# Patient Record
Sex: MALE | Race: WHITE | NOT HISPANIC OR LATINO | Employment: OTHER | URBAN - METROPOLITAN AREA
[De-identification: names, ages, dates, MRNs, and addresses within clinical notes are randomized per-mention and may not be internally consistent; named-entity substitution may affect disease eponyms.]

---

## 2017-01-03 ENCOUNTER — OFFICE VISIT (OUTPATIENT)
Dept: UROLOGY | Facility: CLINIC | Age: 67
End: 2017-01-03
Payer: MEDICARE

## 2017-01-03 VITALS — BODY MASS INDEX: 28.71 KG/M2 | HEIGHT: 72 IN | WEIGHT: 212 LBS

## 2017-01-03 DIAGNOSIS — N20.0 NEPHROLITHIASIS: Primary | ICD-10-CM

## 2017-01-03 LAB
BACTERIA #/AREA URNS AUTO: ABNORMAL /HPF
BILIRUB UR QL STRIP: NEGATIVE
CLARITY UR REFRACT.AUTO: CLEAR
COLOR UR AUTO: YELLOW
GLUCOSE UR QL STRIP: ABNORMAL
HGB UR QL STRIP: ABNORMAL
HYALINE CASTS UR QL AUTO: 3 /LPF
KETONES UR QL STRIP: NEGATIVE
LEUKOCYTE ESTERASE UR QL STRIP: NEGATIVE
MICROSCOPIC COMMENT: ABNORMAL
NITRITE UR QL STRIP: NEGATIVE
PH UR STRIP: 6 [PH] (ref 5–8)
PROT UR QL STRIP: ABNORMAL
RBC #/AREA URNS AUTO: 11 /HPF (ref 0–4)
SP GR UR STRIP: 1.02 (ref 1–1.03)
URN SPEC COLLECT METH UR: ABNORMAL
UROBILINOGEN UR STRIP-ACNC: NEGATIVE EU/DL
WBC #/AREA URNS AUTO: 3 /HPF (ref 0–5)
YEAST UR QL AUTO: ABNORMAL

## 2017-01-03 PROCEDURE — 99999 PR PBB SHADOW E&M-EST. PATIENT-LVL III: CPT | Mod: PBBFAC,,, | Performed by: UROLOGY

## 2017-01-03 PROCEDURE — 87086 URINE CULTURE/COLONY COUNT: CPT

## 2017-01-03 PROCEDURE — 81001 URINALYSIS AUTO W/SCOPE: CPT

## 2017-01-03 PROCEDURE — 1159F MED LIST DOCD IN RCRD: CPT | Mod: S$GLB,,, | Performed by: UROLOGY

## 2017-01-03 PROCEDURE — 81002 URINALYSIS NONAUTO W/O SCOPE: CPT | Mod: S$GLB,,, | Performed by: UROLOGY

## 2017-01-03 PROCEDURE — 99204 OFFICE O/P NEW MOD 45 MIN: CPT | Mod: 25,S$GLB,, | Performed by: UROLOGY

## 2017-01-04 LAB — BACTERIA UR CULT: NO GROWTH

## 2017-01-04 NOTE — PROGRESS NOTES
Subjective:      Patient ID: Sherif Knutson Jr. is a 66 y.o. male.    Chief Complaint: Nephrolithiasis    Patient is a 66 y.o. male who is new to our clinic and self-referred for evaluation of nephrolithiasis.     HPI  The patient comes with outside imaging confirming bilateral nephrolithiasis.  He reports intermittent left sided abdominal and flank pain. No fevers or chills.  He did have hematuria which prompted a work-up, and this is how he came to have the CT urogram.  He reports having had a cystoscopy which was by his report normal.  He has a long history of kidney stones.  No dysuria.  No fevers or chills.  No nausea/vomiting.       CT scan was independently reviewed today and reveals non-obstructing right renal stones without hydronephrosis.  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  No left ureteral stones.      Review of Systems   All other systems reviewed and negative except pertinent positives noted in HPI.    Objective:     Physical Exam   Constitutional: He is oriented to person, place, and time. He appears well-developed and well-nourished. No distress.   HENT:   Head: Normocephalic and atraumatic.   Eyes: No scleral icterus.   Neck: No tracheal deviation present.   Pulmonary/Chest: Effort normal. No respiratory distress.   Abdominal:       Neurological: He is alert and oriented to person, place, and time.   Psychiatric: He has a normal mood and affect. His behavior is normal. Judgment and thought content normal.     Assessment:     1. Nephrolithiasis      Plan:     1. Kidney stones:  -General risk factors for kidney stones and the conservative measures to prevent kidney stones in the future were discussed with the patient in detail.  The patient was encouraged to drink 2-3 liters of water a day, limit iced tea and junior as well as foods high in oxalate.  They were cautioned to try to limit salt and red meat intake.  We also  discussed adding citrate to the diet with the addition of dale or lemon juice to their water or alternatively with crystal light.   -CT scan was independently reviewed today and reveals as above.  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  -Extensive conversation had with the patient and films reviewed with him and his wife.  Discussed that PCNL and ESWL would either be not feasible or inadequate given his anatomy and stone burden.  We discussed the risks/benefits of both a robotic and a potentially staged endoscopic approach.  The patient would prefer ureteroscopy.  -I have explained the indication, risks, benefits, and alternatives of the procedure in detail.  Risks including but not limited to bleeding, infection, injury to the urethra, bladder, ureter, need for additional treatments, inability or incomplete removal of kidney stones, pain, and discomfort related to the stent were discussed in depth with the patient.  The patient voices understanding and all questions have been answered.  The patient agrees to proceed as planned with left ureteroscopy, laser lithotripsy, and ureteral stent placement.  -Urine dip: pH 5, -leuk, -nitrite, +gluc, +prot, +blood  -ua/urine culture  -labs/pat's  -plan for ureteroscopy at [facility] per patient preference.   -I spent over 45 minutes with the patient. Over 50% of the visit was spent in counseling.

## 2017-01-05 ENCOUNTER — HOSPITAL ENCOUNTER (OUTPATIENT)
Dept: RADIOLOGY | Facility: HOSPITAL | Age: 67
Discharge: HOME | End: 2017-01-05
Attending: UROLOGY
Payer: MEDICARE

## 2017-01-05 DIAGNOSIS — N20.0 NEPHROLITHIASIS: ICD-10-CM

## 2017-01-05 PROCEDURE — 93010 ELECTROCARDIOGRAM REPORT: CPT | Mod: S$GLB,,, | Performed by: INTERNAL MEDICINE

## 2017-01-05 PROCEDURE — 93005 ELECTROCARDIOGRAM TRACING: CPT | Mod: S$GLB,,, | Performed by: UROLOGY

## 2017-01-05 PROCEDURE — 71020 XR CHEST PA AND LATERAL: CPT | Mod: 26,,, | Performed by: RADIOLOGY

## 2017-01-09 ENCOUNTER — TELEPHONE (OUTPATIENT)
Dept: UROLOGY | Facility: CLINIC | Age: 67
End: 2017-01-09

## 2017-01-09 NOTE — TELEPHONE ENCOUNTER
----- Message from Jose F Mclaughlin MD sent at 1/9/2017  7:14 AM CST -----  Abnormal EKG, patient needs cardiac clearance prior to surgery.

## 2017-01-11 ENCOUNTER — TELEPHONE (OUTPATIENT)
Dept: UROLOGY | Facility: CLINIC | Age: 67
End: 2017-01-11

## 2017-01-11 DIAGNOSIS — N20.0 NEPHROLITHIASIS: Primary | ICD-10-CM

## 2017-01-12 NOTE — PRE ADMISSION SCREENING
Anesthesia Assessment: Preoperative EQUATION    Planned Procedure: Procedure(s) (LRB):  URETEROSCOPY (Left)  LITHOTRIPSY-LASER (N/A)  Requested Anesthesia Type:General  Surgeon: Jose F Mclaughlin MD  Service: Urology  Known or anticipated Date of Surgery:1/20/2017    Surgeon notes: reviewed    Electronic QUestionnaire Assessment completed via nurse interview with patient.        Sherif Knutson Jr. [[id number]] - 66 y.o. Male        Providers Outside of [facility]           Pre-admit from 1/20/2017 in [facility]     Has outside PCP  Yes       Surgical Risk Level      Surgical Risk Level:  1           caRDScore (Clinical Anesthesia Rapid Decision Score)        Moderate  Total Score: 20      20 Sum of Clinical Scores       caRDScores (Grouped)      caRDScore - Ane:  3                caRDScore - CVD:  6                caRDScore - Pul:  1                caRDScore - Met:  5                caRDScore - Phy:  5           caRDScore Items           Pre-admit from 1/20/2017 in [facility]     Anesthesia      Has had S/P back surgery for Disc/Fusion/Scoliosis  Yes [2011-Laminectomy]     Has sleep apnea confirmed by a sleep study / on CPAP  Yes     CVD      Activity similar to best ability for maximal activity or exercise  METS 5     Diagnosed with high blood pressure  Yes     Typical BP runs <150/90  Yes     Has coronary artery disease  Yes     Has at least one stent in a coronary artery  Yes     S/P angioplasty (balloon) in a coronary artery  Yes     Has had bypass surgery (CABG)  Yes     Pulmonary      Has sleep apnea confirmed by a sleep study / on CPAP  Yes     Metabolic      Has kidney problem, NOT followed by nephrologist  Yes [Kidney stones]     Type 2 Diabetes  Yes     Taking chronic insulin via home insulin pump  Yes [Novolog sub-q Lashay device ]     Has hyperlipoproteinemia  Yes     Physiologic      Obesity Status  Not Obese (BMI <30)     Aspirin (taken because I have stents)  Yes      Recent syncope, pre-syncope or weakness or other unusual spell  Yes       Flags      Red Flag Score:  2                Yellow Flag Score:  7           Red Flags           Pre-admit from 1/20/2017 in [facility]     Obesity Status  Not Obese (BMI <30)     Aspirin (taken because I have stents)  Yes     Taking chronic insulin via home insulin pump  Yes [Novolog sub-q Lashay device ]       Yellow Flags           Pre-admit from 1/20/2017 in [facility]     Takes herbal medications or vitamin supplements  Yes     Obesity Status  Not Obese (BMI <30)     Aspirin  Yes     Has sleep apnea confirmed by a sleep study / on CPAP  Yes     Has at least one stent in a coronary artery  Yes     Recent syncope, pre-syncope or weakness or other unusual spell  Yes     Has kidney problem, NOT followed by nephrologist  Yes [Kidney stones]     Has pain  Yes       PONV Risk Score (assumes periop narcotic use = +1, Max=4)      PONV Risk Score:  2           PONV Risk Factors  Total Score: 1      1 Non-Smoker at present       Sleep Apnea  Total Score: 1      1 Has sleep apnea confirmed by a sleep study / on CPAP       EMELI STOP-Bang Risk Factors (Max=8)  Total Score: 3      1 Takes medication for high blood pressure     1 Age >50     1 Male       EMELI Risk Level - 1 (Low), 2 (Moderate), 3 (High)      EMELI Risk Level:  2           RCRI (Revised Cardiac Risk Indices of ACC/AHA guidelines, Max=6)  Total Score: 2      1 Has/has had CAD     1 Taking chronic insulin       CAD Risk Factors  Total Score: 4      1 Male. Age >45     1 Diagnosed with high blood pressure     1 Has Diabetes     1 Has hyperlipoproteinemia       CHADS Score if applicable (history of atrial fib/flutter, Max=6)  Total Score: 2      1 Diagnosed with high blood pressure     1 Has Diabetes       Maximal Exercise Capacity           Pre-admit from 1/20/2017 in [facility]     Maximal Exercise Capacity  METS 5       Summary of  Dependence  Total Score: 1      1 Is totally independent of others for activities of daily living       Phone Fraility Score (Max = 17)  Total Score: 1      1 Uses 5 or more meds on reg basis       Pain Factors           Pre-admit from 1/20/2017 in [facility]     Has pain  Yes     Location and description of pain  -- [Kidney stone, lower back pain]     Duration of pain  Pain is chronic, has been present greater than 12 mo [Back pain]     Typical Pain Scores  7 to 10     Depends on strong Rx (opioids, adjunctive meds)  Yes     Uses one of the following medications:  -- [Percocet]       Risk Triggers (Evidence-Based Risk Triggers)        Pulmonary Risk Triggers  Total Score: 2      1 Age > or = 60     1 Has sleep apnea confirmed by a sleep study / on CPAP       Renal Risk Triggers  Total Score: 4      1 Diagnosed with high blood pressure     1 Has kidney problem, NOT followed by nephrologist     1 Type 2 Diabetes     1 Taking chronic insulin via home insulin pump       Delirium Risk Triggers  Total Score: 0        Urologic Risk Triggers  Total Score: 0        Logistics        Pre-op Clinic Logistics  Total Score: 18      1 Has outside PCP     2 Takes herbal medications or vitamin supplements     1 Has had anesthesia, either as adult or as a child     3 Aspirin (taken because I have stents)     1 Has had S/P back surgery for Disc/Fusion/Scoliosis     1 Takes medication for high blood pressure     1 Has coronary artery disease     1 Has at least one stent in a coronary artery     1 Currently taking medication for coronary disease     2 Recent syncope, pre-syncope or weakness or other unusual spell     1 Has chronic pain / depends on strong Rx (opioids, adjunctive meds)     3 Taking chronic insulin via home insulin pump       [facility] Logistics  Total Score: 4      1 Aspirin (taken because I have stents)     3 Taking chronic insulin via home insulin pump       Discharge Logistics  Total Score: 2      1  "Has had S/P back surgery for Disc/Fusion/Scoliosis     1 Has sleep apnea confirmed by a sleep study / on CPAP       Discharge Planning           Pre-admit from 1/20/2017 in [facility]     Discharge Planning      Discharge plans  Home with assistance     Will assist patient 24/7, if needed  -- [wife]       Fast Track <For office use only>      Total Score: 16        Surgical Risk Level Assessment                 Triage considerations:     The patient has no apparent active cardiac condition (No unstable coronary Syndrome such as severe unstable angina or recent [<1 month] myocardial infarction, decompensated CHF, severe valvular   disease or significant arrhythmia)    Previous anesthesia records:Not available    Subspecialty notes: Cardiology: General, Neurology    Other important co-morbidities: CAD, CABG, DM-2, EMELI, HTN, HLD, Laminectomy     Tests already available:  Available tests,  within 3 months , outside [facility] , within [facility] .  12/12/16 A1C-7.9,   1/2017 CMP, UA, EKG, X-Ray Chest   10/2016 CBC, TSH, Lipid Panel.  Outside records scanned into [facility]            Instructions given. (See in Nurse's note)    Optimization:       Sub-specialist consult indicated:  Cardiology Clearance Indicated per Dr ODALYS Mclaughlin          \Plan:    Testing:  None needed      Consultation:Cardiology Clearance   1/18/17  Preoperative cardiovascular examination  The patient's revised cardiovascular risk is 6.6% for significant CV events for lithotripsy but up to 11% if he requires an open procedure.  Instructed to take his beta blocker the evening prior to surgery. If converted to an open procedure consider applying 1 " nitrol ointment on call to the OR.  Coronary artery disease involving native coronary artery of native heart without angina pectoris    Patient  has previously scheduled Medical Appointment: None prior to surgery    Navigation: Tests Scheduled. none             Consults scheduled.  Cardiology " appointment 1/18/17             Results will be tracked by Preop Clinic.    Alyssa Alvarez RN

## 2017-01-13 ENCOUNTER — ANESTHESIA EVENT (OUTPATIENT)
Dept: SURGERY | Facility: HOSPITAL | Age: 67
End: 2017-01-13
Payer: MEDICARE

## 2017-01-13 NOTE — PRE-PROCEDURE INSTRUCTIONS
Pre-op interview completed via phone. Patient instructed about remaining NPO after midnight, medications to take AM of surgery,  place/floor to check in on the day of surgery. Instructed to bathe with antibacterial soap night before and morning of surgery, no makeup, lotions, hair products, deodorant, etc.

## 2017-01-13 NOTE — ANESTHESIA PREPROCEDURE EVALUATION
Anesthesia Assessment: Preoperative EQUATION     Planned Procedure: Procedure(s) (LRB):  URETEROSCOPY (Left)  LITHOTRIPSY-LASER (N/A)  Requested Anesthesia Type:General  Surgeon: Jose F Mclaughlin MD  Service: Urology  Known or anticipated Date of Surgery:1/20/2017     Surgeon notes: reviewed     Electronic QUestionnaire Assessment completed via nurse interview with patient.                    Sherif Knutson Jr. [[id number]] - 66 y.o. Male         Providers Outside of [facility]             Pre-admit from 1/20/2017 in [facility]      Has outside PCP   Yes        Surgical Risk Level       Surgical Risk Level:   1              caRDScore (Clinical Anesthesia Rapid Decision Score)         Moderate  Total Score: 20       20 Sum of Clinical Scores        caRDScores (Grouped)       caRDScore - Ane:   3                       caRDScore - CVD:   6                       caRDScore - Pul:   1                       caRDScore - Met:   5                       caRDScore - Phy:   5              caRDScore Items             Pre-admit from 1/20/2017 in [facility]      Anesthesia        Has had S/P back surgery for Disc/Fusion/Scoliosis   Yes [2011-Laminectomy]      Has sleep apnea confirmed by a sleep study / on CPAP   Yes      CVD        Activity similar to best ability for maximal activity or exercise   METS 5      Diagnosed with high blood pressure   Yes      Typical BP runs <150/90   Yes      Has coronary artery disease   Yes      Has at least one stent in a coronary artery   Yes      S/P angioplasty (balloon) in a coronary artery   Yes      Has had bypass surgery (CABG)   Yes      Pulmonary        Has sleep apnea confirmed by a sleep study / on CPAP   Yes      Metabolic        Has kidney problem, NOT followed by nephrologist   Yes [Kidney stones]      Type 2 Diabetes   Yes      Taking chronic insulin via home insulin pump   Yes [Novolog sub-q Lashay device ]      Has hyperlipoproteinemia   Yes       Physiologic        Obesity Status   Not Obese (BMI <30)      Aspirin (taken because I have stents)   Yes      Recent syncope, pre-syncope or weakness or other unusual spell   Yes        Flags       Red Flag Score:   2                       Yellow Flag Score:   7              Red Flags             Pre-admit from 1/20/2017 in [facility]      Obesity Status   Not Obese (BMI <30)      Aspirin (taken because I have stents)   Yes      Taking chronic insulin via home insulin pump   Yes [Novolog sub-q Lashay device ]        Yellow Flags             Pre-admit from 1/20/2017 in [facility]      Takes herbal medications or vitamin supplements   Yes      Obesity Status   Not Obese (BMI <30)      Aspirin   Yes      Has sleep apnea confirmed by a sleep study / on CPAP   Yes      Has at least one stent in a coronary artery   Yes      Recent syncope, pre-syncope or weakness or other unusual spell   Yes      Has kidney problem, NOT followed by nephrologist   Yes [Kidney stones]      Has pain   Yes        PONV Risk Score (assumes periop narcotic use = +1, Max=4)       PONV Risk Score:   2              PONV Risk Factors  Total Score: 1       1 Non-Smoker at present        Sleep Apnea  Total Score: 1       1 Has sleep apnea confirmed by a sleep study / on CPAP        EMELI STOP-Bang Risk Factors (Max=8)  Total Score: 3       1 Takes medication for high blood pressure      1 Age >50      1 Male        EMELI Risk Level - 1 (Low), 2 (Moderate), 3 (High)       EMELI Risk Level:   2              RCRI (Revised Cardiac Risk Indices of ACC/AHA guidelines, Max=6)  Total Score: 2       1 Has/has had CAD      1 Taking chronic insulin        CAD Risk Factors  Total Score: 4       1 Male. Age >45      1 Diagnosed with high blood pressure      1 Has Diabetes      1 Has hyperlipoproteinemia        CHADS Score if applicable (history of atrial fib/flutter, Max=6)  Total Score: 2       1 Diagnosed with high blood pressure       1 Has Diabetes        Maximal Exercise Capacity             Pre-admit from 1/20/2017 in [facility]      Maximal Exercise Capacity   METS 5        Summary of Dependence  Total Score: 1       1 Is totally independent of others for activities of daily living        Phone Fraility Score (Max = 17)  Total Score: 1       1 Uses 5 or more meds on reg basis        Pain Factors             Pre-admit from 1/20/2017 in [facility]      Has pain   Yes      Location and description of pain   -- [Kidney stone, lower back pain]      Duration of pain   Pain is chronic, has been present greater than 12 mo [Back pain]      Typical Pain Scores   7 to 10      Depends on strong Rx (opioids, adjunctive meds)   Yes      Uses one of the following medications:   -- [Percocet]        Risk Triggers (Evidence-Based Risk Triggers)         Pulmonary Risk Triggers  Total Score: 2       1 Age > or = 60      1 Has sleep apnea confirmed by a sleep study / on CPAP        Renal Risk Triggers  Total Score: 4       1 Diagnosed with high blood pressure      1 Has kidney problem, NOT followed by nephrologist      1 Type 2 Diabetes      1 Taking chronic insulin via home insulin pump        Delirium Risk Triggers  Total Score: 0         Urologic Risk Triggers  Total Score: 0         Logistics         Pre-op Clinic Logistics  Total Score: 18       1 Has outside PCP      2 Takes herbal medications or vitamin supplements      1 Has had anesthesia, either as adult or as a child      3 Aspirin (taken because I have stents)      1 Has had S/P back surgery for Disc/Fusion/Scoliosis      1 Takes medication for high blood pressure      1 Has coronary artery disease      1 Has at least one stent in a coronary artery      1 Currently taking medication for coronary disease      2 Recent syncope, pre-syncope or weakness or other unusual spell      1 Has chronic pain / depends on strong Rx (opioids, adjunctive meds)      3  "Taking chronic insulin via home insulin pump        [facility] Logistics  Total Score: 4       1 Aspirin (taken because I have stents)      3 Taking chronic insulin via home insulin pump        Discharge Logistics  Total Score: 2       1 Has had S/P back surgery for Disc/Fusion/Scoliosis      1 Has sleep apnea confirmed by a sleep study / on CPAP        Discharge Planning             Pre-admit from 1/20/2017 in [facility]      Discharge Planning        Discharge plans   Home with assistance      Will assist patient 24/7, if needed   -- [wife]        Fast Track <For office use only>       Total Score: 16          Surgical Risk Level Assessment                       Triage considerations:      The patient has no apparent active cardiac condition (No unstable coronary Syndrome such as severe unstable angina or recent [<1 month] myocardial infarction, decompensated CHF, severe valvular disease or significant arrhythmia)     Previous anesthesia records:Not available     Subspecialty notes: Cardiology: General, Neurology     Other important co-morbidities: CAD, CABG, DM-2, EMELI, HTN, HLD, Laminectomy      Tests already available:  Available tests, within 3 months , [facility] , [facility] . 12/12/16 A1C-7.9, 1/2017 CMP, UA, EKG, X-Ray Chest 10/2016 CBC, TSH, Lipid Panel. Outside records scanned into [facility]      Instructions given. (See in Nurse's note)     Optimization:   Sub-specialist consult indicated: Cardiology Clearance Indicated per Dr ODALYS Mclaughlin       Plan:   Testing: None needed  Consultation:Cardiology Clearance   1/18/17  Preoperative cardiovascular examination  The patient's revised cardiovascular risk is 6.6% for significant CV events for lithotripsy but up to 11% if he requires an open procedure.  Instructed to take his beta blocker the evening prior to surgery. If converted to an open procedure consider applying 1 " nitrol ointment on call to the OR.  Coronary artery disease involving " native coronary artery of native heart without angina pectoris     Patient has previously scheduled Medical Appointment: None prior to surgery     Navigation: Tests Scheduled. none  Consults scheduled.  Cardiology appointment 1/18/17  Results will be tracked by Preop Clinic.     Alyssa Alvarez RN                                                                                                                 01/13/2017  Sherif Knutson Jr. is a 66 y.o., male.    [facility] Anesthesia Evaluation    I have reviewed the Patient Summary Reports.    I have reviewed the Nursing Notes.      Review of Systems  Anesthesia Hx:  No problems with previous Anesthesia    Social:  Non-Smoker    Hematology/Oncology:  Hematology Normal   Oncology Normal     Cardiovascular:   CAD asymptomatic  CABG WITHOUT PROBS  NOW ADEQUATE EXERCISE TOLERANCE  cardiology VISIT 3D AGO NO PROBS ONGOING   Pulmonary:  Pulmonary Normal  Denies Shortness of breath.  Denies Sleep Apnea.    Renal/:  Renal/ Normal     Hepatic/GI:  Hepatic/GI Normal    Musculoskeletal:   CERVICAL AND LUMBOSACRAL DISCOPATHY WITH PERIPHERAL N INVOLVEMENT. NON SURGICAL.    OB/GYN/PEDS:  Legal Guardian is Mother , birth was Full Term Denies Developmental Delay Denies Anomilies    Neurological:  Neurology Normal    Endocrine:   Diabetes, type 2, using insulin    Dermatological:  Skin Normal        Physical Exam  General:  Well nourished, Obesity    Airway/Jaw/Neck:  Airway Findings: Mouth Opening: Normal Tongue: Normal  General Airway Assessment: Adult  Mallampati: II  Improves to II with phonation.  TM Distance: Normal, at least 6 cm      Dental:  Dental Findings: upper partial dentures, lower partial dentures   Chest/Lungs:  Chest/Lungs Findings: Clear to auscultation     Heart/Vascular:  Heart Findings: Rate: Normal  Rhythm: Regular Rhythm  Sounds: Normal        Mental Status:  Mental Status Findings:  Cooperative, Alert and Oriented         Anesthesia Plan  Type of Anesthesia,  risks & benefits discussed:  Anesthesia Type:  general  Patient's Preference:   Intra-op Monitoring Plan:   Intra-op Monitoring Plan Comments:   Post Op Pain Control Plan:   Post Op Pain Control Plan Comments:   Induction:    Beta Blocker:  Patient is on a Beta-Blocker and has received one dose within the past 24 hours (No further documentation required).       Informed Consent: Patient understands risks and agrees with Anesthesia plan.  Questions answered. Anesthesia consent signed with patient.  ASA Score: 3     Day of Surgery Review of History & Physical: I have interviewed and examined the patient. I have reviewed the patient's H&P dated:            Ready For Surgery From Anesthesia Perspective.

## 2017-01-18 ENCOUNTER — OFFICE VISIT (OUTPATIENT)
Dept: CARDIOLOGY | Facility: CLINIC | Age: 67
End: 2017-01-18
Payer: MEDICARE

## 2017-01-18 VITALS
WEIGHT: 216.06 LBS | DIASTOLIC BLOOD PRESSURE: 75 MMHG | HEART RATE: 78 BPM | SYSTOLIC BLOOD PRESSURE: 126 MMHG | HEIGHT: 72 IN | BODY MASS INDEX: 29.26 KG/M2

## 2017-01-18 DIAGNOSIS — E78.5 DYSLIPIDEMIA: ICD-10-CM

## 2017-01-18 DIAGNOSIS — Z98.61 POST PTCA: Chronic | ICD-10-CM

## 2017-01-18 DIAGNOSIS — I95.1 ORTHOSTATIC HYPOTENSION: ICD-10-CM

## 2017-01-18 DIAGNOSIS — I25.10 CORONARY ARTERY DISEASE INVOLVING NATIVE CORONARY ARTERY OF NATIVE HEART WITHOUT ANGINA PECTORIS: ICD-10-CM

## 2017-01-18 DIAGNOSIS — Z01.810 PREOPERATIVE CARDIOVASCULAR EXAMINATION: Primary | ICD-10-CM

## 2017-01-18 DIAGNOSIS — Z95.1 HX OF CABG: ICD-10-CM

## 2017-01-18 DIAGNOSIS — I25.810 CORONARY ATHEROSCLEROSIS OF AUTOLOGOUS VEIN BYPASS GRAFT WITHOUT ANGINA: Chronic | ICD-10-CM

## 2017-01-18 PROCEDURE — 99214 OFFICE O/P EST MOD 30 MIN: CPT | Mod: S$GLB,,, | Performed by: INTERNAL MEDICINE

## 2017-01-18 PROCEDURE — 3078F DIAST BP <80 MM HG: CPT | Mod: S$GLB,,, | Performed by: INTERNAL MEDICINE

## 2017-01-18 PROCEDURE — 99999 PR PBB SHADOW E&M-EST. PATIENT-LVL III: CPT | Mod: PBBFAC,,, | Performed by: INTERNAL MEDICINE

## 2017-01-18 PROCEDURE — 3074F SYST BP LT 130 MM HG: CPT | Mod: S$GLB,,, | Performed by: INTERNAL MEDICINE

## 2017-01-18 PROCEDURE — 1159F MED LIST DOCD IN RCRD: CPT | Mod: S$GLB,,, | Performed by: INTERNAL MEDICINE

## 2017-01-18 NOTE — PROGRESS NOTES
"Subjective:   Patient ID:  Sherif Knutson Jr. is a 66 y.o. male who presents for  Pre-op Exam      HPI:   Sherif Knutson Jr. presents for pre op evaluation and risk stratification for planned lithotripsy of a symptomatic kidney stone. Sherif Knutson Jr. has known coronary artery disease having had CABG and PCI with all grafts occluded. He has had no chest pain. Major problem has been orthostatic hypotension that has been helped considerably by low dose midamor. Sherif Knutson Jr. denies chest pain,  palpitations, presyncope , or syncope. Sherif Knutson Jr. has dyslipidemia  on high intensity statin with low HDL.  Review of Systems   Constitution: Negative for weakness, malaise/fatigue, weight gain and weight loss.   HENT: Negative for headaches.    Eyes: Negative for blurred vision.   Cardiovascular: Negative for chest pain, claudication, cyanosis, dyspnea on exertion, irregular heartbeat, leg swelling, near-syncope, orthopnea, palpitations, paroxysmal nocturnal dyspnea and syncope.   Respiratory: Negative for cough, shortness of breath and wheezing.         EMELI intolerant of CPAP   Endocrine:        DM not well controlled   Musculoskeletal: Negative for falls and myalgias.   Gastrointestinal: Negative for abdominal pain, heartburn, nausea and vomiting.   Genitourinary: Negative for nocturia.   Neurological: Negative for brief paralysis, dizziness, focal weakness, numbness and paresthesias.   Psychiatric/Behavioral: Negative for altered mental status.       Current Outpatient Prescriptions   Medication Sig    aspirin (ECOTRIN) 81 MG EC tablet Take 81 mg by mouth once daily.    BD ULTRA-FINE JERMAINE PEN NEEDLES 32 gauge x 5/32" Ndle     CYANOCOBALAMIN, VITAMIN B-12, (VITAMIN B-12 ORAL) Take by mouth.    EASY TOUCH 31 X 5/16 " Ndle     lidocaine (XYLOCAINE) 5 % Oint ointment     liraglutide (VICTOZA 2-ANDRE) 0.6 mg/0.1 mL (18 mg/3 mL) PnIj Inject into the skin once daily.    LYRICA 100 mg capsule 2 (two) times daily.     " "metoprolol succinate (TOPROL-XL) 25 MG 24 hr tablet Take 1 tablet (25 mg total) by mouth once daily.    midodrine (PROAMATINE) 2.5 MG Tab Take 1 tablet (2.5 mg total) by mouth 3 (three) times daily.    NOVOLOG FLEXPEN 100 unit/mL InPn pen as needed.     oxycodone-acetaminophen (PERCOCET)  mg per tablet Take 1 tablet by mouth every 6 to 8 hours as needed for Pain.    rosuvastatin (CRESTOR) 40 MG Tab Take 1 tablet (40 mg total) by mouth every evening.    sub-q insulin device, 20 unit Lashay by Misc.(Non-Drug; Combo Route) route.    SURE COMFORT INSULIN SYRINGE 1/2 mL 31 x 5/16" Syrg      No current facility-administered medications for this visit.      Objective:   Physical Exam   Constitutional: He is oriented to person, place, and time. He appears well-developed. No distress.   /75 (BP Location: Left arm, Patient Position: Sitting, BP Method: Automatic)  Pulse 78  Ht 6' (1.829 m)  Wt 98 kg (216 lb 0.8 oz)  BMI 29.3 kg/m2   HENT:   Head: Normocephalic.   Right Ear: External ear normal.   Left Ear: External ear normal.   Eyes: EOM are normal. Pupils are equal, round, and reactive to light. No scleral icterus.   Neck: Neck supple. No JVD present. No thyromegaly present.   Cardiovascular: Normal rate, regular rhythm, normal heart sounds and intact distal pulses.  PMI is not displaced.  Exam reveals no gallop and no friction rub.    No murmur heard.  Pulmonary/Chest: Effort normal and breath sounds normal. No respiratory distress. He has no wheezes. He has no rales.   Median sternotomy scar.   Abdominal: Soft. He exhibits no distension. There is no hepatosplenomegaly. There is no tenderness.   Insulin dispenser LLQ.   Musculoskeletal: He exhibits no edema or tenderness.   Gait normal   Neurological: He is alert and oriented to person, place, and time.   Skin: Skin is warm and dry. No rash noted.   Psychiatric: He has a normal mood and affect. His behavior is normal.       Lab Results   Component Value " "Date     01/05/2017    K 5.1 01/05/2017     01/05/2017    CO2 29 01/05/2017    BUN 19 01/05/2017    CREATININE 1.5 (H) 01/05/2017     (H) 01/05/2017    HGBA1C 7.7 (H) 06/29/2014    MG 2.2 10/06/2014    AST 34 01/05/2017    ALT 48 (H) 01/05/2017    ALBUMIN 4.0 01/05/2017    PROT 7.8 01/05/2017    BILITOT 1.5 (H) 01/05/2017    WBC 7.27 10/20/2016    HGB 15.2 10/20/2016    HCT 45.5 10/20/2016    MCV 89 10/20/2016     10/20/2016    TSH 1.354 10/20/2016    CHOL 115 (L) 10/20/2016    HDL 46 10/20/2016    LDLCALC 41.6 (L) 10/20/2016    TRIG 137 10/20/2016       Assessment:     1. Preoperative cardiovascular examination    2. Coronary artery disease involving native coronary artery of native heart without angina pectoris: Stable    3. Hx of CABG    4. Post PTCA    5. Coronary atherosclerosis of autologous vein bypass graft without angina    6. Orthostatic hypotension : Improved on midamor   7. Dyslipidemia :  on high intensity statin At goal       Plan:     Sherif was seen today for pre-op exam.    Diagnoses and all orders for this visit:    Preoperative cardiovascular examination  The patient's revised cardiovascular risk is 6.6% for significant CV events for lithotripsy but up to 11% if he requires an open procedure.  Instructed to take his beta blocker the evening prior to surgery. If converted to an open procedure consider applying 1 " nitrol ointment on call to the OR.  Coronary artery disease involving native coronary artery of native heart without angina pectoris    Hx of CABG    Post PTCA    Coronary atherosclerosis of autologous vein bypass graft without angina    Orthostatic hypotension  Continue current regimen    Dyslipidemia  Continue current regimen      "

## 2017-01-18 NOTE — MR AVS SNAPSHOT
"    [facility]  [address]  [place] [address]  Phone: [phone]                  Sherif Knutson Jr.   2017 1:30 PM   Office Visit    Description:  Male : 1950   Provider:  Kodi Allen MD   Department:  [facility] - Cardiology           Reason for Visit     Pre-op Exam                To Do List           Your Future Surgeries/Procedures     2017   Surgery with Jose F Mclaughlin MD   [facility]    [address] [facility] [address] [phone]              Goals (5 Years of Data)     None      [facility]     [facility] Nurse Care Line -  Assistance  Registered nurses in the [facility] provide clinical advisement, health education, appointment booking, and other advisory services.  Call for this free service at [phone].             Medications           STOP taking these medications     NOVOLOG 100 unit/mL injection as needed. Sliding Scaling           Verify that the below list of medications is an accurate representation of the medications you are currently taking.  If none reported, the list may be blank. If incorrect, please contact your healthcare provider. Carry this list with you in case of emergency.           Current Medications     aspirin (ECOTRIN) 81 MG EC tablet Take 81 mg by mouth once daily.    BD ULTRA-FINE JERMAINE PEN NEEDLES 32 gauge x 5/32" Ndle     CYANOCOBALAMIN, VITAMIN B-12, (VITAMIN B-12 ORAL) Take by mouth.    EASY TOUCH 31 X 5/16 " Ndle     lidocaine (XYLOCAINE) 5 % Oint ointment     liraglutide (VICTOZA 2-ANDRE) 0.6 mg/0.1 mL (18 mg/3 mL) PnIj Inject into the skin once daily.    LYRICA 100 mg capsule 2 (two) times daily.     metoprolol succinate (TOPROL-XL) 25 MG 24 hr tablet Take 1 tablet (25 mg total) by mouth once daily.    midodrine (PROAMATINE) 2.5 MG Tab Take 1 tablet (2.5 mg total) by mouth 3 (three) times daily.    NOVOLOG FLEXPEN 100 unit/mL InPn " "pen as needed.     oxycodone-acetaminophen (PERCOCET)  mg per tablet Take 1 tablet by mouth every 6 to 8 hours as needed for Pain.    rosuvastatin (CRESTOR) 40 MG Tab Take 1 tablet (40 mg total) by mouth every evening.    sub-q insulin device, 20 unit Lashay by Misc.(Non-Drug; Combo Route) route.    SURE COMFORT INSULIN SYRINGE 1/2 mL 31 x 5/16" Syrg            Clinical Reference Information           Vital Signs - Last Recorded  Most recent update: 1/18/2017  1:33 PM by Sinai Hanna MA    BP Pulse Ht Wt BMI    126/75 (BP Location: Left arm, Patient Position: Sitting, BP Method: Automatic) 78 6' (1.829 m) 98 kg (216 lb 0.8 oz) 29.3 kg/m2      Blood Pressure          Most Recent Value    Right Arm BP - Sitting  126/75    Left Arm BP - Sitting  126/75    BP  126/75      Allergies as of 1/18/2017     Sulfa (Sulfonamide Antibiotics)    Zocor [Simvastatin]      Immunizations Administered on Date of Encounter - 1/18/2017     None      "

## 2017-01-19 ENCOUNTER — TELEPHONE (OUTPATIENT)
Dept: UROLOGY | Facility: CLINIC | Age: 67
End: 2017-01-19

## 2017-01-20 ENCOUNTER — HOSPITAL ENCOUNTER (OUTPATIENT)
Facility: HOSPITAL | Age: 67
Discharge: HOME | End: 2017-01-20
Attending: UROLOGY | Admitting: UROLOGY
Payer: MEDICARE

## 2017-01-20 ENCOUNTER — TELEPHONE (OUTPATIENT)
Dept: UROLOGY | Facility: CLINIC | Age: 67
End: 2017-01-20

## 2017-01-20 ENCOUNTER — SURGERY (OUTPATIENT)
Age: 67
End: 2017-01-20

## 2017-01-20 ENCOUNTER — ANESTHESIA (OUTPATIENT)
Dept: SURGERY | Facility: HOSPITAL | Age: 67
End: 2017-01-20
Payer: MEDICARE

## 2017-01-20 VITALS
TEMPERATURE: 98 F | WEIGHT: 212 LBS | SYSTOLIC BLOOD PRESSURE: 152 MMHG | HEIGHT: 72 IN | DIASTOLIC BLOOD PRESSURE: 81 MMHG | HEART RATE: 64 BPM | OXYGEN SATURATION: 98 % | RESPIRATION RATE: 18 BRPM | BODY MASS INDEX: 28.71 KG/M2

## 2017-01-20 DIAGNOSIS — N20.0 NEPHROLITHIASIS: ICD-10-CM

## 2017-01-20 LAB
POCT GLUCOSE: 189 MG/DL (ref 70–110)
POCT GLUCOSE: 205 MG/DL (ref 70–110)

## 2017-01-20 PROCEDURE — 52356 CYSTO/URETERO W/LITHOTRIPSY: CPT | Mod: LT,,, | Performed by: UROLOGY

## 2017-01-20 PROCEDURE — 25000003 PHARM REV CODE 250: Performed by: NURSE ANESTHETIST, CERTIFIED REGISTERED

## 2017-01-20 PROCEDURE — C1769 GUIDE WIRE: HCPCS | Performed by: UROLOGY

## 2017-01-20 PROCEDURE — 27200932: Performed by: UROLOGY

## 2017-01-20 PROCEDURE — 25000003 PHARM REV CODE 250

## 2017-01-20 PROCEDURE — 27200971 HC CYSTO SUPPLY II (SCOPE PRCDR.): Performed by: UROLOGY

## 2017-01-20 PROCEDURE — 82370 X-RAY ASSAY CALCULUS: CPT

## 2017-01-20 PROCEDURE — C2617 STENT, NON-COR, TEM W/O DEL: HCPCS | Performed by: UROLOGY

## 2017-01-20 PROCEDURE — D9220A PRA ANESTHESIA: Mod: CRNA,,, | Performed by: NURSE ANESTHETIST, CERTIFIED REGISTERED

## 2017-01-20 PROCEDURE — 63600175 PHARM REV CODE 636 W HCPCS: Performed by: NURSE ANESTHETIST, CERTIFIED REGISTERED

## 2017-01-20 PROCEDURE — C1894 INTRO/SHEATH, NON-LASER: HCPCS | Performed by: UROLOGY

## 2017-01-20 PROCEDURE — 37000009 HC ANESTHESIA EA ADD 15 MINS: Performed by: UROLOGY

## 2017-01-20 PROCEDURE — 37000008 HC ANESTHESIA 1ST 15 MINUTES: Performed by: UROLOGY

## 2017-01-20 PROCEDURE — 71000033 HC RECOVERY, INTIAL HOUR: Performed by: UROLOGY

## 2017-01-20 PROCEDURE — 74420 UROGRAPHY RTRGR +-KUB: CPT | Mod: 26,,, | Performed by: UROLOGY

## 2017-01-20 PROCEDURE — 27200917: Performed by: UROLOGY

## 2017-01-20 PROCEDURE — 76000 FLUOROSCOPY <1 HR PHYS/QHP: CPT | Mod: 26,59,, | Performed by: UROLOGY

## 2017-01-20 PROCEDURE — 36000707: Performed by: UROLOGY

## 2017-01-20 PROCEDURE — 71000015 HC POSTOP RECOV 1ST HR: Performed by: UROLOGY

## 2017-01-20 PROCEDURE — 36000706: Performed by: UROLOGY

## 2017-01-20 PROCEDURE — 27201423 OPTIME MED/SURG SUP & DEVICES STERILE SUPPLY: Performed by: UROLOGY

## 2017-01-20 PROCEDURE — 25500020 PHARM REV CODE 255: Performed by: UROLOGY

## 2017-01-20 PROCEDURE — 27000212: Performed by: UROLOGY

## 2017-01-20 PROCEDURE — 25000003 PHARM REV CODE 250: Performed by: UROLOGY

## 2017-01-20 PROCEDURE — 27201008 HC FLEXSCOPE: Performed by: UROLOGY

## 2017-01-20 PROCEDURE — 27200921 HC BAG, CYSTO DRAINAGE: Performed by: UROLOGY

## 2017-01-20 PROCEDURE — D9220A PRA ANESTHESIA: Mod: ANES,,, | Performed by: ANESTHESIOLOGY

## 2017-01-20 RX ORDER — OXYCODONE AND ACETAMINOPHEN 10; 325 MG/1; MG/1
1 TABLET ORAL EVERY 6 HOURS PRN
Qty: 40 TABLET | Refills: 0 | Status: SHIPPED | OUTPATIENT
Start: 2017-01-20

## 2017-01-20 RX ORDER — HYDROCODONE BITARTRATE AND ACETAMINOPHEN 5; 325 MG/1; MG/1
TABLET ORAL
Status: COMPLETED
Start: 2017-01-20 | End: 2017-01-20

## 2017-01-20 RX ORDER — MIDAZOLAM HYDROCHLORIDE 1 MG/ML
INJECTION, SOLUTION INTRAMUSCULAR; INTRAVENOUS
Status: DISCONTINUED | OUTPATIENT
Start: 2017-01-20 | End: 2017-01-20

## 2017-01-20 RX ORDER — GLYCOPYRROLATE 0.2 MG/ML
INJECTION INTRAMUSCULAR; INTRAVENOUS
Status: DISCONTINUED | OUTPATIENT
Start: 2017-01-20 | End: 2017-01-20

## 2017-01-20 RX ORDER — HYDROCODONE BITARTRATE AND ACETAMINOPHEN 5; 325 MG/1; MG/1
1 TABLET ORAL EVERY 4 HOURS PRN
Status: DISCONTINUED | OUTPATIENT
Start: 2017-01-20 | End: 2017-01-20 | Stop reason: HOSPADM

## 2017-01-20 RX ORDER — LIDOCAINE HCL/PF 100 MG/5ML
SYRINGE (ML) INTRAVENOUS
Status: DISCONTINUED | OUTPATIENT
Start: 2017-01-20 | End: 2017-01-20

## 2017-01-20 RX ORDER — TAMSULOSIN HYDROCHLORIDE 0.4 MG/1
0.4 CAPSULE ORAL NIGHTLY
Qty: 30 CAPSULE | Refills: 0 | Status: SHIPPED | OUTPATIENT
Start: 2017-01-20 | End: 2017-03-09

## 2017-01-20 RX ORDER — ROCURONIUM BROMIDE 10 MG/ML
INJECTION, SOLUTION INTRAVENOUS
Status: DISCONTINUED | OUTPATIENT
Start: 2017-01-20 | End: 2017-01-20

## 2017-01-20 RX ORDER — SODIUM CHLORIDE 9 MG/ML
INJECTION, SOLUTION INTRAVENOUS CONTINUOUS
Status: DISCONTINUED | OUTPATIENT
Start: 2017-01-20 | End: 2017-01-20 | Stop reason: HOSPADM

## 2017-01-20 RX ORDER — OXYBUTYNIN CHLORIDE 5 MG/1
5 TABLET ORAL 3 TIMES DAILY
Qty: 30 TABLET | Refills: 0 | Status: ON HOLD | OUTPATIENT
Start: 2017-01-20 | End: 2017-07-24 | Stop reason: HOSPADM

## 2017-01-20 RX ORDER — PROPOFOL 10 MG/ML
VIAL (ML) INTRAVENOUS
Status: DISCONTINUED | OUTPATIENT
Start: 2017-01-20 | End: 2017-01-20

## 2017-01-20 RX ORDER — ONDANSETRON 2 MG/ML
4 INJECTION INTRAMUSCULAR; INTRAVENOUS EVERY 12 HOURS PRN
Status: DISCONTINUED | OUTPATIENT
Start: 2017-01-20 | End: 2017-01-20 | Stop reason: HOSPADM

## 2017-01-20 RX ORDER — NEOSTIGMINE METHYLSULFATE 1 MG/ML
INJECTION, SOLUTION INTRAVENOUS
Status: DISCONTINUED | OUTPATIENT
Start: 2017-01-20 | End: 2017-01-20

## 2017-01-20 RX ORDER — PHENYLEPHRINE HYDROCHLORIDE 10 MG/ML
INJECTION INTRAVENOUS
Status: DISCONTINUED | OUTPATIENT
Start: 2017-01-20 | End: 2017-01-20

## 2017-01-20 RX ORDER — ONDANSETRON 2 MG/ML
INJECTION INTRAMUSCULAR; INTRAVENOUS
Status: DISCONTINUED | OUTPATIENT
Start: 2017-01-20 | End: 2017-01-20

## 2017-01-20 RX ORDER — FENTANYL CITRATE 50 UG/ML
INJECTION, SOLUTION INTRAMUSCULAR; INTRAVENOUS
Status: DISCONTINUED | OUTPATIENT
Start: 2017-01-20 | End: 2017-01-20

## 2017-01-20 RX ADMIN — SODIUM CHLORIDE: 0.9 INJECTION, SOLUTION INTRAVENOUS at 12:01

## 2017-01-20 RX ADMIN — Medication 2 G: at 10:01

## 2017-01-20 RX ADMIN — HYDROCODONE BITARTRATE AND ACETAMINOPHEN 1 TABLET: 5; 325 TABLET ORAL at 12:01

## 2017-01-20 RX ADMIN — MIDAZOLAM HYDROCHLORIDE 2 MG: 1 INJECTION, SOLUTION INTRAMUSCULAR; INTRAVENOUS at 10:01

## 2017-01-20 RX ADMIN — ONDANSETRON 4 MG: 2 INJECTION INTRAMUSCULAR; INTRAVENOUS at 12:01

## 2017-01-20 RX ADMIN — PHENYLEPHRINE HYDROCHLORIDE 100 MCG: 10 INJECTION INTRAVENOUS at 11:01

## 2017-01-20 RX ADMIN — PROPOFOL 200 MG: 10 INJECTION, EMULSION INTRAVENOUS at 10:01

## 2017-01-20 RX ADMIN — LIDOCAINE HYDROCHLORIDE 100 MG: 20 INJECTION, SOLUTION INTRAVENOUS at 10:01

## 2017-01-20 RX ADMIN — NEOSTIGMINE METHYLSULFATE 3 MG: 1 INJECTION INTRAVENOUS at 12:01

## 2017-01-20 RX ADMIN — ROCURONIUM BROMIDE 30 MG: 10 INJECTION, SOLUTION INTRAVENOUS at 10:01

## 2017-01-20 RX ADMIN — GLYCOPYRROLATE 0.4 MG: 0.2 INJECTION, SOLUTION INTRAMUSCULAR; INTRAVENOUS at 12:01

## 2017-01-20 RX ADMIN — IOHEXOL 50 ML: 300 INJECTION, SOLUTION INTRAVENOUS at 10:01

## 2017-01-20 RX ADMIN — SODIUM CHLORIDE: 0.9 INJECTION, SOLUTION INTRAVENOUS at 11:01

## 2017-01-20 RX ADMIN — SODIUM CHLORIDE: 0.9 INJECTION, SOLUTION INTRAVENOUS at 08:01

## 2017-01-20 RX ADMIN — FENTANYL CITRATE 100 MCG: 50 INJECTION, SOLUTION INTRAMUSCULAR; INTRAVENOUS at 10:01

## 2017-01-20 NOTE — TRANSFER OF CARE
Anesthesia Transfer of Care Note    Patient: hSerif Knutson Jr.    Procedure(s) Performed: Procedure(s) (LRB):  URETEROSCOPY (Left)  LITHOTRIPSY-LASER (Left)  CYSTOSCOPY (N/A)  EXTRACTION - STONE (Left)  PLACEMENT-STENT URETERAL (Left)  PYELOGRAM-RETROGRADE (Left)    Patient location: PACU    Anesthesia Type: general    Transport from OR: Transported from OR on 6-10 L/min O2 by face mask with adequate spontaneous ventilation    Post pain: adequate analgesia    Post assessment: no apparent anesthetic complications    Post vital signs: stable    Level of consciousness: awake and sedated    Nausea/Vomiting: no nausea/vomiting    Complications: none          Last vitals:   Visit Vitals    Ht 6' (1.829 m)    Wt 96.2 kg (212 lb)    BMI 28.75 kg/m2

## 2017-01-20 NOTE — DISCHARGE SUMMARY
"[facility]  Discharge Note  Short Stay    Admit Date: 1/20/2017    Discharge Date and Time: No discharge date for patient encounter.     Attending Physician: Jose F Mclaughlin MD     Discharge Provider: Jose F Mclaughlin    Diagnoses:  Active Hospital Problems    Diagnosis  POA    *Nephrolithiasis [N20.0]  Yes    DM (diabetes mellitus) [E11.9]  Yes    Coronary artery disease [I25.10]  Yes    Hx of CABG [Z95.1]  Not Applicable      Resolved Hospital Problems    Diagnosis Date Resolved POA   No resolved problems to display.       Discharged Condition: good    Hospital Course: Patient was admitted for an outpatient procedure and tolerated the procedure well with no complications.    Final Diagnoses: Same as principal problem.    Disposition: Home or Self Care    Follow up/Patient Instructions:    Activity and diet as tolerated.  Patient can expect gross hematuria.  F/u next week for repeat surgery.  My office will call to arrange.    Ok to shower.  Percocet, flomax, and ditropan sent electronically to the pharmacy.    Medications:  Reconciled Home Medications:   Current Discharge Medication List      START taking these medications    Details   oxybutynin (DITROPAN) 5 MG Tab Take 1 tablet (5 mg total) by mouth 3 (three) times daily.  Qty: 30 tablet, Refills: 0      tamsulosin (FLOMAX) 0.4 mg Cp24 Take 1 capsule (0.4 mg total) by mouth every evening.  Qty: 30 capsule, Refills: 0         CONTINUE these medications which have CHANGED    Details   oxycodone-acetaminophen (PERCOCET)  mg per tablet Take 1 tablet by mouth every 6 (six) hours as needed for Pain.  Qty: 40 tablet, Refills: 0         CONTINUE these medications which have NOT CHANGED    Details   aspirin (ECOTRIN) 81 MG EC tablet Take 81 mg by mouth once daily.      BD ULTRA-FINE JERMAINE PEN NEEDLES 32 gauge x 5/32" Ndle       CYANOCOBALAMIN, VITAMIN B-12, (VITAMIN B-12 ORAL) Take by mouth.      EASY TOUCH 31 X 5/16 " Ndle       liraglutide (VICTOZA " "2-ANDRE) 0.6 mg/0.1 mL (18 mg/3 mL) PnIj Inject into the skin once daily.      LYRICA 100 mg capsule 2 (two) times daily.       metoprolol succinate (TOPROL-XL) 25 MG 24 hr tablet Take 1 tablet (25 mg total) by mouth once daily.  Qty: 30 tablet, Refills: 5      midodrine (PROAMATINE) 2.5 MG Tab Take 1 tablet (2.5 mg total) by mouth 3 (three) times daily.  Qty: 90 tablet, Refills: 11    Associated Diagnoses: Orthostatic hypotension      NOVOLOG FLEXPEN 100 unit/mL InPn pen as needed.       rosuvastatin (CRESTOR) 40 MG Tab Take 1 tablet (40 mg total) by mouth every evening.  Qty: 30 tablet, Refills: 6    Associated Diagnoses: CAD (coronary artery disease)      sub-q insulin device, 20 unit Lashay by Misc.(Non-Drug; Combo Route) route.      SURE COMFORT INSULIN SYRINGE 1/2 mL 31 x 5/16" Syrg       lidocaine (XYLOCAINE) 5 % Oint ointment              Discharge Procedure Orders  Diet general     Activity as tolerated     Call MD for:  temperature >100.4     Call MD for:  persistent nausea and vomiting     Call MD for:  severe uncontrolled pain     No dressing needed       Follow-up Information     Follow up with Jose F Mclaughlin MD In 1 week.    Specialty:  Urology    Why:  repeat surgery    Contact information:    [address]  [address]  [facility] [address] [phone]            Discharge Procedure Orders (must include Diet, Follow-up, Activity):    Discharge Procedure Orders (must include Diet, Follow-up, Activity)  Diet general     Activity as tolerated     Call MD for:  temperature >100.4     Call MD for:  persistent nausea and vomiting     Call MD for:  severe uncontrolled pain     No dressing needed        "

## 2017-01-20 NOTE — IP AVS SNAPSHOT
[facility]  [address]  [facility] [address]  Phone: [phone]           Patient Discharge Instructions     Our goal is to set you up for success. This packet includes information on your condition, medications, and your home care. It will help you to care for yourself so you don't get sicker and need to go back to the hospital.     Please ask your nurse if you have any questions.        There are many details to remember when preparing to leave the hospital. Here is what you will need to do:    1. Take your medicine. If you are prescribed medications, review your Medication List in the following pages. You may have new medications to  at the pharmacy and others that you'll need to stop taking. Review the instructions for how and when to take your medications. Talk with your doctor or nurses if you are unsure of what to do.     2. Go to your follow-up appointments. Specific follow-up information is listed in the following pages. Your may be contacted by a transition nurse or clinical provider about future appointments. Be sure we have all of the phone numbers to reach you, if needed. Please contact your provider's office if you are unable to make an appointment.     3. Watch for warning signs. Your doctor or nurse will give you detailed warning signs to watch for and when to call for assistance. These instructions may also include educational information about your condition. If you experience any of warning signs to your health, call your doctor.               [facility]  Unless otherwise directed by your provider, please contact [facility], our nurse care line that is available for 24/7 assistance.     [phone] (toll-free)    Registered nurses in the [facility] provide clinical advisement, health education, appointment booking, and other advisory services.                    ** Verify the list of medication(s) below is accurate and up  "to date. Carry this with you in case of emergency. If your medications have changed, please notify your healthcare provider.             Medication List      START taking these medications        Additional Info                      oxybutynin 5 MG Tab   Commonly known as:  DITROPAN   Quantity:  30 tablet   Refills:  0   Dose:  5 mg    Instructions:  Take 1 tablet (5 mg total) by mouth 3 (three) times daily.     Begin Date    AM    Noon    PM    Bedtime       tamsulosin 0.4 mg Cp24   Commonly known as:  FLOMAX   Quantity:  30 capsule   Refills:  0   Dose:  0.4 mg    Instructions:  Take 1 capsule (0.4 mg total) by mouth every evening.     Begin Date    AM    Noon    PM    Bedtime         CHANGE how you take these medications        Additional Info                      oxycodone-acetaminophen  mg per tablet   Commonly known as:  PERCOCET   Quantity:  40 tablet   Refills:  0   Dose:  1 tablet   What changed:  when to take this    Instructions:  Take 1 tablet by mouth every 6 (six) hours as needed for Pain.     Begin Date    AM    Noon    PM    Bedtime         CONTINUE taking these medications        Additional Info                      aspirin 81 MG EC tablet   Commonly known as:  ECOTRIN   Refills:  0   Dose:  81 mg    Instructions:  Take 81 mg by mouth once daily.     Begin Date    AM    Noon    PM    Bedtime       EASY TOUCH 31 gauge x 5/16" Ndle   Refills:  0   Generic drug:  pen needle, diabetic      Begin Date    AM    Noon    PM    Bedtime       BD ULTRA-FINE JERMAINE PEN NEEDLES 32 gauge x 5/32" Ndle   Refills:  0   Generic drug:  pen needle, diabetic      Begin Date    AM    Noon    PM    Bedtime       lidocaine 5 % Oint ointment   Commonly known as:  XYLOCAINE   Refills:  0      Begin Date    AM    Noon    PM    Bedtime       LYRICA 100 MG capsule   Refills:  0   Generic drug:  pregabalin    Instructions:  2 (two) times daily.     Begin Date    AM    Noon    PM    Bedtime       metoprolol succinate 25 MG 24 " hr tablet   Commonly known as:  TOPROL-XL   Quantity:  30 tablet   Refills:  5    Instructions:  Take 1 tablet (25 mg total) by mouth once daily.     Begin Date    AM    Noon    PM    Bedtime       midodrine 2.5 MG Tab   Commonly known as:  PROAMATINE   Quantity:  90 tablet   Refills:  11   Dose:  2.5 mg    Instructions:  Take 1 tablet (2.5 mg total) by mouth 3 (three) times daily.     Begin Date    AM    Noon    PM    Bedtime       NOVOLOG FLEXPEN 100 unit/mL Inpn pen   Refills:  0   Generic drug:  insulin aspart    Instructions:  as needed.     Begin Date    AM    Noon    PM    Bedtime       rosuvastatin 40 MG Tab   Commonly known as:  CRESTOR   Quantity:  30 tablet   Refills:  6   Dose:  40 mg    Instructions:  Take 1 tablet (40 mg total) by mouth every evening.     Begin Date    AM    Noon    PM    Bedtime       sub-q insulin device, 20 unit Lashay   Refills:  0    Instructions:  by Misc.(Non-Drug; Combo Route) route.     Begin Date    AM    Noon    PM    Bedtime       SURE COMFORT INSULIN SYRINGE 0.5 mL 31 gauge x 5/16 Syrg   Refills:  0   Generic drug:  insulin syringe-needle U-100      Begin Date    AM    Noon    PM    Bedtime       VICTOZA 2-ANDRE 0.6 mg/0.1 mL (18 mg/3 mL) Pnij   Refills:  0   Generic drug:  liraglutide 0.6 mg/0.1 mL (18 mg/3 mL) subq PNIJ    Instructions:  Inject into the skin once daily.     Begin Date    AM    Noon    PM    Bedtime       VITAMIN B-12 ORAL   Refills:  0    Instructions:  Take by mouth.     Begin Date    AM    Noon    PM    Bedtime            Where to Get Your Medications      These medications were sent to [facility] [place] - [address]  [address] [address]     Phone:  [phone]     oxybutynin 5 MG Tab    oxycodone-acetaminophen  mg per tablet    tamsulosin 0.4 mg Cp24                  Please bring to all follow up appointments:    1. A copy of your discharge instructions.  2. All medicines you are  currently taking in their original bottles.  3. Identification and insurance card.    Please arrive 15 minutes ahead of scheduled appointment time.    Please call 24 hours in advance if you must reschedule your appointment and/or time.        Follow-up Information     Follow up with Jose F Mclaughlin MD In 1 week.    Specialty:  Urology    Why:  repeat surgery    Contact information:    [address]  [address]  [place] [address]  [phone]          Discharge Instructions     Future Orders    Activity as tolerated     Call MD for:  persistent nausea and vomiting     Call MD for:  severe uncontrolled pain     Call MD for:  temperature >100.4     Diet general     Questions:    Total calories:      Fat restriction, if any:      Protein restriction, if any:      Na restriction, if any:      Fluid restriction:      Additional restrictions:      No dressing needed         Discharge Instructions       Home Care Instructions  CYSTOSCOPY OR  ENDOSCOPIC SURGERY    ACTIVITY LEVEL:  If you received sedation and/or an anesthetic, you may feel sleepy for several hours. Rest until you feel more  awake. Gradually resume your normal activities.  DIET:  At home, continue with liquids. If there is no nausea, you may eat a soft diet and gradually resume your normal  diet. Drink a lot of liquids until you see your doctor.  DISCOMFORT:  You may experience a small amount of discomfort, burning on urination, and/or slight bleeding into the urine.  Sitting in a warm tub of water will relieve many of these symptoms. If needed, you may take Tylenol (normal  recommended dose) every 4 hours, for discomfort.  MEDICATIONS:  You will receive instructions for any pain and/or antibiotic prescriptions. Pain medication should be taken only  if needed, and as directed. Antibiotics should be taken as directed until the entire prescription is completed.  ADDITIONAL  INFORMATION:  ________________________________________________________________________________________  WHEN TO CALL THE DOCTOR:   If you are unable to urinate within six hours after surgery.   Fever over 101°F (38.4°C).  RETURN APPOINTMENT:  _________________________________________________________________________________________  FOR EMERGENCIES:  If any unusual problems or difficulties occur, contact  ____________________ or the resident at [phone] (page ) or at the Clinic office, [phone].        Primary Diagnosis     Your primary diagnosis was:  Kidney Stone      Admission Information     Date & Time Provider Department CSN    1/20/2017  7:02 AM Jose F Mclaughlin MD [facility] [id number]      Care Providers     Provider Role Specialty Primary office phone    Jose F Mclaughlin MD Attending Provider Urology [phone]    Jose F Mclaughlin MD Surgeon  Urology [phone]      Your Vitals Were     BP Pulse Temp Resp Height Weight    128/61 (BP Location: Right arm, Patient Position: Sitting, BP Method: Automatic) 66 98.6 °F (37 °C) (Skin) 18 6' (1.829 m) 96.2 kg (212 lb)    SpO2 BMI             96% 28.75 kg/m2         Recent Lab Values        6/29/2014 6/29/2014                       12:18 PM 11:14 PM          A1C 7.8 (H) 7.7 (H)                     Pending Labs     Order Current Status    Specimen to Pathology - Surgery Collected (01/20/17 1219)      Allergies as of 1/20/2017        Reactions    Sulfa (Sulfonamide Antibiotics) Photosensitivity    Zocor [Simvastatin] Other (See Comments)    lathargic      Advance Directives     An advance directive is a document which, in the event you are no longer able to make decisions for yourself, tells your healthcare team what kind of treatment you do or do not want to receive, or who you would like to make those decisions for you.  If you do not currently have an advance directive, [facility] encourages you to create one.  For  more information call:  [phone]), [phone] ([phone]),  or log on to [email or web address].        Language Assistance Services     ATTENTION: Language assistance services are available, free of charge. Please call [phone].      ATENCIÓN: Si habla español, tiene a pantoja disposición servicios gratuitos de asistencia lingüística. Llame al [phone].     CHÚ Ý: N?u b?n nói Ti?ng Vi?t, có các d?ch v? h? tr? ngôn ng? mi?n phí dành cho b?n. G?i s? [phone].        Diabetes Discharge Instructions                                    [facility] complies with applicable Federal civil rights laws and does not discriminate on the basis of race, color, national origin, age, disability, or sex.

## 2017-01-20 NOTE — TELEPHONE ENCOUNTER
----- Message from Gin Lopez sent at 1/20/2017  1:27 PM CST -----  Contact: [phone]  Pt would like to come in for hosp f/u 1/26 anytime in the afternoon . Please advise.

## 2017-01-20 NOTE — ANESTHESIA RELEASE NOTE
Anesthesia Release from PACU Note    Patient: Sherif Knutson Jr.    Procedure(s) Performed: Procedure(s) (LRB):  URETEROSCOPY (Left)  LITHOTRIPSY-LASER (Left)  CYSTOSCOPY (N/A)  EXTRACTION - STONE (Left)  PLACEMENT-STENT URETERAL (Left)  PYELOGRAM-RETROGRADE (Left)    Anesthesia type: general    Post pain: Adequate analgesia    Post assessment: no apparent anesthetic complications, tolerated procedure well and no evidence of recall    Last Vitals:   Visit Vitals    /61 (BP Location: Right arm, Patient Position: Sitting, BP Method: Automatic)    Pulse 66    Temp 37 °C (98.6 °F) (Skin)    Resp 18    Ht 6' (1.829 m)    Wt 96.2 kg (212 lb)    SpO2 96%    BMI 28.75 kg/m2       Post vital signs: stable    Level of consciousness: awake, alert  and oriented    Nausea/Vomiting: no nausea/no vomiting    Complications: none    Airway Patency: patent    Respiratory: unassisted, spontaneous ventilation, room air    Cardiovascular: stable and blood pressure at baseline    Hydration: euvolemic

## 2017-01-20 NOTE — PLAN OF CARE
Discharge instructions given, patient verbalized understanding. Consents in chart, Vitals stable, no complaints. Waiting for pt to urinate.

## 2017-01-20 NOTE — OP NOTE
[facility]  Operative Note    Date: 01/20/2017    Pre-Op Diagnosis:   1. left renal stone ~2cm  2. Malrotated pelvic kidney    Post-Op Diagnosis: same    Procedure(s) Performed:   1.  Left ureteroscopy, laser lithotripsy, stone basket extraction--Modifier 22  2.  Cystoscopy with left retrograde pyelogram  3.  Left ureteral stent placement  4.  Fluoro < 1 hr    Specimen(s): stone for chemical analysis    Staff Surgeon:  Jose F Mclaughlin MD    Assistant Surgeon: none    Anesthesia: General endotracheal anesthesia    Indications: Sherif Knutson Jr. is a 66 y.o. male with a left renal stone, presenting for definitive stone management.  He currently does not have a JJ ureteral stent in place.      Findings: Large, ~2cm left renal pelvis stone in a malrotated pelvic kidney.  Malrotation of the kidney resulted in severe angulation and difficulty with stone localization for laser lithotripsy.  The stone required repositioning almost continuously throughout the case which added an hour to the case and contributed to the extreme difficulty of the ureteroscopy.    1 baskets were used throughout the case.      Estimated Blood Loss: min    Drains: 6 Fr x 14 cm JJ ureteral stent without strings    Procedure in detail:  After informed consent was obtained, the patient was brought the the cystoscopy suite and placed in the supine position.  SCDs were applied and working.  Anesthesia was administered.  The patient was then placed in the dorsal lithotomy position and prepped and draped in the usual sterile fashion.      A rigid cystoscope in a 22 Fr sheath was introduced into the patient's urethra.  This passed easily.  The entire urethra was visualized which showed no strictures or masses.  Formal cystoscopy was performed which revealed no masses or lesions suspicious for malignancy, no bladder stones, no bladder diverticuli, no trabeculations.  The ureteral orifices were visualized in the normal anatomic position  bilaterally and clear efflux was visualized.      A motion wire was passed up the left ureteral orifice and up into the kidney.  This passed easily and placement was confirmed using fluoro. A 5fr ureteral catheter was inserted over the wire and a retrograde pyelogram was performed to delineate the anatomy.  The ureter crossed midline and inserted into the renal pelvis in the patient's anatomic pelvis.  The motion wire was then reinserted.  The cystoscope was placed in the bladder.  A second amplatz wire was then passed up the left ureteral orifice again confirmed using fluoro.  The cystoscope was removed keeping the guidewires in place and one of the wires was secured to the drape.      A 35cm ureteral access sheath was then passed over the free wire under fluoroscopic guidance.  Subsequently, the flexible ureteroscope was passed into the patient's ureter through the access sheath under direct vision. A stone was encountered at the level of the renal pelvis.  However, due to the tortuosity of the ureter and malrotation, the stone required innumerable attempts at repositioning in order to be accessible with a laser fiber.  This added at least 1 hour to the case.      A 200 micron laser fiber was passed through the ureteroscope.  The stone was fragmented using the laser.  A majority of this very large stone was dusted.  However, multiple fragments were created.  The laser fiber was removed and a Nitinol tipless basket was introduced through the ureteroscope.      Stone fragments were removed and collected for specimen analysis. However, removing the fragments proved very difficult as the UPJ appeared slightly narrow and the ureter was tortuous as previously described.  Therefore, the decision was made to place a stent and return in a staged fashion for further lithotripsy and stone basket extraction.     A retrograde pyelogram was performed through the ureteroscope.  There were no filling defects noted and the renal  pelvis was opacified.  The ureteroscope was removed keeping the motion wire in place.  The entire course of the ureter was visualized as the ureteroscope and access sheath were simultaneously removed.  There were no significant ureteral fragments left behind.     A 6 Fr x 14 cm JJ ureteral stent without strings was passed over the wire and up into the renal pelvis using fluoro.  When the coil appeared to be in good position in the kidney and the radio-opaque marker of the pusher was at the inferior pubis, the wire was removed under continuous fluoro.  Good coils were seen in the kidney and the bladder using fluoro.      The patient tolerated the procedure well and was transferred to the recovery room in stable condition.      Disposition:  The patient will follow up in 1-2 weeks for repeat ureteroscopy.      Jose F Mclaughlin MD

## 2017-01-20 NOTE — ANESTHESIA POSTPROCEDURE EVALUATION
Anesthesia Post Evaluation    Patient: Sherif Knutson Jr.    Procedure(s) Performed: Procedure(s) (LRB):  URETEROSCOPY (Left)  LITHOTRIPSY-LASER (Left)  CYSTOSCOPY (N/A)  EXTRACTION - STONE (Left)  PLACEMENT-STENT URETERAL (Left)  PYELOGRAM-RETROGRADE (Left)    Final Anesthesia Type: general  Patient location during evaluation: PACU  Patient participation: Yes- Able to Participate  Level of consciousness: awake and alert, awake and oriented  Post-procedure vital signs: reviewed and stable  Pain management: adequate  Airway patency: patent  PONV status at discharge: No PONV  Anesthetic complications: no      Cardiovascular status: blood pressure returned to baseline and hemodynamically stable  Respiratory status: unassisted, spontaneous ventilation and room air  Hydration status: euvolemic  Follow-up not needed.        Visit Vitals    /61 (BP Location: Right arm, Patient Position: Sitting, BP Method: Automatic)    Pulse 66    Temp 37 °C (98.6 °F) (Skin)    Resp 18    Ht 6' (1.829 m)    Wt 96.2 kg (212 lb)    SpO2 96%    BMI 28.75 kg/m2       Pain/Crystal Score: Pain Assessment Performed: Yes (1/20/2017 12:49 PM)  Presence of Pain: denies (1/20/2017 12:49 PM)  Pain Rating Prior to Med Admin: 6 (1/20/2017 12:58 PM)  Crystal Score: 10 (1/20/2017 12:49 PM)

## 2017-01-20 NOTE — H&P (VIEW-ONLY)
Subjective:      Patient ID: Sherif Knutson Jr. is a 66 y.o. male.    Chief Complaint: Nephrolithiasis    Patient is a 66 y.o. male who is new to our clinic and self-referred for evaluation of nephrolithiasis.     HPI  The patient comes with outside imaging confirming bilateral nephrolithiasis.  He reports intermittent left sided abdominal and flank pain. No fevers or chills.  He did have hematuria which prompted a work-up, and this is how he came to have the CT urogram.  He reports having had a cystoscopy which was by his report normal.  He has a long history of kidney stones.  No dysuria.  No fevers or chills.  No nausea/vomiting.       CT scan was independently reviewed today and reveals non-obstructing right renal stones without hydronephrosis.  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  No left ureteral stones.      Review of Systems   All other systems reviewed and negative except pertinent positives noted in HPI.    Objective:     Physical Exam   Constitutional: He is oriented to person, place, and time. He appears well-developed and well-nourished. No distress.   HENT:   Head: Normocephalic and atraumatic.   Eyes: No scleral icterus.   Neck: No tracheal deviation present.   Pulmonary/Chest: Effort normal. No respiratory distress.   Abdominal:       Neurological: He is alert and oriented to person, place, and time.   Psychiatric: He has a normal mood and affect. His behavior is normal. Judgment and thought content normal.     Assessment:     1. Nephrolithiasis      Plan:     1. Kidney stones:  -General risk factors for kidney stones and the conservative measures to prevent kidney stones in the future were discussed with the patient in detail.  The patient was encouraged to drink 2-3 liters of water a day, limit iced tea and junior as well as foods high in oxalate.  They were cautioned to try to limit salt and red meat intake.  We also  discussed adding citrate to the diet with the addition of dale or lemon juice to their water or alternatively with crystal light.   -CT scan was independently reviewed today and reveals as above.  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  -Extensive conversation had with the patient and films reviewed with him and his wife.  Discussed that PCNL and ESWL would either be not feasible or inadequate given his anatomy and stone burden.  We discussed the risks/benefits of both a robotic and a potentially staged endoscopic approach.  The patient would prefer ureteroscopy.  -I have explained the indication, risks, benefits, and alternatives of the procedure in detail.  Risks including but not limited to bleeding, infection, injury to the urethra, bladder, ureter, need for additional treatments, inability or incomplete removal of kidney stones, pain, and discomfort related to the stent were discussed in depth with the patient.  The patient voices understanding and all questions have been answered.  The patient agrees to proceed as planned with left ureteroscopy, laser lithotripsy, and ureteral stent placement.  -Urine dip: pH 5, -leuk, -nitrite, +gluc, +prot, +blood  -ua/urine culture  -labs/pat's  -plan for ureteroscopy at [facility] per patient preference.   -I spent over 45 minutes with the patient. Over 50% of the visit was spent in counseling.

## 2017-01-20 NOTE — DISCHARGE INSTRUCTIONS
Home Care Instructions  CYSTOSCOPY OR  ENDOSCOPIC SURGERY    ACTIVITY LEVEL:  If you received sedation and/or an anesthetic, you may feel sleepy for several hours. Rest until you feel more  awake. Gradually resume your normal activities.  DIET:  At home, continue with liquids. If there is no nausea, you may eat a soft diet and gradually resume your normal  diet. Drink a lot of liquids until you see your doctor.  DISCOMFORT:  You may experience a small amount of discomfort, burning on urination, and/or slight bleeding into the urine.  Sitting in a warm tub of water will relieve many of these symptoms. If needed, you may take Tylenol (normal  recommended dose) every 4 hours, for discomfort.  MEDICATIONS:  You will receive instructions for any pain and/or antibiotic prescriptions. Pain medication should be taken only  if needed, and as directed. Antibiotics should be taken as directed until the entire prescription is completed.  ADDITIONAL INFORMATION:  ________________________________________________________________________________________  WHEN TO CALL THE DOCTOR:   If you are unable to urinate within six hours after surgery.   Fever over 101°F (38.4°C).  RETURN APPOINTMENT:  _________________________________________________________________________________________  FOR EMERGENCIES:  If any unusual problems or difficulties occur, contact  ____________________ or the resident at [phone] (page ) or at the Clinic office, [phone].

## 2017-01-20 NOTE — INTERVAL H&P NOTE
The patient has been examined and the H&P has been reviewed:    I concur with the findings and no changes have occurred since H&P was written.   I have explained the indication, risks, benefits, and alternatives of the procedure in detail.  Risks including but not limited to bleeding, infection, injury to the urethra, bladder, ureter, need for additional treatments, inability or incomplete removal of kidney stones, pain, and discomfort related to the stent were discussed in depth with the patient.  The patient voices understanding and all questions have been answered.  The patient agrees to proceed as planned with left ureteroscopy, laser lithotripsy, and ureteral stent placement.      Anesthesia/Surgery risks, benefits and alternative options discussed and understood by patient/family.          Active Hospital Problems    Diagnosis  POA    Nephrolithiasis [N20.0]  Yes      Resolved Hospital Problems    Diagnosis Date Resolved POA   No resolved problems to display.

## 2017-01-23 ENCOUNTER — TELEPHONE (OUTPATIENT)
Dept: UROLOGY | Facility: CLINIC | Age: 67
End: 2017-01-23

## 2017-01-23 DIAGNOSIS — N20.0 NEPHROLITHIASIS: Primary | ICD-10-CM

## 2017-01-27 ENCOUNTER — ANESTHESIA EVENT (OUTPATIENT)
Dept: SURGERY | Facility: HOSPITAL | Age: 67
End: 2017-01-27
Payer: MEDICARE

## 2017-01-27 NOTE — ANESTHESIA PREPROCEDURE EVALUATION
Anesthesia Assessment: Preoperative EQUATION     Planned Procedure: Procedure(s) (LRB):  URETEROSCOPY (Left)  LITHOTRIPSY-LASER (N/A)  Requested Anesthesia Type:General  Surgeon: Jose F Mclaughlin MD  Service: Urology  Known or anticipated Date of Surgery:2/3/17  1/27/2017  AQ Update for upcoming surgery scheduled for 2/3/2017:  Spoke with patient today. He stated he had no adverse effects from anesthesia.   He does have some burning and pain upon urination but denied any chest discomfort, shortness of breath or any other problems. He was re-instructed on which medications to take/hold prior to surgery as well as pre-op instructions the night before/day of surgery. Patient verbalized understanding and all questions were answered.   Alyssa Alvarez RN     Surgeon notes: reviewed     Electronic QUestionnaire Assessment completed via nurse interview with patient.                    Sherif Knutson Jr. [[id number]] - 66 y.o. Male         Providers Outside of [facility]             Pre-admit from 1/20/2017 in [facility]      Has outside PCP   Yes        Surgical Risk Level       Surgical Risk Level:   1              caRDScore (Clinical Anesthesia Rapid Decision Score)         Moderate  Total Score: 20       20 Sum of Clinical Scores        caRDScores (Grouped)       caRDScore - Ane:   3                       caRDScore - CVD:   6                       caRDScore - Pul:   1                       caRDScore - Met:   5                       caRDScore - Phy:   5              caRDScore Items             Pre-admit from 1/20/2017 in [facility]      Anesthesia        Has had S/P back surgery for Disc/Fusion/Scoliosis   Yes [2011-Laminectomy]      Has sleep apnea confirmed by a sleep study / on CPAP   Yes      CVD        Activity similar to best ability for maximal activity or exercise   METS 5      Diagnosed with high blood pressure   Yes      Typical BP runs <150/90   Yes      Has coronary artery  disease   Yes      Has at least one stent in a coronary artery   Yes      S/P angioplasty (balloon) in a coronary artery   Yes      Has had bypass surgery (CABG)   Yes      Pulmonary        Has sleep apnea confirmed by a sleep study / on CPAP   Yes      Metabolic        Has kidney problem, NOT followed by nephrologist   Yes [Kidney stones]      Type 2 Diabetes   Yes      Taking chronic insulin via home insulin pump   Yes [Novolog sub-q Lashay device ]      Has hyperlipoproteinemia   Yes      Physiologic        Obesity Status   Not Obese (BMI <30)      Aspirin (taken because I have stents)   Yes      Recent syncope, pre-syncope or weakness or other unusual spell   Yes        Flags       Red Flag Score:   2                       Yellow Flag Score:   7              Red Flags             Pre-admit from 1/20/2017 in [facility]      Obesity Status   Not Obese (BMI <30)      Aspirin (taken because I have stents)   Yes      Taking chronic insulin via home insulin pump   Yes [Novolog sub-q Lashay device ]        Yellow Flags             Pre-admit from 1/20/2017 in [facility]      Takes herbal medications or vitamin supplements   Yes      Obesity Status   Not Obese (BMI <30)      Aspirin   Yes      Has sleep apnea confirmed by a sleep study / on CPAP   Yes      Has at least one stent in a coronary artery   Yes      Recent syncope, pre-syncope or weakness or other unusual spell   Yes      Has kidney problem, NOT followed by nephrologist   Yes [Kidney stones]      Has pain   Yes        PONV Risk Score (assumes periop narcotic use = +1, Max=4)       PONV Risk Score:   2              PONV Risk Factors  Total Score: 1       1 Non-Smoker at present        Sleep Apnea  Total Score: 1       1 Has sleep apnea confirmed by a sleep study / on CPAP        EMELI STOP-Bang Risk Factors (Max=8)  Total Score: 3       1 Takes medication for high blood pressure      1 Age >50      1 Male        EMELI Risk Level  - 1 (Low), 2 (Moderate), 3 (High)       EMELI Risk Level:   2              RCRI (Revised Cardiac Risk Indices of ACC/AHA guidelines, Max=6)  Total Score: 2       1 Has/has had CAD      1 Taking chronic insulin        CAD Risk Factors  Total Score: 4       1 Male. Age >45      1 Diagnosed with high blood pressure      1 Has Diabetes      1 Has hyperlipoproteinemia        CHADS Score if applicable (history of atrial fib/flutter, Max=6)  Total Score: 2       1 Diagnosed with high blood pressure      1 Has Diabetes        Maximal Exercise Capacity             Pre-admit from 1/20/2017 in [facility]      Maximal Exercise Capacity   METS 5        Summary of Dependence  Total Score: 1       1 Is totally independent of others for activities of daily living        Phone Fraility Score (Max = 17)  Total Score: 1       1 Uses 5 or more meds on reg basis        Pain Factors             Pre-admit from 1/20/2017 in [facility]      Has pain   Yes      Location and description of pain   -- [Kidney stone, lower back pain]      Duration of pain   Pain is chronic, has been present greater than 12 mo [Back pain]      Typical Pain Scores   7 to 10      Depends on strong Rx (opioids, adjunctive meds)   Yes      Uses one of the following medications:   -- [Percocet]        Risk Triggers (Evidence-Based Risk Triggers)         Pulmonary Risk Triggers  Total Score: 2       1 Age > or = 60      1 Has sleep apnea confirmed by a sleep study / on CPAP        Renal Risk Triggers  Total Score: 4       1 Diagnosed with high blood pressure      1 Has kidney problem, NOT followed by nephrologist      1 Type 2 Diabetes      1 Taking chronic insulin via home insulin pump        Delirium Risk Triggers  Total Score: 0         Urologic Risk Triggers  Total Score: 0         Logistics         Pre-op Clinic Logistics  Total Score: 18       1 Has outside PCP      2 Takes herbal medications or vitamin supplements      1 Has  had anesthesia, either as adult or as a child      3 Aspirin (taken because I have stents)      1 Has had S/P back surgery for Disc/Fusion/Scoliosis      1 Takes medication for high blood pressure      1 Has coronary artery disease      1 Has at least one stent in a coronary artery      1 Currently taking medication for coronary disease      2 Recent syncope, pre-syncope or weakness or other unusual spell      1 Has chronic pain / depends on strong Rx (opioids, adjunctive meds)      3 Taking chronic insulin via home insulin pump        DOSC Logistics  Total Score: 4       1 Aspirin (taken because I have stents)      3 Taking chronic insulin via home insulin pump        Discharge Logistics  Total Score: 2       1 Has had S/P back surgery for Disc/Fusion/Scoliosis      1 Has sleep apnea confirmed by a sleep study / on CPAP        Discharge Planning             Pre-admit from 1/20/2017 in [facility]      Discharge Planning        Discharge plans   Home with assistance      Will assist patient 24/7, if needed   -- [wife]        Fast Track <For office use only>       Total Score: 16          Surgical Risk Level Assessment                       Triage considerations:      The patient has no apparent active cardiac condition (No unstable coronary Syndrome such as severe unstable angina or recent [<1 month] myocardial infarction, decompensated CHF, severe valvular disease or significant arrhythmia)     Previous anesthesia records:Not available     Subspecialty notes: Cardiology: General, Neurology     Other important co-morbidities: CAD, CABG, DM-2, EMELI, HTN, HLD, Laminectomy      Tests already available:  Available tests, within 3 months , outside [facility] , within [facility] . 12/12/16 A1C-7.9, 1/2017 CMP, UA, EKG, X-Ray Chest 10/2016 CBC, TSH, Lipid Panel. Outside records scanned into [facility]      Instructions given. (See in Nurse's note)     Optimization:   Sub-specialist consult indicated: Cardiology  "Clearance Indicated per Dr ODALYS Mclaughlin       Plan:     Testing: None needed  Consultation:Cardiology Clearance   1/18/17  Preoperative cardiovascular examination  The patient's revised cardiovascular risk is 6.6% for significant CV events for lithotripsy but up to 11% if he requires an open procedure.  Instructed to take his beta blocker the evening prior to surgery. If converted to an open procedure consider applying 1 " nitrol ointment on call to the OR.  Coronary artery disease involving native coronary artery of native heart without angina pectoris     Patient has previously scheduled Medical Appointment: None prior to surgery     Navigation: Tests Scheduled. None    1/27/17  Patient was recently Optimized by Cardiologist on 1/18/17.  AQ updated. Fast Track to surgery   Alyssa Alvarez RN                                                                                                                   01/27/2017  Sherif Knutson JrNela is a 66 y.o., male.    [facility] Anesthesia Evaluation    I have reviewed the Patient Summary Reports.      I have reviewed the Medications.     Review of Systems  Anesthesia Hx:  No problems with previous Anesthesia    Social:  Non-Smoker, No Alcohol Use    Hematology/Oncology:  Hematology Normal   Oncology Normal     EENT/Dental:EENT/Dental Normal   Cardiovascular:   Hypertension CAD  CABG/stent (s/p PTCA)  PVD (Left Subclavian artery stenosis ) hyperlipidemia    Pulmonary:   Shortness of breath Sleep Apnea    Renal/:   renal calculi    Neurological:  Neurology Normal    Endocrine:   Diabetes, well controlled, type 2        Physical Exam  General:  Well nourished    Airway/Jaw/Neck:  Airway Findings: Mouth Opening: Normal Tongue: Normal  Mallampati: II  TM Distance: Normal, at least 6 cm      Dental:  Dental Findings: In tact   Chest/Lungs:  Chest/Lungs Findings: Clear to auscultation, Normal Respiratory Rate     Heart/Vascular:  Heart Findings: Rate: Normal  Rhythm: Regular Rhythm      "   Mental Status:  Mental Status Findings:  Cooperative, Alert and Oriented         Anesthesia Plan  Type of Anesthesia, risks & benefits discussed:  Anesthesia Type:  general  Patient's Preference: same   Intra-op Monitoring Plan: standard ASA monitors  Intra-op Monitoring Plan Comments:   Post Op Pain Control Plan:   Post Op Pain Control Plan Comments:   Induction:   IV  Beta Blocker:  Patient is not currently on a Beta-Blocker (No further documentation required).       Informed Consent: Patient understands risks and agrees with Anesthesia plan.  Questions answered. Anesthesia consent signed with patient.  ASA Score: 3     Day of Surgery Review of History & Physical:    H&P update referred to the surgeon.         Ready For Surgery From Anesthesia Perspective.

## 2017-02-02 ENCOUNTER — TELEPHONE (OUTPATIENT)
Dept: UROLOGY | Facility: CLINIC | Age: 67
End: 2017-02-02

## 2017-02-02 NOTE — TELEPHONE ENCOUNTER
Called pt to confirm arrival time of 8:15a for procedure on 2/3/2017. Gave pt NPO instructions and gave pt opportunity to ask questions. Pt verbalized understanding.

## 2017-02-03 ENCOUNTER — HOSPITAL ENCOUNTER (OUTPATIENT)
Facility: HOSPITAL | Age: 67
Discharge: HOME | End: 2017-02-03
Attending: UROLOGY | Admitting: UROLOGY
Payer: MEDICARE

## 2017-02-03 ENCOUNTER — ANESTHESIA (OUTPATIENT)
Dept: SURGERY | Facility: HOSPITAL | Age: 67
End: 2017-02-03
Payer: MEDICARE

## 2017-02-03 ENCOUNTER — SURGERY (OUTPATIENT)
Age: 67
End: 2017-02-03

## 2017-02-03 VITALS
HEART RATE: 65 BPM | TEMPERATURE: 98 F | SYSTOLIC BLOOD PRESSURE: 131 MMHG | HEIGHT: 72 IN | WEIGHT: 208 LBS | BODY MASS INDEX: 28.17 KG/M2 | RESPIRATION RATE: 18 BRPM | OXYGEN SATURATION: 100 % | DIASTOLIC BLOOD PRESSURE: 81 MMHG

## 2017-02-03 DIAGNOSIS — N13.5 URETERAL OBSTRUCTION: ICD-10-CM

## 2017-02-03 DIAGNOSIS — N20.9 UROLITHIASIS: ICD-10-CM

## 2017-02-03 LAB
POCT GLUCOSE: 124 MG/DL (ref 70–110)
POCT GLUCOSE: 160 MG/DL (ref 70–110)

## 2017-02-03 PROCEDURE — 63600175 PHARM REV CODE 636 W HCPCS: Performed by: ANESTHESIOLOGY

## 2017-02-03 PROCEDURE — 27200921 HC BAG, CYSTO DRAINAGE: Performed by: UROLOGY

## 2017-02-03 PROCEDURE — C2617 STENT, NON-COR, TEM W/O DEL: HCPCS | Performed by: UROLOGY

## 2017-02-03 PROCEDURE — 63600175 PHARM REV CODE 636 W HCPCS: Performed by: NURSE ANESTHETIST, CERTIFIED REGISTERED

## 2017-02-03 PROCEDURE — 25000003 PHARM REV CODE 250: Performed by: STUDENT IN AN ORGANIZED HEALTH CARE EDUCATION/TRAINING PROGRAM

## 2017-02-03 PROCEDURE — C1769 GUIDE WIRE: HCPCS | Performed by: UROLOGY

## 2017-02-03 PROCEDURE — 36000707: Performed by: UROLOGY

## 2017-02-03 PROCEDURE — 37000008 HC ANESTHESIA 1ST 15 MINUTES: Performed by: UROLOGY

## 2017-02-03 PROCEDURE — 27200971 HC CYSTO SUPPLY II (SCOPE PRCDR.): Performed by: UROLOGY

## 2017-02-03 PROCEDURE — 27200932: Performed by: UROLOGY

## 2017-02-03 PROCEDURE — 37000009 HC ANESTHESIA EA ADD 15 MINS: Performed by: UROLOGY

## 2017-02-03 PROCEDURE — 25500020 PHARM REV CODE 255: Performed by: UROLOGY

## 2017-02-03 PROCEDURE — 25000003 PHARM REV CODE 250: Performed by: NURSE ANESTHETIST, CERTIFIED REGISTERED

## 2017-02-03 PROCEDURE — 27200917: Performed by: UROLOGY

## 2017-02-03 PROCEDURE — 63600175 PHARM REV CODE 636 W HCPCS

## 2017-02-03 PROCEDURE — D9220A PRA ANESTHESIA: Mod: ANES,,, | Performed by: ANESTHESIOLOGY

## 2017-02-03 PROCEDURE — 52356 CYSTO/URETERO W/LITHOTRIPSY: CPT | Mod: LT,,, | Performed by: UROLOGY

## 2017-02-03 PROCEDURE — D9220A PRA ANESTHESIA: Mod: CRNA,,, | Performed by: NURSE ANESTHETIST, CERTIFIED REGISTERED

## 2017-02-03 PROCEDURE — 25000003 PHARM REV CODE 250: Performed by: ANESTHESIOLOGY

## 2017-02-03 PROCEDURE — 71000039 HC RECOVERY, EACH ADD'L HOUR: Performed by: UROLOGY

## 2017-02-03 PROCEDURE — 71000015 HC POSTOP RECOV 1ST HR: Performed by: UROLOGY

## 2017-02-03 PROCEDURE — 76000 FLUOROSCOPY <1 HR PHYS/QHP: CPT | Mod: 26,59,, | Performed by: UROLOGY

## 2017-02-03 PROCEDURE — 36000706: Performed by: UROLOGY

## 2017-02-03 PROCEDURE — 71000033 HC RECOVERY, INTIAL HOUR: Performed by: UROLOGY

## 2017-02-03 PROCEDURE — C1894 INTRO/SHEATH, NON-LASER: HCPCS | Performed by: UROLOGY

## 2017-02-03 PROCEDURE — 27000212: Performed by: UROLOGY

## 2017-02-03 RX ORDER — SODIUM CHLORIDE 9 MG/ML
INJECTION, SOLUTION INTRAVENOUS CONTINUOUS
Status: DISCONTINUED | OUTPATIENT
Start: 2017-02-03 | End: 2017-02-03 | Stop reason: HOSPADM

## 2017-02-03 RX ORDER — HYDROMORPHONE HYDROCHLORIDE 1 MG/ML
INJECTION, SOLUTION INTRAMUSCULAR; INTRAVENOUS; SUBCUTANEOUS
Status: COMPLETED
Start: 2017-02-03 | End: 2017-02-03

## 2017-02-03 RX ORDER — SODIUM CHLORIDE 0.9 % (FLUSH) 0.9 %
3 SYRINGE (ML) INJECTION
Status: DISCONTINUED | OUTPATIENT
Start: 2017-02-03 | End: 2017-02-03 | Stop reason: HOSPADM

## 2017-02-03 RX ORDER — HYDROCODONE BITARTRATE AND ACETAMINOPHEN 5; 325 MG/1; MG/1
1 TABLET ORAL EVERY 4 HOURS PRN
Status: DISCONTINUED | OUTPATIENT
Start: 2017-02-03 | End: 2017-02-03 | Stop reason: HOSPADM

## 2017-02-03 RX ORDER — ONDANSETRON HYDROCHLORIDE 2 MG/ML
INJECTION, SOLUTION INTRAMUSCULAR; INTRAVENOUS
Status: DISCONTINUED | OUTPATIENT
Start: 2017-02-03 | End: 2017-02-03

## 2017-02-03 RX ORDER — FENTANYL CITRATE 50 UG/ML
INJECTION, SOLUTION INTRAMUSCULAR; INTRAVENOUS
Status: DISCONTINUED | OUTPATIENT
Start: 2017-02-03 | End: 2017-02-03

## 2017-02-03 RX ORDER — SODIUM CHLORIDE 0.9 % (FLUSH) 0.9 %
3 SYRINGE (ML) INJECTION EVERY 8 HOURS
Status: DISCONTINUED | OUTPATIENT
Start: 2017-02-03 | End: 2017-02-03 | Stop reason: HOSPADM

## 2017-02-03 RX ORDER — PHENYLEPHRINE HYDROCHLORIDE 10 MG/ML
INJECTION INTRAVENOUS
Status: DISCONTINUED | OUTPATIENT
Start: 2017-02-03 | End: 2017-02-03

## 2017-02-03 RX ORDER — LIDOCAINE HCL/PF 100 MG/5ML
SYRINGE (ML) INTRAVENOUS
Status: DISCONTINUED | OUTPATIENT
Start: 2017-02-03 | End: 2017-02-03

## 2017-02-03 RX ORDER — HYDROMORPHONE HYDROCHLORIDE 1 MG/ML
0.2 INJECTION, SOLUTION INTRAMUSCULAR; INTRAVENOUS; SUBCUTANEOUS EVERY 5 MIN PRN
Status: DISCONTINUED | OUTPATIENT
Start: 2017-02-03 | End: 2017-02-03 | Stop reason: HOSPADM

## 2017-02-03 RX ORDER — PROPOFOL 10 MG/ML
INJECTION, EMULSION INTRAVENOUS
Status: DISCONTINUED | OUTPATIENT
Start: 2017-02-03 | End: 2017-02-03

## 2017-02-03 RX ORDER — ONDANSETRON 2 MG/ML
4 INJECTION INTRAMUSCULAR; INTRAVENOUS DAILY PRN
Status: DISCONTINUED | OUTPATIENT
Start: 2017-02-03 | End: 2017-02-03 | Stop reason: HOSPADM

## 2017-02-03 RX ORDER — OXYCODONE AND ACETAMINOPHEN 5; 325 MG/1; MG/1
1 TABLET ORAL EVERY 4 HOURS PRN
Qty: 31 TABLET | Refills: 0 | Status: SHIPPED | OUTPATIENT
Start: 2017-02-03 | End: 2017-03-09

## 2017-02-03 RX ORDER — POLYETHYLENE GLYCOL 3350 17 G/17G
17 POWDER, FOR SOLUTION ORAL DAILY
Qty: 30 PACKET | Refills: 0 | Status: SHIPPED | OUTPATIENT
Start: 2017-02-03 | End: 2017-07-18

## 2017-02-03 RX ORDER — LIDOCAINE HYDROCHLORIDE 10 MG/ML
1 INJECTION, SOLUTION EPIDURAL; INFILTRATION; INTRACAUDAL; PERINEURAL ONCE
Status: COMPLETED | OUTPATIENT
Start: 2017-02-03 | End: 2017-02-03

## 2017-02-03 RX ORDER — TAMSULOSIN HYDROCHLORIDE 0.4 MG/1
0.4 CAPSULE ORAL DAILY
Qty: 30 CAPSULE | Refills: 0 | Status: SHIPPED | OUTPATIENT
Start: 2017-02-03 | End: 2017-03-09

## 2017-02-03 RX ORDER — ROCURONIUM BROMIDE 10 MG/ML
INJECTION, SOLUTION INTRAVENOUS
Status: DISCONTINUED | OUTPATIENT
Start: 2017-02-03 | End: 2017-02-03

## 2017-02-03 RX ORDER — MIDAZOLAM HYDROCHLORIDE 5 MG/ML
INJECTION INTRAMUSCULAR; INTRAVENOUS
Status: DISCONTINUED | OUTPATIENT
Start: 2017-02-03 | End: 2017-02-03

## 2017-02-03 RX ORDER — GLYCOPYRROLATE 0.2 MG/ML
INJECTION INTRAMUSCULAR; INTRAVENOUS
Status: DISCONTINUED | OUTPATIENT
Start: 2017-02-03 | End: 2017-02-03

## 2017-02-03 RX ORDER — ONDANSETRON 8 MG/1
8 TABLET, ORALLY DISINTEGRATING ORAL EVERY 8 HOURS PRN
Status: DISCONTINUED | OUTPATIENT
Start: 2017-02-03 | End: 2017-02-03 | Stop reason: HOSPADM

## 2017-02-03 RX ADMIN — HYDROMORPHONE HYDROCHLORIDE 0.2 MG: 1 INJECTION, SOLUTION INTRAMUSCULAR; INTRAVENOUS; SUBCUTANEOUS at 01:02

## 2017-02-03 RX ADMIN — HYDROMORPHONE HYDROCHLORIDE 0.2 MG: 1 INJECTION, SOLUTION INTRAMUSCULAR; INTRAVENOUS; SUBCUTANEOUS at 12:02

## 2017-02-03 RX ADMIN — EPHEDRINE SULFATE 5 MG: 50 INJECTION, SOLUTION INTRAMUSCULAR; INTRAVENOUS; SUBCUTANEOUS at 10:02

## 2017-02-03 RX ADMIN — Medication 2 G: at 10:02

## 2017-02-03 RX ADMIN — ROCURONIUM BROMIDE 30 MG: 10 INJECTION, SOLUTION INTRAVENOUS at 10:02

## 2017-02-03 RX ADMIN — PHENYLEPHRINE HYDROCHLORIDE 200 MCG: 10 INJECTION INTRAVENOUS at 10:02

## 2017-02-03 RX ADMIN — LIDOCAINE HYDROCHLORIDE 10 MG: 10 INJECTION, SOLUTION EPIDURAL; INFILTRATION; INTRACAUDAL; PERINEURAL at 09:02

## 2017-02-03 RX ADMIN — SODIUM CHLORIDE: 0.9 INJECTION, SOLUTION INTRAVENOUS at 09:02

## 2017-02-03 RX ADMIN — GLYCOPYRROLATE 0.6 MG: 0.2 INJECTION, SOLUTION INTRAMUSCULAR; INTRAVENOUS at 12:02

## 2017-02-03 RX ADMIN — EPHEDRINE SULFATE 10 MG: 50 INJECTION, SOLUTION INTRAMUSCULAR; INTRAVENOUS; SUBCUTANEOUS at 10:02

## 2017-02-03 RX ADMIN — IOHEXOL 50 ML: 300 INJECTION, SOLUTION INTRAVENOUS at 12:02

## 2017-02-03 RX ADMIN — SODIUM CHLORIDE, SODIUM GLUCONATE, SODIUM ACETATE, POTASSIUM CHLORIDE, MAGNESIUM CHLORIDE, SODIUM PHOSPHATE, DIBASIC, AND POTASSIUM PHOSPHATE: .53; .5; .37; .037; .03; .012; .00082 INJECTION, SOLUTION INTRAVENOUS at 11:02

## 2017-02-03 RX ADMIN — EPHEDRINE SULFATE 10 MG: 50 INJECTION, SOLUTION INTRAMUSCULAR; INTRAVENOUS; SUBCUTANEOUS at 11:02

## 2017-02-03 RX ADMIN — FENTANYL CITRATE 100 MCG: 50 INJECTION, SOLUTION INTRAMUSCULAR; INTRAVENOUS at 10:02

## 2017-02-03 RX ADMIN — ONDANSETRON 4 MG: 2 INJECTION, SOLUTION INTRAMUSCULAR; INTRAVENOUS at 11:02

## 2017-02-03 RX ADMIN — LIDOCAINE HYDROCHLORIDE 100 MG: 20 INJECTION, SOLUTION INTRAVENOUS at 10:02

## 2017-02-03 RX ADMIN — PROPOFOL 120 MG: 10 INJECTION, EMULSION INTRAVENOUS at 10:02

## 2017-02-03 RX ADMIN — FENTANYL CITRATE 50 MCG: 50 INJECTION, SOLUTION INTRAMUSCULAR; INTRAVENOUS at 10:02

## 2017-02-03 RX ADMIN — HYDROCODONE BITARTRATE AND ACETAMINOPHEN 1 TABLET: 5; 325 TABLET ORAL at 12:02

## 2017-02-03 RX ADMIN — PHENYLEPHRINE HYDROCHLORIDE 100 MCG: 10 INJECTION INTRAVENOUS at 10:02

## 2017-02-03 RX ADMIN — MIDAZOLAM 2 MG: 5 INJECTION INTRAMUSCULAR; INTRAVENOUS at 10:02

## 2017-02-03 NOTE — ANESTHESIA RELEASE NOTE
Anesthesia Release from PACU Note    Patient: Sherif Knutson Jr.    Procedure(s) Performed: Procedure(s) (LRB):  URETEROSCOPY (Left)  LITHOTRIPSY-LASER (N/A)  MANIPULATION-STONE (Left)  PYELOGRAM-RETROGRADE (Left)  PLACEMENT-STENT URETERAL (Left)    Anesthesia type: GEN    Post pain: Adequate analgesia reported    Post assessment: no apparent anesthetic complications, tolerated procedure well and no evidence of recall    Post vital signs:   Visit Vitals    /81 (BP Location: Left arm, Patient Position: Sitting, BP Method: Automatic)    Pulse 65    Temp 36.9 °C (98.4 °F) (Skin)    Resp 18    Ht 6' (1.829 m)    Wt 94.3 kg (208 lb)    SpO2 100%    BMI 28.21 kg/m2       Level of consciousness: awake, alert and oriented    Nausea/Vomiting: no nausea/no vomiting    Complications: none    Airway Patency: patent    Respiratory: unassisted, spontaneous ventilation, room air    Cardiovascular: stable and blood pressure at baseline    Hydration: euvolemic

## 2017-02-03 NOTE — H&P
[facility]  Urology  History & Physical    Patient Name: Sherif Knutson Jr.  MRN: [id number]  Admission Date: 2/3/2017  Code Status: Prior   Attending Provider: Jose F Mclaughlin MD  Primary Care Physician: Jose F Chicas MD  Principal Problem:<principal problem not specified>    Subjective:     HPI: patient is a 65 yo male here for repeat left ureteroscopy.  Had stage 1 of left ureteroscopy and laser lithotripsy 2 weeks ago.  No events since that time.  Feels well.  No fevers.     Past Medical History   Diagnosis Date    Coronary artery disease 2006, 5/2013     s/p pci to rca (2006), LCx (5/2013)    DM (diabetes mellitus)     HLD (hyperlipidemia)     HTN (hypertension)     Kidney stone     EMELI (obstructive sleep apnea) 11/2/2015    Subclavian artery stenosis, left        Past Surgical History   Procedure Laterality Date    Coronary angioplasty      Back surgery      Tonsillectomy      Cholecystectomy  2011    Coronary artery bypass graft  2000     5 bypass surgeries       Review of patient's allergies indicates:   Allergen Reactions    Sulfa (sulfonamide antibiotics) Photosensitivity    Zocor [simvastatin] Other (See Comments)     lathargic       Family History     Problem Relation (Age of Onset)    Heart attack Mother, Father    Heart disease Mother, Father, Paternal Uncle    Hypertension Brother          Social History Main Topics    Smoking status: Never Smoker    Smokeless tobacco: Never Used    Alcohol use No    Drug use: No    Sexual activity: Yes     Partners: Female       Review of Systems   All other systems reviewed and are negative.  -fever, -pain    Objective:           There is no height or weight on file to calculate BMI.            Lines/Drains/Airways     Drain                 Ureteral Drain/Stent 01/20/17 1223 Left ureter 6.14 Fr. 13 days                Physical Exam   Constitutional: He is oriented to person, place, and time. He appears well-developed and  well-nourished. No distress.   HENT:   Head: Normocephalic and atraumatic.   Eyes: No scleral icterus.   Neck: No tracheal deviation present.   Pulmonary/Chest: Effort normal. No respiratory distress.   Neurological: He is alert and oriented to person, place, and time.   Psychiatric: He has a normal mood and affect. His behavior is normal. Judgment and thought content normal.       Significant Labs:  BMP:  No results for input(s): NA, K, CL, CO2, BUN, CREATININE, LABGLOM, GLUCOSE, CALCIUM in the last 168 hours.    CBC:  No results for input(s): WBC, HGB, HCT, PLT in the last 168 hours.    Urine Culture: No results for input(s): LABURIN in the last 168 hours.  Urine Studies: No results for input(s): COLORU, APPEARANCEUA, PHUR, SPECGRAV, PROTEINUA, GLUCUA, KETONESU, BILIRUBINUA, OCCULTUA, NITRITE, UROBILINOGEN, LEUKOCYTESUR, RBCUA, WBCUA, BACTERIA, SQUAMEPITHEL, HYALINECASTS in the last 168 hours.    Invalid input(s): WRIGHTSUR    Significant Imaging:  CT: I have reviewed all results within the past 24 hours and my personal findings are:  left pelvic kidney with signficant stone burden.    Assessment and Plan:     There are no hospital problems to display for this patient.  -npo  -abx on call to OR  -to OR for left ureteroscopy.  -I have explained the indication, risks, benefits, and alternatives of the procedure in detail.  Risks including but not limited to bleeding, infection, injury to the urethra, bladder, ureter, need for additional treatments, inability or incomplete removal of kidney stones, pain, and discomfort related to the stent were discussed in depth with the patient.  The patient voices understanding and all questions have been answered.  The patient agrees to proceed as planned with left ureteroscopy, laser lithotripsy, and ureteral stent placement.      VTE Risk Mitigation     None          Jose F Mclaughlin MD  Urology  [facility]

## 2017-02-03 NOTE — OP NOTE
[facility]  Operative Note    Date: 02/03/2017    Pre-Op Diagnosis: left renal stone    Post-Op Diagnosis: same    Procedure(s) Performed:   1.  Left flexible ureteroscopy  2.  Cystoscopy  3.  Left stent exchange  4.  Left retrograde pyelogram  5.  Laser lithotripsy  6.  Stone basket extraction  7.  Fluoro < 1 h    Specimen(s): none    Staff Surgeon: Jose F Mclaughlin MD    Assistant Surgeon: Marion Tenorio MD; Aretha Mcmanus MD    Anesthesia: General endotracheal anesthesia    Indications: Sherif Knutson Jr. is a 66 y.o. male with a left renal stone, presenting for definitive stone management.  He currently does have a JJ ureteral stent in place.      Findings:   1. Normal appearing urethra  2. Stent from left ureteral orifice without encrustation  3. Tortuous and edematous left ureteral orifice  4. Left pelvic kidney, ureter crosses midline  5. Few stone found in the ureter and renal pelvis, lasered and removed via access sheath.     2 baskets were used throughout the case.      Estimated Blood Loss: min    Drains: 6 Fr x 14 cm JJ ureteral stent without strings    Procedure in detail:  After informed consent was obtained, the patient was brought the the cystoscopy suite and placed in the supine position.  SCDs were applied and working.  Anesthesia was administered.  The patient was then placed in the dorsal lithotomy position and prepped and draped in the usual sterile fashion.      A rigid cystoscope in a 22 Fr sheath was introduced into the patient's urethra.  This passed easily.  The entire urethra was visualized which showed no strictures or masses.  Formal cystoscopy was performed which revealed no masses or lesions suspicious for malignancy, no bladder stones, no bladder diverticuli, no trabeculations.  The left ureteral orifice was visualized and there was a JJ stent in place.     A glide wire was passed up the left ureteral orifice and up into the kidney.  This passed easily and placement  was confirmed using fluoro. The kidney was located in the pelvis. The ureter was tortuous. The cystoscope was removed keeping the guidewire in place. We then reinserted the scope and removed the stent with a stent grasper, it was removed in its entirety. We then placed a motion wire alongside the glide wire.     We then passed an access sheath over the motion wire. The dilator and motion wire were removed. We inserted the flexible ureteroscope. Stone fragments were encountered in the proximal ureter. These were able to be removed with the stone basket, multiple passes were made. The ureter was highly tortuous, edematous and difficult to navigate.     Once the ureter was cleared of stone we advanced the access sheath further up to the UPJ. There were 3 large pieces of stone in the renal pelvis. These were lasered with the holmium laser. The fragments were again removed with the stone basket. Multiple passes were required. We then performed formal pyeloscopy to ensure all fragments were removed.     We then removed the access sheath and ureteroscope.     We then back loaded the glide wire into the cystoscope. The wire was exchanged for a 5 fr open ended catheter. A retrograde pyelogram was performed which showed no definite extravasation. The motion wire was replaced through the 5 fr. A 6 Fr x 14 cm JJ ureteral stent without strings was passed over the wire and up into the renal pelvis in standard fashion. Good coils were seen in the kidney and the bladder using fluoro.      The patient tolerated the procedure well and was transferred to the recovery room in stable condition.      Disposition:  The patient will follow up with Dr. Mclaughlin in 1 week.      Marion Tenorio MD

## 2017-02-03 NOTE — DISCHARGE SUMMARY
[facility]  Discharge Note  Short Stay    Admit Date: 2/3/2017    Discharge Date and Time: 2/3/2017    Attending Physician: Jose F Mclaughlin MD     Discharge Provider: Marion Tenorio    Diagnoses:  Active Hospital Problems    Diagnosis  POA    *Urolithiasis [N20.9]  Yes      Resolved Hospital Problems    Diagnosis Date Resolved POA   No resolved problems to display.       Discharged Condition: good    Hospital Course: Patient was admitted for left ureteroscopy and tolerated the procedure well with no complications.     Final Diagnoses: Same as principal problem.    Disposition: Home or Self Care    Follow up/Patient Instructions:    Medications:  Reconciled Home Medications:   Current Discharge Medication List      START taking these medications    Details   oxycodone-acetaminophen (PERCOCET) 5-325 mg per tablet Take 1 tablet by mouth every 4 (four) hours as needed for Pain.  Qty: 31 tablet, Refills: 0      polyethylene glycol (GLYCOLAX) 17 gram PwPk Take 17 g by mouth once daily.  Qty: 30 packet, Refills: 0      !! tamsulosin (FLOMAX) 0.4 mg Cp24 Take 1 capsule (0.4 mg total) by mouth once daily.  Qty: 30 capsule, Refills: 0       !! - Potential duplicate medications found. Please discuss with provider.      CONTINUE these medications which have NOT CHANGED    Details   aspirin (ECOTRIN) 81 MG EC tablet Take 81 mg by mouth once daily.      liraglutide (VICTOZA 2-ANDRE) 0.6 mg/0.1 mL (18 mg/3 mL) PnIj Inject into the skin once daily.      LYRICA 100 mg capsule 2 (two) times daily.       metoprolol succinate (TOPROL-XL) 25 MG 24 hr tablet Take 1 tablet (25 mg total) by mouth once daily.  Qty: 30 tablet, Refills: 5      midodrine (PROAMATINE) 2.5 MG Tab Take 1 tablet (2.5 mg total) by mouth 3 (three) times daily.  Qty: 90 tablet, Refills: 11    Associated Diagnoses: Orthostatic hypotension      NOVOLOG FLEXPEN 100 unit/mL InPn pen as needed.       rosuvastatin (CRESTOR) 40 MG Tab Take 1 tablet (40 mg  "total) by mouth every evening.  Qty: 30 tablet, Refills: 6    Associated Diagnoses: CAD (coronary artery disease)      sub-q insulin device, 20 unit Lashay by Misc.(Non-Drug; Combo Route) route.      !! tamsulosin (FLOMAX) 0.4 mg Cp24 Take 1 capsule (0.4 mg total) by mouth every evening.  Qty: 30 capsule, Refills: 0      BD ULTRA-FINE JERMAINE PEN NEEDLES 32 gauge x 5/32" Ndle       CYANOCOBALAMIN, VITAMIN B-12, (VITAMIN B-12 ORAL) Take by mouth.      EASY TOUCH 31 X 5/16 " Ndle       oxybutynin (DITROPAN) 5 MG Tab Take 1 tablet (5 mg total) by mouth 3 (three) times daily.  Qty: 30 tablet, Refills: 0      oxycodone-acetaminophen (PERCOCET)  mg per tablet Take 1 tablet by mouth every 6 (six) hours as needed for Pain.  Qty: 40 tablet, Refills: 0      SURE COMFORT INSULIN SYRINGE 1/2 mL 31 x 5/16" Syrg        !! - Potential duplicate medications found. Please discuss with provider.          Discharge Procedure Orders  Diet general     Activity as tolerated     Call MD for:  temperature >100.4     Call MD for:  persistent nausea and vomiting     Call MD for:  severe uncontrolled pain     No dressing needed       Follow-up Information     Follow up with Jose F Mclaughlin MD In 2 weeks.    Specialty:  Urology    Why:  post op URS    Contact information:    [address]  [address]  [place] [address] [phone]              Marion Tenorio MD  Urology, PGY-2  Pager# [phone]    "

## 2017-02-03 NOTE — TRANSFER OF CARE
Anesthesia Transfer of Care Note    Patient: Sherif Knutson Jr.    Procedure(s) Performed: Procedure(s) (LRB):  URETEROSCOPY (Left)  LITHOTRIPSY-LASER (N/A)  MANIPULATION-STONE (Left)  PYELOGRAM-RETROGRADE (Left)  PLACEMENT-STENT URETERAL (Left)    Patient location: PACU    Anesthesia Type: general    Transport from OR: Transported from OR on 100% O2 by closed face mask with adequate spontaneous ventilation    Post pain: adequate analgesia    Post assessment: no apparent anesthetic complications and tolerated procedure well    Post vital signs: stable    Level of consciousness: awake and alert    Nausea/Vomiting: no nausea/vomiting    Complications: none          Last vitals:   Visit Vitals    BP (!) 146/75 (BP Location: Left arm, Patient Position: Lying, BP Method: Automatic)    Pulse 85    Temp 36.7 °C (98.1 °F) (Skin)    Resp 18    Ht 6' (1.829 m)    Wt 94.3 kg (208 lb)    SpO2 98%    BMI 28.21 kg/m2

## 2017-02-03 NOTE — PROGRESS NOTES
Dr. Lamas with anesthesia paged to notify of patient's home insulin delivery device currently in use and on patient's right lower side/abdomen area. Awaiting return call.

## 2017-02-03 NOTE — IP AVS SNAPSHOT
[facility]  [address]  [facility] [address]  Phone: [phone]           Patient Discharge Instructions     Our goal is to set you up for success. This packet includes information on your condition, medications, and your home care. It will help you to care for yourself so you don't get sicker and need to go back to the hospital.     Please ask your nurse if you have any questions.        There are many details to remember when preparing to leave the hospital. Here is what you will need to do:    1. Take your medicine. If you are prescribed medications, review your Medication List in the following pages. You may have new medications to  at the pharmacy and others that you'll need to stop taking. Review the instructions for how and when to take your medications. Talk with your doctor or nurses if you are unsure of what to do.     2. Go to your follow-up appointments. Specific follow-up information is listed in the following pages. Your may be contacted by a transition nurse or clinical provider about future appointments. Be sure we have all of the phone numbers to reach you, if needed. Please contact your provider's office if you are unable to make an appointment.     3. Watch for warning signs. Your doctor or nurse will give you detailed warning signs to watch for and when to call for assistance. These instructions may also include educational information about your condition. If you experience any of warning signs to your health, call your doctor.               [facility]  Unless otherwise directed by your provider, please contact [facility], our nurse care line that is available for 24/7 assistance.     [phone] (toll-free)    Registered nurses in the [facility] provide clinical advisement, health education, appointment booking, and other advisory services.                    ** Verify the list of medication(s) below is accurate and up  to date. Carry this with you in case of emergency. If your medications have changed, please notify your healthcare provider.             Medication List      START taking these medications        Additional Info                      polyethylene glycol 17 gram Pwpk   Commonly known as:  GLYCOLAX   Quantity:  30 packet   Refills:  0   Dose:  17 g    Instructions:  Take 17 g by mouth once daily.     Begin Date    AM    Noon    PM    Bedtime         CHANGE how you take these medications        Additional Info                      * oxycodone-acetaminophen  mg per tablet   Commonly known as:  PERCOCET   Quantity:  40 tablet   Refills:  0   Dose:  1 tablet   What changed:  Another medication with the same name was added. Make sure you understand how and when to take each.    Instructions:  Take 1 tablet by mouth every 6 (six) hours as needed for Pain.     Begin Date    AM    Noon    PM    Bedtime       * oxycodone-acetaminophen 5-325 mg per tablet   Commonly known as:  PERCOCET   Quantity:  31 tablet   Refills:  0   Dose:  1 tablet   What changed:  You were already taking a medication with the same name, and this prescription was added. Make sure you understand how and when to take each.    Instructions:  Take 1 tablet by mouth every 4 (four) hours as needed for Pain.     Begin Date    AM    Noon    PM    Bedtime       * tamsulosin 0.4 mg Cp24   Commonly known as:  FLOMAX   Quantity:  30 capsule   Refills:  0   Dose:  0.4 mg   What changed:  Another medication with the same name was added. Make sure you understand how and when to take each.    Instructions:  Take 1 capsule (0.4 mg total) by mouth every evening.     Begin Date    AM    Noon    PM    Bedtime       * tamsulosin 0.4 mg Cp24   Commonly known as:  FLOMAX   Quantity:  30 capsule   Refills:  0   Dose:  0.4 mg   What changed:  You were already taking a medication with the same name, and this prescription was added. Make sure you understand how and when to  "take each.    Instructions:  Take 1 capsule (0.4 mg total) by mouth once daily.     Begin Date    AM    Noon    PM    Bedtime       * Notice:  This list has 4 medication(s) that are the same as other medications prescribed for you. Read the directions carefully, and ask your doctor or other care provider to review them with you.      CONTINUE taking these medications        Additional Info                      aspirin 81 MG EC tablet   Commonly known as:  ECOTRIN   Refills:  0   Dose:  81 mg    Instructions:  Take 81 mg by mouth once daily.     Begin Date    AM    Noon    PM    Bedtime       EASY TOUCH 31 gauge x 5/16" Ndle   Refills:  0   Generic drug:  pen needle, diabetic      Begin Date    AM    Noon    PM    Bedtime       BD ULTRA-FINE JERMAINE PEN NEEDLES 32 gauge x 5/32" Ndle   Refills:  0   Generic drug:  pen needle, diabetic      Begin Date    AM    Noon    PM    Bedtime       LYRICA 100 MG capsule   Refills:  0   Generic drug:  pregabalin    Instructions:  2 (two) times daily.     Begin Date    AM    Noon    PM    Bedtime       metoprolol succinate 25 MG 24 hr tablet   Commonly known as:  TOPROL-XL   Quantity:  30 tablet   Refills:  5    Instructions:  Take 1 tablet (25 mg total) by mouth once daily.     Begin Date    AM    Noon    PM    Bedtime       midodrine 2.5 MG Tab   Commonly known as:  PROAMATINE   Quantity:  90 tablet   Refills:  11   Dose:  2.5 mg    Instructions:  Take 1 tablet (2.5 mg total) by mouth 3 (three) times daily.     Begin Date    AM    Noon    PM    Bedtime       NOVOLOG FLEXPEN 100 unit/mL Inpn pen   Refills:  0   Generic drug:  insulin aspart    Instructions:  as needed.     Begin Date    AM    Noon    PM    Bedtime       oxybutynin 5 MG Tab   Commonly known as:  DITROPAN   Quantity:  30 tablet   Refills:  0   Dose:  5 mg    Instructions:  Take 1 tablet (5 mg total) by mouth 3 (three) times daily.     Begin Date    AM    Noon    PM    Bedtime       rosuvastatin 40 MG Tab   Commonly " known as:  CRESTOR   Quantity:  30 tablet   Refills:  6   Dose:  40 mg    Instructions:  Take 1 tablet (40 mg total) by mouth every evening.     Begin Date    AM    Noon    PM    Bedtime       sub-q insulin device, 20 unit Lashay   Refills:  0    Instructions:  by Misc.(Non-Drug; Combo Route) route.     Begin Date    AM    Noon    PM    Bedtime       SURE COMFORT INSULIN SYRINGE 0.5 mL 31 gauge x 5/16 Syrg   Refills:  0   Generic drug:  insulin syringe-needle U-100      Begin Date    AM    Noon    PM    Bedtime       VICTOZA 2-ANDRE 0.6 mg/0.1 mL (18 mg/3 mL) Pnij   Refills:  0   Generic drug:  liraglutide 0.6 mg/0.1 mL (18 mg/3 mL) subq PNIJ    Instructions:  Inject into the skin once daily.     Begin Date    AM    Noon    PM    Bedtime       VITAMIN B-12 ORAL   Refills:  0    Instructions:  Take by mouth.     Begin Date    AM    Noon    PM    Bedtime            Where to Get Your Medications      You can get these medications from any pharmacy     Bring a paper prescription for each of these medications     oxycodone-acetaminophen 5-325 mg per tablet    polyethylene glycol 17 gram Pwpk    tamsulosin 0.4 mg Cp24                  Please bring to all follow up appointments:    1. A copy of your discharge instructions.  2. All medicines you are currently taking in their original bottles.  3. Identification and insurance card.    Please arrive 15 minutes ahead of scheduled appointment time.    Please call 24 hours in advance if you must reschedule your appointment and/or time.        Follow-up Information     Follow up with Jose F Mclaughlin MD In 1 week.    Specialty:  Urology    Why:  post op URS    Contact information:    [address]  [address]  [place] [address] [phone]          Discharge Instructions     Future Orders    Activity as tolerated     Call MD for:  persistent nausea and vomiting     Call MD for:  severe uncontrolled pain     Call MD for:  temperature >100.4     Diet general     Questions:    Total  calories:      Fat restriction, if any:      Protein restriction, if any:      Na restriction, if any:      Fluid restriction:      Additional restrictions:      No dressing needed         Discharge Instructions       Home Care Instructions    ACTIVITY LEVEL:  If you received sedation and/or an anesthetic, you may feel sleepy for several hours. Rest until you feel more  awake. Gradually resume your normal activities.  DIET:  At home, continue with liquids. If there is no nausea, you may eat a soft diet and gradually resume your normal  diet. Drink a lot of liquids until you see your doctor.  DISCOMFORT:  You may experience a small amount of discomfort, burning on urination, and/or slight bleeding into the urine.  Sitting in a warm tub of water will relieve many of these symptoms. If needed, you may take Tylenol (normal  recommended dose) every 4 hours, for discomfort.  MEDICATIONS:  You will receive instructions for any pain and/or antibiotic prescriptions. Pain medication should be taken only  if needed, and as directed. Antibiotics should be taken as directed until the entire prescription is completed.  ADDITIONAL INFORMATION:  ________________________________________________________________________________________  WHEN TO CALL THE DOCTOR:   If you are unable to urinate within six hours after surgery.   Fever over 101°F (38.4°C).  RETURN APPOINTMENT:  _________________________________________________________________________________________  FOR EMERGENCIES:  If any unusual problems or difficulties occur, contact  ____________________ or the resident at [phone] (page ) or at the Clinic office, [phone].            Primary Diagnosis     Your primary diagnosis was:  Urinary Tract Stone      Admission Information     Date & Time Provider Department CSN    2/3/2017  8:12 AM Jose F Mclaughlin MD [facility] [id number]      Care Providers     Provider Role Specialty Primary  office phone    Jose F Mclaughlin MD Attending Provider Urology [phone]    Jose F Mclaughlin MD Surgeon  Urology [phone]      Your Vitals Were     BP Pulse Temp Resp Height Weight    144/78 (BP Location: Left arm, Patient Position: Lying, BP Method: Automatic) 74 98.1 °F (36.7 °C) (Skin) 18 6' (1.829 m) 94.3 kg (208 lb)    SpO2 BMI             98% 28.21 kg/m2         Recent Lab Values        6/29/2014 6/29/2014                       12:18 PM 11:14 PM          A1C 7.8 (H) 7.7 (H)                     Allergies as of 2/3/2017        Reactions    Sulfa (Sulfonamide Antibiotics) Photosensitivity    Zocor [Simvastatin] Other (See Comments)    lathargic      Advance Directives     An advance directive is a document which, in the event you are no longer able to make decisions for yourself, tells your healthcare team what kind of treatment you do or do not want to receive, or who you would like to make those decisions for you.  If you do not currently have an advance directive, [facility] encourages you to create one.  For more information call:  [phone]), [phone] ([phone]),  or log on to [email or web address].        Language Assistance Services     ATTENTION: Language assistance services are available, free of charge. Please call [phone].      ATENCIÓN: Si habla español, tiene a pantoja disposición servicios gratuitos de asistencia lingüística. Llame al [phone].     CHÚ Ý: N?u b?n nói Ti?ng Vi?t, có các d?ch v? h? tr? ngôn ng? mi?n phí dành cho b?n. G?i s? [phone].        Diabetes Discharge Instructions                                    [facility] complies with applicable Federal civil rights laws and does not discriminate on the basis of race, color, national origin, age, disability, or sex.

## 2017-02-03 NOTE — DISCHARGE INSTRUCTIONS
Home Care Instructions    ACTIVITY LEVEL:  If you received sedation and/or an anesthetic, you may feel sleepy for several hours. Rest until you feel more  awake. Gradually resume your normal activities.  DIET:  At home, continue with liquids. If there is no nausea, you may eat a soft diet and gradually resume your normal  diet. Drink a lot of liquids until you see your doctor.  DISCOMFORT:  You may experience a small amount of discomfort, burning on urination, and/or slight bleeding into the urine.  Sitting in a warm tub of water will relieve many of these symptoms. If needed, you may take Tylenol (normal  recommended dose) every 4 hours, for discomfort.  MEDICATIONS:  You will receive instructions for any pain and/or antibiotic prescriptions. Pain medication should be taken only  if needed, and as directed. Antibiotics should be taken as directed until the entire prescription is completed.  ADDITIONAL INFORMATION:  ________________________________________________________________________________________  WHEN TO CALL THE DOCTOR:   If you are unable to urinate within six hours after surgery.   Fever over 101°F (38.4°C).  RETURN APPOINTMENT:  _________________________________________________________________________________________  FOR EMERGENCIES:  If any unusual problems or difficulties occur, contact  ____________________ or the resident at [phone] (page ) or at the Clinic office, [phone].

## 2017-02-03 NOTE — PROGRESS NOTES
Dr. aLmas at bedside, notifed of patient's continuous insulin delivery device, also notified that patient's blood glucose this morning was 160. MD stated it was okay to leave insulin device intact to right side and remain running during patient's surgery today.

## 2017-02-03 NOTE — ANESTHESIA POSTPROCEDURE EVALUATION
Anesthesia Post Evaluation    Patient: Sherif Knutson Jr.    Procedure(s) Performed: Procedure(s) (LRB):  URETEROSCOPY (Left)  LITHOTRIPSY-LASER (N/A)  MANIPULATION-STONE (Left)  PYELOGRAM-RETROGRADE (Left)  PLACEMENT-STENT URETERAL (Left)    Final Anesthesia Type: general  Patient location during evaluation: PACU  Patient participation: Yes- Able to Participate  Level of consciousness: awake and alert and oriented  Post-procedure vital signs: reviewed and stable  Pain management: adequate  Airway patency: patent  PONV status at discharge: No PONV  Anesthetic complications: no      Cardiovascular status: hemodynamically stable  Respiratory status: unassisted, spontaneous ventilation and room air  Hydration status: euvolemic  Follow-up not needed.        Visit Vitals    /81 (BP Location: Left arm, Patient Position: Sitting, BP Method: Automatic)    Pulse 65    Temp 36.9 °C (98.4 °F) (Skin)    Resp 18    Ht 6' (1.829 m)    Wt 94.3 kg (208 lb)    SpO2 100%    BMI 28.21 kg/m2       Pain/Crystal Score: Pain Assessment Performed: Yes (2/3/2017  2:30 PM)  Presence of Pain: complains of pain/discomfort (2/3/2017  2:30 PM)  Pain Rating Prior to Med Admin: 6 (2/3/2017  1:09 PM)  Crystal Score: 10 (2/3/2017  2:30 PM)

## 2017-02-06 ENCOUNTER — TELEPHONE (OUTPATIENT)
Dept: UROLOGY | Facility: CLINIC | Age: 67
End: 2017-02-06

## 2017-02-06 DIAGNOSIS — N20.0 KIDNEY STONE: Primary | ICD-10-CM

## 2017-02-06 NOTE — TELEPHONE ENCOUNTER
----- Message from Queta Fernandez sent at 2/3/2017  1:02 PM CST -----  Contact: [facility] Case Management   Requests a call to patient to schedule his post-op appt within 1 week. Please advise.

## 2017-02-08 LAB — URINARY STONE ANALYSIS: NORMAL

## 2017-02-09 ENCOUNTER — PROCEDURE VISIT (OUTPATIENT)
Dept: UROLOGY | Facility: CLINIC | Age: 67
End: 2017-02-09
Payer: MEDICARE

## 2017-02-09 VITALS — DIASTOLIC BLOOD PRESSURE: 72 MMHG | HEART RATE: 72 BPM | SYSTOLIC BLOOD PRESSURE: 135 MMHG

## 2017-02-09 DIAGNOSIS — N20.0 KIDNEY STONE: ICD-10-CM

## 2017-02-09 PROCEDURE — 52310 CYSTOSCOPY AND TREATMENT: CPT | Mod: S$GLB,,, | Performed by: UROLOGY

## 2017-02-09 NOTE — MR AVS SNAPSHOT
"    [place] [facility]  [address] [place] [address] Ave  [place] [address]  Phone: [phone]                  Sherif Kuntson Jr.   2017 9:45 AM   Procedure visit    Description:  Male : 1950   Provider:  Jose F Mclaughlin MD   Department:  [place] [facility]           Reason for Visit     Other           Diagnoses this Visit        Comments    Kidney stone                To Do List           Future Appointments        Provider Department Dept Phone    3/9/2017 12:30 PM [facility] CT2 LIMIT 400 LBS [facility] [phone]      Goals (5 Years of Data)     None      Follow-Up and Disposition     Return in about 4 weeks (around 3/9/2017) for review imaging.      [facility]     [facility] Nurse Care Line -  Assistance  Registered nurses in the [facility] provide clinical advisement, health education, appointment booking, and other advisory services.  Call for this free service at [phone].             Medications                Verify that the below list of medications is an accurate representation of the medications you are currently taking.  If none reported, the list may be blank. If incorrect, please contact your healthcare provider. Carry this list with you in case of emergency.           Current Medications     aspirin (ECOTRIN) 81 MG EC tablet Take 81 mg by mouth once daily.    BD ULTRA-FINE JERMAINE PEN NEEDLES 32 gauge x 5/32" Ndle     CYANOCOBALAMIN, VITAMIN B-12, (VITAMIN B-12 ORAL) Take by mouth.    EASY TOUCH 31 X 5/16 " Ndle     liraglutide (VICTOZA 2-ANDRE) 0.6 mg/0.1 mL (18 mg/3 mL) PnIj Inject into the skin once daily.    LYRICA 100 mg capsule 2 (two) times daily.     metoprolol succinate (TOPROL-XL) 25 MG 24 hr tablet Take 1 tablet (25 mg total) by mouth once daily.    midodrine (PROAMATINE) 2.5 MG Tab Take 1 tablet (2.5 mg total) by mouth 3 (three) times daily.    NOVOLOG FLEXPEN 100 unit/mL InPn pen as needed.     oxybutynin (DITROPAN) 5 MG Tab Take 1 tablet " "(5 mg total) by mouth 3 (three) times daily.    oxycodone-acetaminophen (PERCOCET)  mg per tablet Take 1 tablet by mouth every 6 (six) hours as needed for Pain.    oxycodone-acetaminophen (PERCOCET) 5-325 mg per tablet Take 1 tablet by mouth every 4 (four) hours as needed for Pain.    polyethylene glycol (GLYCOLAX) 17 gram PwPk Take 17 g by mouth once daily.    rosuvastatin (CRESTOR) 40 MG Tab Take 1 tablet (40 mg total) by mouth every evening.    sub-q insulin device, 20 unit Lashay by Misc.(Non-Drug; Combo Route) route.    SURE COMFORT INSULIN SYRINGE 1/2 mL 31 x 5/16" Syrg     tamsulosin (FLOMAX) 0.4 mg Cp24 Take 1 capsule (0.4 mg total) by mouth every evening.    tamsulosin (FLOMAX) 0.4 mg Cp24 Take 1 capsule (0.4 mg total) by mouth once daily.           Clinical Reference Information           Your Vitals Were     BP Pulse                135/72 72          Blood Pressure          Most Recent Value    BP  135/72      Allergies as of 2/9/2017     Sulfa (Sulfonamide Antibiotics)    Zocor [Simvastatin]      Immunizations Administered on Date of Encounter - 2/9/2017     None      Orders Placed During Today's Visit      Normal Orders This Visit    Cystoscopy     Future Labs/Procedures Expected by Expires    CT Renal Stone Study ABD Pelvis WO  2/9/2017 2/9/2018      Language Assistance Services     ATTENTION: Language assistance services are available, free of charge. Please call [phone].      ATENCIÓN: Si habla español, tiene a pantoja disposición servicios gratuitos de asistencia lingüística. Llame al [phone].     [facility] Ý: N?u b?n nói Ti?ng Vi?t, có các d?ch v? h? tr? ngôn ng? mi?n phí dành cho b?n. G?i s? [phone].         [facility] complies with applicable Federal civil rights laws and does not discriminate on the basis of race, color, national origin, age, disability, or sex.        "

## 2017-02-09 NOTE — PROCEDURES
Procedures   Procedure: Flexible cysto-uretheroscopy and stent removal   Pre Procedure Diagnosis:s/p ureteroscopy and stent placement  Post Procedure Diagnosis:same   Surgeon: Jose F Mclaughlin MD   Anesthesia: 2% uro-jet lidocaine jelly for local analgesia   Flexible cysto-urethroscopy was performed after consent was obtained. The risks and benefits were explained.   2% lidocaine urojet was used for local analgesia.   The genitalia was prepped and draped in the sterile fashion with betadine.   The flexible scope was advanced into the urethra and into the bladder. The stent was removed without difficulty.   The patient tolerated the procedure well without complication.   They will follow up in 4 weeks with a renal stone protocol CT scan.

## 2017-03-09 ENCOUNTER — OFFICE VISIT (OUTPATIENT)
Dept: UROLOGY | Facility: CLINIC | Age: 67
End: 2017-03-09
Payer: MEDICARE

## 2017-03-09 ENCOUNTER — HOSPITAL ENCOUNTER (OUTPATIENT)
Dept: RADIOLOGY | Facility: HOSPITAL | Age: 67
Discharge: HOME | End: 2017-03-09
Attending: UROLOGY
Payer: MEDICARE

## 2017-03-09 VITALS
HEIGHT: 72 IN | DIASTOLIC BLOOD PRESSURE: 66 MMHG | SYSTOLIC BLOOD PRESSURE: 132 MMHG | WEIGHT: 208 LBS | HEART RATE: 69 BPM | BODY MASS INDEX: 28.17 KG/M2

## 2017-03-09 DIAGNOSIS — N20.0 KIDNEY STONE: ICD-10-CM

## 2017-03-09 DIAGNOSIS — N20.0 NEPHROLITHIASIS: Primary | ICD-10-CM

## 2017-03-09 PROCEDURE — 74176 CT ABD & PELVIS W/O CONTRAST: CPT | Mod: TC

## 2017-03-09 PROCEDURE — 1160F RVW MEDS BY RX/DR IN RCRD: CPT | Mod: S$GLB,,, | Performed by: UROLOGY

## 2017-03-09 PROCEDURE — 99214 OFFICE O/P EST MOD 30 MIN: CPT | Mod: S$GLB,,, | Performed by: UROLOGY

## 2017-03-09 PROCEDURE — 74176 CT ABD & PELVIS W/O CONTRAST: CPT | Mod: 26,,, | Performed by: RADIOLOGY

## 2017-03-09 PROCEDURE — 99999 PR PBB SHADOW E&M-EST. PATIENT-LVL III: CPT | Mod: PBBFAC,,, | Performed by: UROLOGY

## 2017-03-09 PROCEDURE — 3075F SYST BP GE 130 - 139MM HG: CPT | Mod: S$GLB,,, | Performed by: UROLOGY

## 2017-03-09 PROCEDURE — 3078F DIAST BP <80 MM HG: CPT | Mod: S$GLB,,, | Performed by: UROLOGY

## 2017-03-09 RX ORDER — INSULIN ASPART 100 [IU]/ML
INJECTION, SOLUTION INTRAVENOUS; SUBCUTANEOUS
COMMUNITY
Start: 2017-02-24 | End: 2020-06-30

## 2017-03-09 NOTE — MR AVS SNAPSHOT
"    [facility]  [address] [place] [address] Ave  [place] [address]  Phone: [phone]                  Sherif Knutson Jr.   3/9/2017 3:00 PM   Office Visit    Description:  Male : 1950   Provider:  Jose F Mclaughlin MD   Department:  [place] [facility]           Reason for Visit     Results           Diagnoses this Visit        Comments    Nephrolithiasis    -  Primary            To Do List           Future Appointments        Provider Department Dept Phone    2017 8:50 AM APPOINTMENT LAB, [facility] [phone]      Goals (5 Years of Data)     None      Follow-Up and Disposition     Return in about 3 months (around 2017).      [facility]     [facility] Nurse Care [facility] -  Assistance  Registered nurses in the [facility] provide clinical advisement, health education, appointment booking, and other advisory services.  Call for this free service at [phone].             Medications           STOP taking these medications     tamsulosin (FLOMAX) 0.4 mg Cp24 Take 1 capsule (0.4 mg total) by mouth once daily.    oxycodone-acetaminophen (PERCOCET) 5-325 mg per tablet Take 1 tablet by mouth every 4 (four) hours as needed for Pain.    liraglutide (VICTOZA 2-ANDRE) 0.6 mg/0.1 mL (18 mg/3 mL) PnIj Inject into the skin once daily.    tamsulosin (FLOMAX) 0.4 mg Cp24 Take 1 capsule (0.4 mg total) by mouth every evening.           Verify that the below list of medications is an accurate representation of the medications you are currently taking.  If none reported, the list may be blank. If incorrect, please contact your healthcare provider. Carry this list with you in case of emergency.           Current Medications     aspirin (ECOTRIN) 81 MG EC tablet Take 81 mg by mouth once daily.    BD ULTRA-FINE JERMAINE PEN NEEDLES 32 gauge x 5/32" Ndle     CYANOCOBALAMIN, VITAMIN B-12, (VITAMIN B-12 ORAL) Take by mouth.    EASY TOUCH 31 X 5/16 " Ndle     LYRICA 100 mg " "capsule 2 (two) times daily.     metoprolol succinate (TOPROL-XL) 25 MG 24 hr tablet Take 1 tablet (25 mg total) by mouth once daily.    midodrine (PROAMATINE) 2.5 MG Tab Take 1 tablet (2.5 mg total) by mouth 3 (three) times daily.    NOVOLOG 100 unit/mL injection     NOVOLOG FLEXPEN 100 unit/mL InPn pen as needed.     oxybutynin (DITROPAN) 5 MG Tab Take 1 tablet (5 mg total) by mouth 3 (three) times daily.    oxycodone-acetaminophen (PERCOCET)  mg per tablet Take 1 tablet by mouth every 6 (six) hours as needed for Pain.    polyethylene glycol (GLYCOLAX) 17 gram PwPk Take 17 g by mouth once daily.    rosuvastatin (CRESTOR) 40 MG Tab Take 1 tablet (40 mg total) by mouth every evening.    sub-q insulin device, 20 unit Lashay by Misc.(Non-Drug; Combo Route) route.    SURE COMFORT INSULIN SYRINGE 1/2 mL 31 x 5/16" Syrg            Clinical Reference Information           Your Vitals Were     BP Pulse Height Weight BMI    132/66 69 6' (1.829 m) 94.3 kg (208 lb) 28.21 kg/m2      Blood Pressure          Most Recent Value    BP  132/66      Allergies as of 3/9/2017     Sulfa (Sulfonamide Antibiotics)    Zocor [Simvastatin]      Immunizations Administered on Date of Encounter - 3/9/2017     None      Orders Placed During Today's Visit     Future Labs/Procedures Expected by Expires    PTH, intact  3/9/2017 3/9/2018    Uric acid  3/9/2017 3/9/2018    Vitamin D  3/9/2017 3/9/2018    US Retroperitoneal Complete (Kidney and  6/9/2017 3/9/2018    UroRisk Diagnostic Profile, 24 Hour  As directed 3/9/2018      Language Assistance Services     ATTENTION: Language assistance services are available, free of charge. Please call [phone].      ATENCIÓN: Si dheeraj pineda, tiene a pantoja disposición servicios gratuitos de asistencia lingüística. Llame al [phone].     CHÚ Ý: N?u b?n nói Ti?ng Vi?t, có các d?ch v? h? tr? ngôn ng? mi?n phí dành cho b?n. G?i s? [phone].         [facility] complies with applicable " Federal civil rights laws and does not discriminate on the basis of race, color, national origin, age, disability, or sex.

## 2017-03-09 NOTE — PROGRESS NOTES
Subjective:      Patient ID: Sherif Knutson Jr. is a 66 y.o. male.    Chief Complaint: Results (ct scan done this morning and schedule sx for the rest of the stones)    Patient is a 66 y.o. male who is established to our clinic and was initially self-referred for evaluation of kidney stones.     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  He had his stent removed on 2/9/17.  He has done well.  He returns today to discuss his post-op imaging (CT scan).    CT scan was independently reviewed today and reveals no residual stones.  Small right non-obstructing renal stone.       Review of Systems   All other systems reviewed and negative except pertinent positives noted in HPI.    Objective:     Physical Exam   Constitutional: He is oriented to person, place, and time. He appears well-developed and well-nourished. No distress.   HENT:   Head: Normocephalic and atraumatic.   Eyes: No scleral icterus.   Neck: No tracheal deviation present.   Pulmonary/Chest: Effort normal. No respiratory distress.   Neurological: He is alert and oriented to person, place, and time.   Psychiatric: He has a normal mood and affect. His behavior is normal. Judgment and thought content normal.     Assessment:     1. Nephrolithiasis      Plan:     1. Kidney stone:  -General risk factors for kidney stones and the conservative measures to prevent kidney stones in the future were discussed with the patient in detail.  The patient was encouraged to drink 2-3 liters of water a day, limit iced tea and junior as well as foods high in oxalate.  They were cautioned to try to limit salt and red meat intake.  We also discussed adding citrate to the diet with the addition of dale or lemon juice to their water or alternatively with crystal light.   -Imaging review: CT scan was independently reviewed today and reveals 1mm ureteral stone, mild hydronephrosis  but no retained renal stones.   -plan for f/u in 3 months with a renal US.     -24 hour urine: ordered--will call with results.  -PTH, uric acid, Vit D today  -Urine dip: pH 5, ++glucose, +blood, +protein  -stone analysis: 100% calcium oxalate

## 2017-03-20 ENCOUNTER — LAB VISIT (OUTPATIENT)
Dept: LAB | Facility: HOSPITAL | Age: 67
End: 2017-03-20
Attending: UROLOGY
Payer: MEDICARE

## 2017-03-20 DIAGNOSIS — N20.0 NEPHROLITHIASIS: ICD-10-CM

## 2017-03-20 PROCEDURE — 83735 ASSAY OF MAGNESIUM: CPT

## 2017-03-29 LAB — STONE ANALYSIS-IMP: NORMAL

## 2017-04-07 DIAGNOSIS — N20.0 NEPHROLITHIASIS: Primary | ICD-10-CM

## 2017-04-07 RX ORDER — POTASSIUM CITRATE 10 MEQ/1
20 TABLET, EXTENDED RELEASE ORAL 2 TIMES DAILY WITH MEALS
Qty: 120 TABLET | Refills: 11 | Status: ON HOLD | OUTPATIENT
Start: 2017-04-07 | End: 2017-07-31

## 2017-07-17 ENCOUNTER — TELEPHONE (OUTPATIENT)
Dept: CARDIOLOGY | Facility: CLINIC | Age: 67
End: 2017-07-17

## 2017-07-17 DIAGNOSIS — R55 SYNCOPE AND COLLAPSE: Primary | ICD-10-CM

## 2017-07-17 NOTE — TELEPHONE ENCOUNTER
----- Message from Constance Torrez MA sent at 7/17/2017  4:23 PM CDT -----  Contact: patient called  Felipa please call the patient at home he needed to talk to you about his condition he pass out last week he blood pressure was low 74/48.  Last visit was on 1- . Thank you.

## 2017-07-17 NOTE — TELEPHONE ENCOUNTER
Pt said that he has been having orthostatic hypotension but last week he said he got up b/c he could see his son coming to his front door and when he got up he said he held on to the bar stool and then he collapsed.When he woke up his son had helped him up.He said he did hit his head and he had bruises but they have healed.He did not notify his PCP about hitting his head.He also reports that he is having a lot of indigestion like he had prior to the stent placement.Pt agreed to come in tomorrow with an EKG before.

## 2017-07-18 ENCOUNTER — OFFICE VISIT (OUTPATIENT)
Dept: CARDIOLOGY | Facility: CLINIC | Age: 67
End: 2017-07-18
Payer: MEDICARE

## 2017-07-18 ENCOUNTER — DOCUMENTATION ONLY (OUTPATIENT)
Dept: CARDIOLOGY | Facility: CLINIC | Age: 67
End: 2017-07-18

## 2017-07-18 ENCOUNTER — HOSPITAL ENCOUNTER (OUTPATIENT)
Dept: CARDIOLOGY | Facility: CLINIC | Age: 67
Discharge: HOME | End: 2017-07-18
Payer: MEDICARE

## 2017-07-18 ENCOUNTER — INITIAL CONSULT (OUTPATIENT)
Dept: CARDIOLOGY | Facility: CLINIC | Age: 67
End: 2017-07-18
Payer: MEDICARE

## 2017-07-18 VITALS
BODY MASS INDEX: 28.82 KG/M2 | HEART RATE: 74 BPM | SYSTOLIC BLOOD PRESSURE: 119 MMHG | WEIGHT: 212.81 LBS | HEIGHT: 72 IN | DIASTOLIC BLOOD PRESSURE: 67 MMHG | OXYGEN SATURATION: 98 %

## 2017-07-18 VITALS
HEART RATE: 78 BPM | SYSTOLIC BLOOD PRESSURE: 155 MMHG | WEIGHT: 212.06 LBS | HEIGHT: 72 IN | BODY MASS INDEX: 28.72 KG/M2 | DIASTOLIC BLOOD PRESSURE: 87 MMHG

## 2017-07-18 DIAGNOSIS — E11.51 TYPE 2 DIABETES MELLITUS WITH DIABETIC PERIPHERAL ANGIOPATHY WITHOUT GANGRENE, WITH LONG-TERM CURRENT USE OF INSULIN: ICD-10-CM

## 2017-07-18 DIAGNOSIS — E78.5 DYSLIPIDEMIA: ICD-10-CM

## 2017-07-18 DIAGNOSIS — I25.718 ATHEROSCLEROSIS OF AUTOLOGOUS VEIN CORONARY ARTERY BYPASS GRAFT WITH STABLE ANGINA PECTORIS: ICD-10-CM

## 2017-07-18 DIAGNOSIS — Z95.1 HX OF CABG: ICD-10-CM

## 2017-07-18 DIAGNOSIS — I20.89 STABLE ANGINA: Primary | ICD-10-CM

## 2017-07-18 DIAGNOSIS — I20.9 ANGINA, CLASS III: Primary | ICD-10-CM

## 2017-07-18 DIAGNOSIS — R55 SYNCOPE AND COLLAPSE: ICD-10-CM

## 2017-07-18 DIAGNOSIS — I25.110 CORONARY ARTERY DISEASE INVOLVING NATIVE CORONARY ARTERY OF NATIVE HEART WITH UNSTABLE ANGINA PECTORIS: Primary | ICD-10-CM

## 2017-07-18 DIAGNOSIS — I77.1 SUBCLAVIAN ARTERIAL STENOSIS: ICD-10-CM

## 2017-07-18 DIAGNOSIS — Z79.4 TYPE 2 DIABETES MELLITUS WITH DIABETIC PERIPHERAL ANGIOPATHY WITHOUT GANGRENE, WITH LONG-TERM CURRENT USE OF INSULIN: ICD-10-CM

## 2017-07-18 PROCEDURE — 93000 ELECTROCARDIOGRAM COMPLETE: CPT | Mod: S$GLB,,, | Performed by: INTERNAL MEDICINE

## 2017-07-18 PROCEDURE — 99999 PR PBB SHADOW E&M-EST. PATIENT-LVL IV: CPT | Mod: PBBFAC,GC,, | Performed by: INTERNAL MEDICINE

## 2017-07-18 PROCEDURE — 99213 OFFICE O/P EST LOW 20 MIN: CPT | Mod: GC,S$GLB,, | Performed by: INTERNAL MEDICINE

## 2017-07-18 PROCEDURE — 99214 OFFICE O/P EST MOD 30 MIN: CPT | Mod: S$GLB,,, | Performed by: INTERNAL MEDICINE

## 2017-07-18 PROCEDURE — 99999 PR PBB SHADOW E&M-EST. PATIENT-LVL IV: CPT | Mod: PBBFAC,,, | Performed by: INTERNAL MEDICINE

## 2017-07-18 RX ORDER — DIPHENHYDRAMINE HCL 25 MG
50 CAPSULE ORAL ONCE
Status: CANCELLED | OUTPATIENT
Start: 2017-07-18 | End: 2017-07-18

## 2017-07-18 RX ORDER — CLOPIDOGREL BISULFATE 75 MG/1
TABLET ORAL
Qty: 30 TABLET | Refills: 11 | Status: SHIPPED | OUTPATIENT
Start: 2017-07-18 | End: 2018-07-18 | Stop reason: SDUPTHER

## 2017-07-18 RX ORDER — SODIUM CHLORIDE 9 MG/ML
3 INJECTION, SOLUTION INTRAVENOUS CONTINUOUS
Status: CANCELLED | OUTPATIENT
Start: 2017-07-18 | End: 2017-07-18

## 2017-07-18 NOTE — LETTER
July 18, 2017      Yassine Dominguez MD  [address]  [facility] [address]           [facility]  [address]  [facility] [address]  Phone: [phone]          Patient: Sherif Knutson Jr.   MR Number: [id number]   YOB: 1950   Date of Visit: 7/18/2017       Dear Dr. Yassine Dominguez:    Thank you for referring Sherif Knutson to me for evaluation. Attached you will find relevant portions of my assessment and plan of care.    If you have questions, please do not hesitate to call me. I look forward to following Sherif Knutson along with you.    Sincerely,    Juancho Black MD    Enclosure  CC:  No Recipients    If you would like to receive this communication electronically, please contact [email or web address] or [phone] to request more information on [employer] access.    For providers and/or their staff who would like to refer a patient to [facility], please contact us through our one-stop-shop provider referral line, [facility], at [phone].    If you feel you have received this communication in error or would no longer like to receive these types of communications, please e-mail [email or web address]

## 2017-07-18 NOTE — PROGRESS NOTES
I have seen, taken a history and examined the patient. I agree with the evaluation and management plan. The frequency and severity of his angina has been increasing recently

## 2017-07-18 NOTE — PROGRESS NOTES
OUTPATIENT CATHETERIZATION INSTRUCTIONS    You have been scheduled for a procedure in the catheterization lab on Monday, July 24, 2017.     Please report to the Cardiology Waiting Area on the Third floor of the hospital and check in at 7 AM.   You will then be taken to the SSCU (Short Stay Cardiac Unit) and prepared for your procedure. Please be aware that this is not the time of your procedure but the time you are to arrive. The procedures are scheduled on an hourly basis; however, emergency cases take precedence over all other cases.       You may not have anything to eat or drink after midnight the night before your test. You may take your regular morning medications with water. If there are any medications that you should not take you will be instructed to hold them that morning. If you are diabetic and on Metformin (Glucophage) do not take it the day before, the day of, and for 2 days after your procedure.      The procedure will take 1-2 hours to perform. After the procedure, you will return to SSCU on the third floor of the hospital. You will need to lie still (or keep your arm still) for the next 4 to 6 hours to minimize bleeding from the puncture site. Your family may remain in the room with you during this time.       You may be able to be discharged home that same afternoon if there is someone to drive you home and there were no complications. If you have one of the balloon, stent, or device procedures you may spend the night in the hospital. Your doctor will determine, based on your progress, the date and time of your discharge. The results of your procedure will be discussed with you before you are discharged. Any further testing or procedures will be scheduled for you either before you leave or you will be called with these appointments.       If you should have any questions, concerns, or need to change the date of your procedure, please call  MJ Lemons @ [phone]    Special Instructions:  Hold  your regularly scheduled insulin dose the morning of your procedure.  Take 1/2 of your regularly scheduled insulin dose the night before your procedure.  Drink plenty of water the day before and after the procedure      THE ABOVE INSTRUCTIONS WERE GIVEN TO THE PATIENT VERBALLY AND THEY VERBALIZED UNDERSTANDING.  THEY DO NOT REQUIRE ANY SPECIAL NEEDS AND DO NOT HAVE ANY LEARNING BARRIERS.          Directions for Reporting to Cardiology Waiting Area in the Hospital  If you park in the Parking Garage:  Take elevators to the1st floor of the parking garage.  Continue past the gift shop, coffee shop, and piano.  Take a right and go to the gold elevators. (Elevator B)  Take the elevator to the 3rd floor.  Follow the arrow on the sign on the wall that says Cath Lab Registration/EP Lab Registration.  Follow the long hallway all the way around until you come to a big open area.  This is the registration area.  Check in at Reception Desk.    OR    If family is dropping you off:  Have them drop you off at the front of the Hospital under the green overhang.  Enter through the doors and take a right.  Take the E elevators to the 3rd floor Cardiology Waiting Area.  Check in at the Reception Desk in the waiting room.

## 2017-07-18 NOTE — PROGRESS NOTES
Subjective:    Patient ID:  Sherif Knutson Jr. is a 66 y.o. male who presents for evaluation of Chest Pain; Shortness of Breath; and Gastroesophageal Reflux    Patient is a 63 y.o. male with a history of known CAD s/p 5-vessel CABG in 2000, PCI to RCA in 2006 and PCI to LCx in may 2013 (at [facility]), left subclavian stenosis s/p stent , hypertension, DLD, diabetes mellitus who presents to  clinic for evaluation of chest pain/indigestion. He reports indigestion symptoms intermittent for the past 4-6 weeks, relieved by sub lingual nitroglycerine. Increased on exertion and relieved with rest and nitroglycerine associated with nausea. He reports that these symptoms were exactly the same prior to his CABG and PCIs in the past. No associated SOB, diaphoresis    He was last seen by Dr Allen in January for pre op clearance for lthotripsy procedure. He had this in March 2017 and was uneventful.     Cardiac PET stress test 7/15/2013:    CONCLUSIONS: ABNORMAL MYOCARDIAL PERFUSION PET STRESS TEST  1. There is a large sized mild ischemia in the base to apical anterior and lateral apical walls. This defect comprises 20 % of the left ventricular myocardium.  The countour of the ischemic defect is consistent with obstructive disease in several   diagonal and septal vessels with probably patentcy of LIMA.  Anatomy not available at the time of interpretation of the study.   2. Resting wall motion is physiologic. Stress wall motion is physiologic.   3. Resting LV function is normal. There is stress induced LV dysfunction with a reduced ejection fraction of 50 %.  (normal is >= 51%)  4. The ventricular volumes are normal at rest and stress.   5. The extracardiac distribution of radioactivity is normal.   6. There was no previous study available to compare.    Last [facility] 6/2014:      SUMMARY:    1. LVEDP=20mmHg  2. Patent left subclavian artery stent  3. Patent LIMA to LAD  4. 50% mid PDA stenosis; FFR=0.85    E.  "RECOMMENDATIONS:    1. Routine post-cath care.  2. Maximize medical secondary prevention of CAD and angina  3. EC ASA 81mg po qday  4. Plavix 75mg po qday  5. Follow-up with primary cardiologist in 1-2 weeks    Past Medical History:   Diagnosis Date    Coronary artery disease 2006, 5/2013    s/p pci to rca (2006), LCx (5/2013)    DM (diabetes mellitus)     HLD (hyperlipidemia)     HTN (hypertension)     Kidney stone     EMELI (obstructive sleep apnea) 11/2/2015    Subclavian artery stenosis, left      Past Surgical History:   Procedure Laterality Date    BACK SURGERY      CHOLECYSTECTOMY  2011    CORONARY ANGIOPLASTY      CORONARY ARTERY BYPASS GRAFT  2000    5 bypass surgeries    TONSILLECTOMY       Current Outpatient Prescriptions on File Prior to Visit   Medication Sig Dispense Refill    aspirin (ECOTRIN) 81 MG EC tablet Take 81 mg by mouth once daily.      BD ULTRA-FINE JERMAINE PEN NEEDLES 32 gauge x 5/32" Ndle       CYANOCOBALAMIN, VITAMIN B-12, (VITAMIN B-12 ORAL) Take by mouth.      EASY TOUCH 31 X 5/16 " Ndle       LYRICA 100 mg capsule 2 (two) times daily.       metoprolol succinate (TOPROL-XL) 25 MG 24 hr tablet Take 1 tablet (25 mg total) by mouth once daily. 30 tablet 5    midodrine (PROAMATINE) 2.5 MG Tab Take 1 tablet (2.5 mg total) by mouth 3 (three) times daily. 90 tablet 11    NOVOLOG 100 unit/mL injection       NOVOLOG FLEXPEN 100 unit/mL InPn pen as needed.       oxycodone-acetaminophen (PERCOCET)  mg per tablet Take 1 tablet by mouth every 6 (six) hours as needed for Pain. 40 tablet 0    rosuvastatin (CRESTOR) 40 MG Tab Take 1 tablet (40 mg total) by mouth every evening. 30 tablet 6    sub-q insulin device, 20 unit [place] by Misc.(Non-Drug; Combo Route) route.      SURE COMFORT INSULIN SYRINGE 1/2 mL 31 x 5/16" Syrg       oxybutynin (DITROPAN) 5 MG Tab Take 1 tablet (5 mg total) by mouth 3 (three) times daily. 30 tablet 0    polyethylene glycol (GLYCOLAX) 17 gram PwPk " Take 17 g by mouth once daily. 30 packet 0    potassium citrate (UROCIT-K) 10 mEq (1,080 mg) TbSR Take 2 tablets (20 mEq total) by mouth 2 (two) times daily with meals. 120 tablet 11     No current facility-administered medications on file prior to visit.      Review of patient's allergies indicates:   Allergen Reactions    Sulfa (sulfonamide antibiotics) Photosensitivity    Zocor [simvastatin] Other (See Comments)     lathargic       Review of Systems   Constitution: Negative for weakness, malaise/fatigue, weight gain and weight loss.   HENT: Negative for headaches.    Eyes: Negative for blurred vision.   Cardiovascular: Negative for chest pain, claudication, cyanosis, dyspnea on exertion, irregular heartbeat, leg swelling, near-syncope, orthopnea, palpitations, paroxysmal nocturnal dyspnea and syncope.   Respiratory: Negative for cough, shortness of breath and wheezing.         EMELI intolerant of CPAP   Endocrine:        DM not well controlled   Musculoskeletal: Negative for falls and myalgias.   Gastrointestinal: Negative for abdominal pain, heartburn, nausea and vomiting.   Genitourinary: Negative for nocturia.   Neurological: Negative for brief paralysis, dizziness, focal weakness, numbness and paresthesias.   Psychiatric/Behavioral: Negative for altered mental status.       BP (!) 155/87   Pulse 78   Ht 6' (1.829 m)   Wt 96.2 kg (212 lb 1.3 oz)   BMI 28.76 kg/m²   Objective:    Physical Exam   Constitutional: He is oriented to person, place, and time. He appears well-developed. No distress.   /75 (BP Location: Left arm, Patient Position: Sitting, BP Method: Automatic)  Pulse 78  Ht 6' (1.829 m)  Wt 98 kg (216 lb 0.8 oz)  BMI 29.3 kg/m2   HENT:   Head: Normocephalic.   Right Ear: External ear normal.   Left Ear: External ear normal.   Eyes: EOM are normal. Pupils are equal, round, and reactive to light. No scleral icterus.   Neck: Neck supple. No JVD present. No thyromegaly present.    Cardiovascular: Normal rate, regular rhythm, normal heart sounds and intact distal pulses.  PMI is not displaced.  Exam reveals no gallop and no friction rub.    No murmur heard.  Pulmonary/Chest: Effort normal and breath sounds normal. No respiratory distress. He has no wheezes. He has no rales.   Median sternotomy scar.   Abdominal: Soft. He exhibits no distension. There is no hepatosplenomegaly. There is no tenderness.   Musculoskeletal: He exhibits no edema or tenderness.   Gait normal   Neurological: He is alert and oriented to person, place, and time.   Skin: Skin is warm and dry. No rash noted.   Psychiatric: He has a normal mood and affect. His behavior is normal.     EKG in clinic: NSR, T wave changes in lateral leads- chronic      Assessment:     1. Coronary artery disease involving native coronary artery of native heart with angina pectoris: Stable Angina   2. Hx of CABG    3. Post PTCA    4. Coronary atherosclerosis of autologous vein bypass graft without angina    5. Orthostatic hypotension : Improved on midodrine   6. Dyslipidemia :  on high intensity statin At goal       Plan:       66 year old male patient with known CAD and CABG in 2000 presents with signs and symptoms of stable angina. He would need a LHC at this time.   Patient does not have symptoms acutely at this time.   Will try to get him seen in Interventional cardiology clinic today.       D/w Dr Griggs.    Yassine Dominguez MD, FACP  Cardiovascular Disease Fellow( PGY 6)  Pager: [phone]

## 2017-07-19 NOTE — PROGRESS NOTES
Subjective:    Patient ID:  Sherif Knutson Jr. is a 66 y.o. male who presents for evaluation of Coronary Artery Disease      HPI  Mr. Knutson is a 66 year old man with known CAD s/p 5-vessel CABG in 2000, PCI to RCA in 2006 and PCI to LCx in may 2013 (at [facility]), left subclavian stenosis, hypertension, DLD, diabetes mellitus who is referred by Dr. Christiansen/Anson for complaints of CCS Class 3 symptoms.  He underwent cardiac cath on 8/2/13. His LIMA to LAD was patent but his LAD distal to the LIMA anastomosis was diffusely diseased. Additionally he was found to have high grade left subclavian stenosis. He underwent PTAS of the left subclavian artery at the time of cardiac cath with improvement in flow down the LIMA graft by angiography. The patient reports that he had been free of anginasince 8/2/13 until about 2 months ago.  At that time he experienced occasional chest discomfort.  However the patient reports that during the last 1 month he has been experiencing the sensation of indigestion with physical exertion.  WIth continue exertion the patient experiences chest heaviness radiating to his back  This is followed by shorntess of breath and nausea.  If he continue to exert himself he experiences lightheadedness and near syncope.  He reports 2 syncopal events during the last month.  He denies palpitations.  However both syncopal events were precipitated by the exertion and the above symptoms folloewd by the sensation of nears syncope.  At present the patient states he can awalk no more than 60 feet before the onset of angina.  He denies rest pain. His angina is relieved with rest.  He denies LE nghia.  However he has been sleeping in a lounge chair for the last 2 months due to orthopnea. He reports PND 2-3 times a week during the last 2 months.  He denies claudication.  Mr. Knutson reports good medication compliance.    Past Medical History:   Diagnosis Date    Coronary artery disease 2006, 5/2013    s/p pci to  "rca (2006), LCx (5/2013)    DM (diabetes mellitus)     HLD (hyperlipidemia)     HTN (hypertension)     Kidney stone     EMELI (obstructive sleep apnea) 11/2/2015    Subclavian artery stenosis, left      Past Surgical History:   Procedure Laterality Date    BACK SURGERY      CHOLECYSTECTOMY  2011    CORONARY ANGIOPLASTY      CORONARY ARTERY BYPASS GRAFT  2000    5 bypass surgeries    TONSILLECTOMY       Current Outpatient Prescriptions on File Prior to Visit   Medication Sig Dispense Refill    aspirin (ECOTRIN) 81 MG EC tablet Take 81 mg by mouth once daily.      CYANOCOBALAMIN, VITAMIN B-12, (VITAMIN B-12 ORAL) Take by mouth.      LYRICA 100 mg capsule 2 (two) times daily.       metoprolol succinate (TOPROL-XL) 25 MG 24 hr tablet Take 1 tablet (25 mg total) by mouth once daily. 30 tablet 5    midodrine (PROAMATINE) 2.5 MG Tab Take 1 tablet (2.5 mg total) by mouth 3 (three) times daily. 90 tablet 11    NOVOLOG 100 unit/mL injection       NOVOLOG FLEXPEN 100 unit/mL InPn pen as needed.       potassium citrate (UROCIT-K) 10 mEq (1,080 mg) TbSR Take 2 tablets (20 mEq total) by mouth 2 (two) times daily with meals. 120 tablet 11    rosuvastatin (CRESTOR) 40 MG Tab Take 1 tablet (40 mg total) by mouth every evening. 30 tablet 6    BD ULTRA-FINE JERMAINE PEN NEEDLES 32 gauge x 5/32" Ndle       EASY TOUCH 31 X 5/16 " Ndle       oxybutynin (DITROPAN) 5 MG Tab Take 1 tablet (5 mg total) by mouth 3 (three) times daily. 30 tablet 0    oxycodone-acetaminophen (PERCOCET)  mg per tablet Take 1 tablet by mouth every 6 (six) hours as needed for Pain. 40 tablet 0    sub-q insulin device, 20 unit Lashay by Misc.(Non-Drug; Combo Route) route.      SURE COMFORT INSULIN SYRINGE 1/2 mL 31 x 5/16" Syrg       [DISCONTINUED] polyethylene glycol (GLYCOLAX) 17 gram PwPk Take 17 g by mouth once daily. 30 packet 0     No current facility-administered medications on file prior to visit.      Review of patient's allergies " indicates:   Allergen Reactions    Sulfa (sulfonamide antibiotics) Photosensitivity    Zocor [simvastatin] Other (See Comments)     lathargic     Social History   Substance Use Topics    Smoking status: Never Smoker    Smokeless tobacco: Never Used    Alcohol use No     Family History   Problem Relation Age of Onset    Heart disease Mother     Heart attack Mother     Heart disease Father     Heart attack Father     Hypertension Brother     Heart disease Paternal Uncle     Prostate cancer Neg Hx     Kidney disease Neg Hx            Review of Systems   Constitution: Negative for decreased appetite, diaphoresis, fever, malaise/fatigue, weight gain and weight loss.   HENT: Negative for congestion, nosebleeds and sore throat.    Eyes: Negative for blurred vision, vision loss in left eye, vision loss in right eye and visual disturbance.   Cardiovascular: Positive for chest pain, dyspnea on exertion, near-syncope, orthopnea, palpitations, paroxysmal nocturnal dyspnea and syncope. Negative for claudication and leg swelling.   Respiratory: Negative for cough, hemoptysis, shortness of breath and wheezing.    Endocrine: Negative for polyuria.   Hematologic/Lymphatic: Does not bruise/bleed easily.   Skin: Negative for nail changes and rash.   Musculoskeletal: Negative for back pain, muscle cramps and myalgias.   Gastrointestinal: Negative for abdominal pain, change in bowel habit, diarrhea, heartburn, hematemesis, hematochezia, melena, nausea and vomiting.   Genitourinary: Negative for bladder incontinence, dysuria, frequency and hematuria.   Psychiatric/Behavioral: Negative for depression.   Allergic/Immunologic: Negative for hives.        Objective:  Vitals:    07/18/17 1338 07/18/17 1341   BP: (!) 105/59 119/67   BP Location: Left arm Right arm   Patient Position: Sitting Sitting   BP Method: Automatic Automatic   Pulse: 76 74   SpO2: 98% 98%   Weight: 96.5 kg (212 lb 11.9 oz) 96.5 kg (212 lb 12.6 oz)   Height:  6' (1.829 m) 6' (1.829 m)         Physical Exam   Constitutional: He is oriented to person, place, and time. He appears well-developed and well-nourished.   HENT:   Head: Normocephalic and atraumatic.   Eyes: EOM are normal. Pupils are equal, round, and reactive to light.   Neck: Neck supple. No JVD present. No thyromegaly present.   Cardiovascular: Normal rate and regular rhythm.  PMI is displaced.  Exam reveals no gallop and no friction rub.    No murmur heard.  Pulses:       Carotid pulses are 2+ on the right side, and 2+ on the left side.       Radial pulses are 2+ on the right side, and 2+ on the left side.        Femoral pulses are 2+ on the right side, and 2+ on the left side.       Dorsalis pedis pulses are 2+ on the right side, and 2+ on the left side.        Posterior tibial pulses are 2+ on the right side, and 2+ on the left side.   Pulmonary/Chest: Effort normal. He has no wheezes. He has no rhonchi. He has no rales.   Abdominal: Soft. Normal appearance. He exhibits no distension. There is no hepatosplenomegaly. There is no tenderness.   Neurological: He is alert and oriented to person, place, and time. Gait normal.   Skin: Skin is warm and dry. No rash noted. No erythema.   Psychiatric: He has a normal mood and affect.         Assessment:       1. Angina, class III    2. Atherosclerosis of autologous vein coronary artery bypass graft with stable angina pectoris    3. Subclavian arterial stenosis    4. Hx of CABG    5. Dyslipidemia    6. Type 2 diabetes mellitus with diabetic peripheral angiopathy without gangrene, with long-term current use of insulin         Plan:       1) CAD.  The patient reports symptoms concerning for CCS 3 angina as well as NYHA class 3 symptoms.  It is likely that the patient has developed de radha coronary stenosis, ISR of his coronary stents, or ISR of his left subclavian stent or a combination of these problems. Of note the patient's blood pressure in his left arm is  significantl lower than the blood pressure in his right arm which is concerning for left subclavian stent ISR.  Therefore rec proceeding with cardiac catheterization and possible PCI.  -continue EC ASA 81mg po qday  -start Plavix 600mg po X1 and then 75mg po q day  -continue Toprol XL 25mgpo q day  -6Fr right CFA access; will perform diagnostic LH and possible PCI as well as LVG.  The risks, benefits, and alternatives of cardiac catheterization and possible intervention were discussed with the patient.  All questions were answered and informed consent was obtained.  -use of NTG SL reviwewed with patient; if he experiences angina not relieved with NTG SL X3, then he will call 911    2) Dyslipidemia. Continue statin therapy    3) CKD3. IV hydration pre-procedure; encourage po hydration post procedure; Visipaque contrast    4) DM2. rec tight glycemic control; will check HgbA1c when patient presents for cath    All of the patient's and his wife's questions were answered.

## 2017-07-24 ENCOUNTER — HOSPITAL ENCOUNTER (OUTPATIENT)
Facility: HOSPITAL | Age: 67
Discharge: HOME | End: 2017-07-24
Attending: INTERNAL MEDICINE | Admitting: INTERNAL MEDICINE
Payer: MEDICARE

## 2017-07-24 VITALS
OXYGEN SATURATION: 95 % | TEMPERATURE: 97 F | HEIGHT: 72 IN | RESPIRATION RATE: 14 BRPM | DIASTOLIC BLOOD PRESSURE: 77 MMHG | HEART RATE: 85 BPM | SYSTOLIC BLOOD PRESSURE: 152 MMHG | WEIGHT: 210.13 LBS | BODY MASS INDEX: 28.46 KG/M2

## 2017-07-24 DIAGNOSIS — Z98.61 POSTSURGICAL PERCUTANEOUS TRANSLUMINAL CORONARY ANGIOPLASTY STATUS: Primary | ICD-10-CM

## 2017-07-24 DIAGNOSIS — I20.9 ANGINA, CLASS III: ICD-10-CM

## 2017-07-24 DIAGNOSIS — I25.10 CORONARY ATHEROSCLEROSIS OF UNSPECIFIED TYPE OF VESSEL, NATIVE OR GRAFT: ICD-10-CM

## 2017-07-24 DIAGNOSIS — I25.10 ATHEROSCLEROTIC HEART DISEASE OF NATIVE CORONARY ARTERY WITHOUT ANGINA PECTORIS: ICD-10-CM

## 2017-07-24 LAB
ABO + RH BLD: NORMAL
ANION GAP SERPL CALC-SCNC: 10 MMOL/L
BASOPHILS # BLD AUTO: 0.03 K/UL
BASOPHILS NFR BLD: 0.4 %
BLD GP AB SCN CELLS X3 SERPL QL: NORMAL
BUN SERPL-MCNC: 16 MG/DL
CALCIUM SERPL-MCNC: 8.8 MG/DL
CHLORIDE SERPL-SCNC: 106 MMOL/L
CO2 SERPL-SCNC: 22 MMOL/L
CORONARY STENOSIS: ABNORMAL
CORONARY STENT: YES
CREAT SERPL-MCNC: 1.2 MG/DL
DIFFERENTIAL METHOD: NORMAL
EOSINOPHIL # BLD AUTO: 0.1 K/UL
EOSINOPHIL NFR BLD: 1.5 %
ERYTHROCYTE [DISTWIDTH] IN BLOOD BY AUTOMATED COUNT: 12 %
EST. GFR  (AFRICAN AMERICAN): >60 ML/MIN/1.73 M^2
EST. GFR  (NON AFRICAN AMERICAN): >60 ML/MIN/1.73 M^2
GLUCOSE SERPL-MCNC: 195 MG/DL
HCT VFR BLD AUTO: 43.5 %
HGB BLD-MCNC: 15 G/DL
LYMPHOCYTES # BLD AUTO: 2 K/UL
LYMPHOCYTES NFR BLD: 24.7 %
MCH RBC QN AUTO: 29.6 PG
MCHC RBC AUTO-ENTMCNC: 34.5 G/DL
MCV RBC AUTO: 86 FL
MONOCYTES # BLD AUTO: 0.7 K/UL
MONOCYTES NFR BLD: 8.9 %
NEUTROPHILS # BLD AUTO: 5.1 K/UL
NEUTROPHILS NFR BLD: 64.2 %
PLATELET # BLD AUTO: 155 K/UL
PMV BLD AUTO: 10.1 FL
POCT GLUCOSE: 208 MG/DL (ref 70–110)
POTASSIUM SERPL-SCNC: 4 MMOL/L
RBC # BLD AUTO: 5.06 M/UL
SODIUM SERPL-SCNC: 138 MMOL/L
WBC # BLD AUTO: 7.98 K/UL

## 2017-07-24 PROCEDURE — 25000003 PHARM REV CODE 250

## 2017-07-24 PROCEDURE — 93010 ELECTROCARDIOGRAM REPORT: CPT | Mod: 76,,, | Performed by: INTERNAL MEDICINE

## 2017-07-24 PROCEDURE — 99152 MOD SED SAME PHYS/QHP 5/>YRS: CPT | Mod: ,,, | Performed by: INTERNAL MEDICINE

## 2017-07-24 PROCEDURE — 63600175 PHARM REV CODE 636 W HCPCS

## 2017-07-24 PROCEDURE — C1887 CATHETER, GUIDING: HCPCS

## 2017-07-24 PROCEDURE — C1760 CLOSURE DEV, VASC: HCPCS

## 2017-07-24 PROCEDURE — 25000003 PHARM REV CODE 250: Performed by: STUDENT IN AN ORGANIZED HEALTH CARE EDUCATION/TRAINING PROGRAM

## 2017-07-24 PROCEDURE — 93571 IV DOP VEL&/PRESS C FLO 1ST: CPT | Mod: 26,LC,, | Performed by: INTERNAL MEDICINE

## 2017-07-24 PROCEDURE — 85025 COMPLETE CBC W/AUTO DIFF WBC: CPT

## 2017-07-24 PROCEDURE — 86901 BLOOD TYPING SEROLOGIC RH(D): CPT

## 2017-07-24 PROCEDURE — 25000003 PHARM REV CODE 250: Performed by: INTERNAL MEDICINE

## 2017-07-24 PROCEDURE — 86900 BLOOD TYPING SEROLOGIC ABO: CPT

## 2017-07-24 PROCEDURE — 80048 BASIC METABOLIC PNL TOTAL CA: CPT

## 2017-07-24 PROCEDURE — 93010 ELECTROCARDIOGRAM REPORT: CPT | Mod: ,,, | Performed by: INTERNAL MEDICINE

## 2017-07-24 PROCEDURE — 93459 L HRT ART/GRFT ANGIO: CPT | Mod: 26,59,, | Performed by: INTERNAL MEDICINE

## 2017-07-24 PROCEDURE — 92928 PRQ TCAT PLMT NTRAC ST 1 LES: CPT | Mod: LC,,, | Performed by: INTERNAL MEDICINE

## 2017-07-24 PROCEDURE — 93005 ELECTROCARDIOGRAM TRACING: CPT

## 2017-07-24 PROCEDURE — 82962 GLUCOSE BLOOD TEST: CPT

## 2017-07-24 PROCEDURE — 75710 ARTERY X-RAYS ARM/LEG: CPT | Mod: 26,59,, | Performed by: INTERNAL MEDICINE

## 2017-07-24 RX ORDER — SODIUM CHLORIDE 9 MG/ML
5 INJECTION, SOLUTION INTRAVENOUS CONTINUOUS
Status: ACTIVE | OUTPATIENT
Start: 2017-07-24 | End: 2017-07-24

## 2017-07-24 RX ORDER — DIPHENHYDRAMINE HCL 50 MG
50 CAPSULE ORAL ONCE
Status: COMPLETED | OUTPATIENT
Start: 2017-07-24 | End: 2017-07-24

## 2017-07-24 RX ORDER — ASPIRIN 81 MG/1
81 TABLET ORAL DAILY
Status: DISCONTINUED | OUTPATIENT
Start: 2017-07-24 | End: 2017-07-24 | Stop reason: HOSPADM

## 2017-07-24 RX ORDER — ISOSORBIDE MONONITRATE 30 MG/1
30 TABLET, EXTENDED RELEASE ORAL DAILY
Qty: 30 TABLET | Refills: 11 | Status: ON HOLD | OUTPATIENT
Start: 2017-07-24 | End: 2017-07-31

## 2017-07-24 RX ORDER — MIDODRINE HYDROCHLORIDE 2.5 MG/1
2.5 TABLET ORAL 3 TIMES DAILY
Status: DISCONTINUED | OUTPATIENT
Start: 2017-07-24 | End: 2017-07-24 | Stop reason: HOSPADM

## 2017-07-24 RX ORDER — CLOPIDOGREL BISULFATE 75 MG/1
75 TABLET ORAL DAILY
Status: DISCONTINUED | OUTPATIENT
Start: 2017-07-25 | End: 2017-07-24 | Stop reason: HOSPADM

## 2017-07-24 RX ORDER — ISOSORBIDE MONONITRATE 30 MG/1
30 TABLET, EXTENDED RELEASE ORAL DAILY
Status: DISCONTINUED | OUTPATIENT
Start: 2017-07-24 | End: 2017-07-24 | Stop reason: HOSPADM

## 2017-07-24 RX ORDER — SODIUM CHLORIDE 9 MG/ML
3 INJECTION, SOLUTION INTRAVENOUS CONTINUOUS
Status: ACTIVE | OUTPATIENT
Start: 2017-07-24 | End: 2017-07-24

## 2017-07-24 RX ORDER — INSULIN ASPART 100 [IU]/ML
0-5 INJECTION, SOLUTION INTRAVENOUS; SUBCUTANEOUS
Status: DISCONTINUED | OUTPATIENT
Start: 2017-07-24 | End: 2017-07-24 | Stop reason: HOSPADM

## 2017-07-24 RX ORDER — IBUPROFEN 200 MG
24 TABLET ORAL
Status: DISCONTINUED | OUTPATIENT
Start: 2017-07-24 | End: 2017-07-24 | Stop reason: HOSPADM

## 2017-07-24 RX ORDER — PREGABALIN 50 MG/1
100 CAPSULE ORAL 2 TIMES DAILY
Status: DISCONTINUED | OUTPATIENT
Start: 2017-07-24 | End: 2017-07-24 | Stop reason: HOSPADM

## 2017-07-24 RX ORDER — GLUCAGON 1 MG
1 KIT INJECTION
Status: DISCONTINUED | OUTPATIENT
Start: 2017-07-24 | End: 2017-07-24 | Stop reason: HOSPADM

## 2017-07-24 RX ORDER — IBUPROFEN 200 MG
16 TABLET ORAL
Status: DISCONTINUED | OUTPATIENT
Start: 2017-07-24 | End: 2017-07-24 | Stop reason: HOSPADM

## 2017-07-24 RX ORDER — ROSUVASTATIN CALCIUM 20 MG/1
40 TABLET, COATED ORAL NIGHTLY
Status: DISCONTINUED | OUTPATIENT
Start: 2017-07-24 | End: 2017-07-24 | Stop reason: HOSPADM

## 2017-07-24 RX ORDER — METOPROLOL SUCCINATE 25 MG/1
25 TABLET, EXTENDED RELEASE ORAL DAILY
Status: DISCONTINUED | OUTPATIENT
Start: 2017-07-24 | End: 2017-07-24 | Stop reason: HOSPADM

## 2017-07-24 RX ADMIN — MIDODRINE HYDROCHLORIDE 2.5 MG: 2.5 TABLET ORAL at 02:07

## 2017-07-24 RX ADMIN — PREGABALIN 100 MG: 50 CAPSULE ORAL at 02:07

## 2017-07-24 RX ADMIN — METOPROLOL SUCCINATE 25 MG: 25 TABLET, EXTENDED RELEASE ORAL at 02:07

## 2017-07-24 RX ADMIN — SODIUM CHLORIDE 5 ML/KG/HR: 0.9 INJECTION, SOLUTION INTRAVENOUS at 01:07

## 2017-07-24 RX ADMIN — DIPHENHYDRAMINE HYDROCHLORIDE 50 MG: 50 CAPSULE ORAL at 08:07

## 2017-07-24 RX ADMIN — SODIUM CHLORIDE 5 ML/KG/HR: 0.9 INJECTION, SOLUTION INTRAVENOUS at 03:07

## 2017-07-24 RX ADMIN — ISOSORBIDE MONONITRATE 30 MG: 30 TABLET, EXTENDED RELEASE ORAL at 02:07

## 2017-07-24 RX ADMIN — SODIUM CHLORIDE 3 ML/KG/HR: 0.9 INJECTION, SOLUTION INTRAVENOUS at 08:07

## 2017-07-24 RX ADMIN — ASPIRIN 81 MG: 81 TABLET, COATED ORAL at 02:07

## 2017-07-24 NOTE — PROGRESS NOTES
Right dorsalis pedis pulse found via doppler. Scant drainage noted on right groin dressing. No hematoma noted at site. MD at bedside to assess site and change dressing.

## 2017-07-24 NOTE — PROGRESS NOTES
Discharge instructions provided to and gone over with patient and spouse. Understanding verbalized. IV's x 2 removed, tips intact. Tele monitor removed. Transport pending.

## 2017-07-24 NOTE — PROGRESS NOTES
Patient arrived to OBS # 6. Patient transferred from stretcher to bed. Bed locked in low with 2 side rails up. Call light within reach. Right groin dressing clean, dry, and intact. Dorsalis pedis pulses dopplered. Patient instructed he must remain flat until 1330 and that he will be able to get out of bed at 1530. Diet menu and ice water provided. Wife educated on ordering process. No complaints at this time.

## 2017-07-24 NOTE — DISCHARGE SUMMARY
"Discharge Summary  Interventional Cardiology      Admit Date: 7/24/2017    Discharge Date:  7/24/2017    Attending Physician: Juancho Black MD    Discharge Physician: Kd Arce DO    Principal Diagnoses: s/p PCI    Indication for Admission: [facility]    Discharged Condition: Good    Hospital Course:   Patient presented for outpatient [facility] which went without complication. C was notable for 70% OM3 stenosis FFR=0.80 which was successful stented with TYSON. Also noted was patent LIMA to LAD, 80% proximal LAD stenosis;  of mid LAD, 10% ISR of left subclavian artery stent. No pressure gradient between the left subclavian artery (distal to the stent) and the aorta was noted.    Outpatient Plan:  - Follow-up appointment With Dr Kodi Allen as scheduled  - Medication changes/ additions include: Starting Imdur 30mg PO daily    Diet: Cardiac diet    Activity: Activity instructions provided, Wound care instructions provided    Disposition: Home or Self Care    Discharge Medications:      Medication List      START taking these medications    isosorbide mononitrate 30 MG 24 hr tablet  Commonly known as:  IMDUR  Take 1 tablet (30 mg total) by mouth once daily.        CONTINUE taking these medications    aspirin 81 MG EC tablet  Commonly known as:  ECOTRIN     clopidogrel 75 mg tablet  Commonly known as:  PLAVIX  Take 600mg po X1 today and then 75mg po qday     EASY TOUCH 31 gauge x 5/16" Ndle  Generic drug:  pen needle, diabetic     BD ULTRA-FINE JERMAINE PEN NEEDLES 32 gauge x 5/32" Ndle  Generic drug:  pen needle, diabetic     LYRICA 100 MG capsule  Generic drug:  pregabalin     metoprolol succinate 25 MG 24 hr tablet  Commonly known as:  TOPROL-XL  Take 1 tablet (25 mg total) by mouth once daily.     midodrine 2.5 MG Tab  Commonly known as:  PROAMATINE  Take 1 tablet (2.5 mg total) by mouth 3 (three) times daily.     * NOVOLOG FLEXPEN 100 unit/mL Inpn pen  Generic drug:  insulin aspart     * NOVOLOG 100 unit/mL " injection  Generic drug:  insulin aspart     oxycodone-acetaminophen  mg per tablet  Commonly known as:  PERCOCET  Take 1 tablet by mouth every 6 (six) hours as needed for Pain.     potassium citrate 10 mEq (1,080 mg) Tbsr  Commonly known as:  UROCIT-K  Take 2 tablets (20 mEq total) by mouth 2 (two) times daily with meals.     rosuvastatin 40 MG Tab  Commonly known as:  CRESTOR  Take 1 tablet (40 mg total) by mouth every evening.     sub-q insulin device, 20 unit Lashay     SURE COMFORT INSULIN SYRINGE 0.5 mL 31 gauge x 5/16 Syrg  Generic drug:  insulin syringe-needle U-100     VITAMIN B-12 ORAL        * This list has 2 medication(s) that are the same as other medications prescribed for you. Read the directions carefully, and ask your doctor or other care provider to review them with you.            STOP taking these medications    oxybutynin 5 MG Tab  Commonly known as:  DITROPAN     Patient was not taking this med prior to admission      Where to Get Your Medications      These medications were sent to [facility] - [place] - [address] [facility]  [address] [address]    Phone:  [phone]   · isosorbide mononitrate 30 MG 24 hr tablet       Follow Up:  Follow-up Information     Kodi Allen MD.    Specialty:  Cardiology  Contact information:  [address]  [facility] [address] [phone]             Follow up In 2 weeks.

## 2017-07-24 NOTE — PROGRESS NOTES
Post Cath Note  Referring Physician: Juancho Black MD  Procedure: HEART CATH WITH GRAFTS-LEFT (N/A)       Access: Right CFA    LVEDP 10 mmHg    See full report for further details    Intervention:     PCI to OM3 with TYSON    See report for further details    Closure device: Mynx    Post Cath Exam:   Ht 6' (1.829 m)   Wt 95.3 kg (210 lb)   BMI 28.48 kg/m²   No unusual pain, hematoma, thrill or bruit at vascular access site.  Distal pulse present without signs of ischemia.    Recommendations:   - Routine post-cath care  - IVF at 5ml/kg/hr for 4 hrs  - Cardiac rehab referral, Continue medical management, Risk factor reduction, Plavix for at least 1 year and ASA 81 mg indefinitely, Follow-up with outpatient cardiologist      Signed:  Kd Arce DO  Cardiology Fellow  Pager [phone]

## 2017-07-24 NOTE — CONSULTS
"Cardiac Rehab     Sherif Knutson Jr.   [id number]   7/24/2017       Activity taught: Yes    Cardiac Rehab Phase Taught: Phase I & II    Risk Factors-Modifiable: diabetes, nutrition, hyperlipidemia, hypertension, sedentary lifestyle, stress, exercise    Risk Factors-Non modifiable: age, gender    Teaching Method: Verbal, Written and Living with Heart Disease book.    Understanding/Response: Pt verbalized understanding, all questions answered. Pt understands their cardiac rehab order is good for 12 months.   Pt given external order for [facility] - Pt lives in [place] and states has attended [facility] in the past.     Comments: S/P PTCA. Discussed cardiac rehab and risk factor modification. Team to refer patient to [facility]. Educational materials were used in the process and given to the patient. They included "Your Guide to Living with Heart Disease", Phase Two Cardiac Rehabilitation information along with a sample Mediterranean diet.The patient expressed understanding of the teaching and expressed desire to take a role in modifying the risk factors when they return home.    ANGELES Moore RN  Cardiac Rehab Nurse      "

## 2017-07-24 NOTE — H&P (VIEW-ONLY)
Subjective:    Patient ID:  Sherif Knutson Jr. is a 66 y.o. male who presents for evaluation of Coronary Artery Disease      HPI  Mr. Knutson is a 66 year old man with known CAD s/p 5-vessel CABG in 2000, PCI to RCA in 2006 and PCI to LCx in may 2013 (at [facility]), left subclavian stenosis, hypertension, DLD, diabetes mellitus who is referred by Dr. Christiansen/Anson for complaints of CCS Class 3 symptoms.  He underwent cardiac cath on 8/2/13. His LIMA to LAD was patent but his LAD distal to the LIMA anastomosis was diffusely diseased. Additionally he was found to have high grade left subclavian stenosis. He underwent PTAS of the left subclavian artery at the time of cardiac cath with improvement in flow down the LIMA graft by angiography. The patient reports that he had been free of anginasince 8/2/13 until about 2 months ago.  At that time he experienced occasional chest discomfort.  However the patient reports that during the last 1 month he has been experiencing the sensation of indigestion with physical exertion.  WIth continue exertion the patient experiences chest heaviness radiating to his back  This is followed by shorntess of breath and nausea.  If he continue to exert himself he experiences lightheadedness and near syncope.  He reports 2 syncopal events during the last month.  He denies palpitations.  However both syncopal events were precipitated by the exertion and the above symptoms folloewd by the sensation of nears syncope.  At present the patient states he can awalk no more than 60 feet before the onset of angina.  He denies rest pain. His angina is relieved with rest.  He denies LE nghia.  However he has been sleeping in a lounge chair for the last 2 months due to orthopnea. He reports PND 2-3 times a week during the last 2 months.  He denies claudication.  Mr. Knutson reports good medication compliance.    Past Medical History:   Diagnosis Date    Coronary artery disease 2006, 5/2013    s/p pci to  "rca (2006), LCx (5/2013)    DM (diabetes mellitus)     HLD (hyperlipidemia)     HTN (hypertension)     Kidney stone     EMELI (obstructive sleep apnea) 11/2/2015    Subclavian artery stenosis, left      Past Surgical History:   Procedure Laterality Date    BACK SURGERY      CHOLECYSTECTOMY  2011    CORONARY ANGIOPLASTY      CORONARY ARTERY BYPASS GRAFT  2000    5 bypass surgeries    TONSILLECTOMY       Current Outpatient Prescriptions on File Prior to Visit   Medication Sig Dispense Refill    aspirin (ECOTRIN) 81 MG EC tablet Take 81 mg by mouth once daily.      CYANOCOBALAMIN, VITAMIN B-12, (VITAMIN B-12 ORAL) Take by mouth.      LYRICA 100 mg capsule 2 (two) times daily.       metoprolol succinate (TOPROL-XL) 25 MG 24 hr tablet Take 1 tablet (25 mg total) by mouth once daily. 30 tablet 5    midodrine (PROAMATINE) 2.5 MG Tab Take 1 tablet (2.5 mg total) by mouth 3 (three) times daily. 90 tablet 11    NOVOLOG 100 unit/mL injection       NOVOLOG FLEXPEN 100 unit/mL InPn pen as needed.       potassium citrate (UROCIT-K) 10 mEq (1,080 mg) TbSR Take 2 tablets (20 mEq total) by mouth 2 (two) times daily with meals. 120 tablet 11    rosuvastatin (CRESTOR) 40 MG Tab Take 1 tablet (40 mg total) by mouth every evening. 30 tablet 6    BD ULTRA-FINE JERMAINE PEN NEEDLES 32 gauge x 5/32" Ndle       EASY TOUCH 31 X 5/16 " Ndle       oxybutynin (DITROPAN) 5 MG Tab Take 1 tablet (5 mg total) by mouth 3 (three) times daily. 30 tablet 0    oxycodone-acetaminophen (PERCOCET)  mg per tablet Take 1 tablet by mouth every 6 (six) hours as needed for Pain. 40 tablet 0    sub-q insulin device, 20 unit Lashay by Misc.(Non-Drug; Combo Route) route.      SURE COMFORT INSULIN SYRINGE 1/2 mL 31 x 5/16" Syrg       [DISCONTINUED] polyethylene glycol (GLYCOLAX) 17 gram PwPk Take 17 g by mouth once daily. 30 packet 0     No current facility-administered medications on file prior to visit.      Review of patient's allergies " indicates:   Allergen Reactions    Sulfa (sulfonamide antibiotics) Photosensitivity    Zocor [simvastatin] Other (See Comments)     lathargic     Social History   Substance Use Topics    Smoking status: Never Smoker    Smokeless tobacco: Never Used    Alcohol use No     Family History   Problem Relation Age of Onset    Heart disease Mother     Heart attack Mother     Heart disease Father     Heart attack Father     Hypertension Brother     Heart disease Paternal Uncle     Prostate cancer Neg Hx     Kidney disease Neg Hx            Review of Systems   Constitution: Negative for decreased appetite, diaphoresis, fever, malaise/fatigue, weight gain and weight loss.   HENT: Negative for congestion, nosebleeds and sore throat.    Eyes: Negative for blurred vision, vision loss in left eye, vision loss in right eye and visual disturbance.   Cardiovascular: Positive for chest pain, dyspnea on exertion, near-syncope, orthopnea, palpitations, paroxysmal nocturnal dyspnea and syncope. Negative for claudication and leg swelling.   Respiratory: Negative for cough, hemoptysis, shortness of breath and wheezing.    Endocrine: Negative for polyuria.   Hematologic/Lymphatic: Does not bruise/bleed easily.   Skin: Negative for nail changes and rash.   Musculoskeletal: Negative for back pain, muscle cramps and myalgias.   Gastrointestinal: Negative for abdominal pain, change in bowel habit, diarrhea, heartburn, hematemesis, hematochezia, melena, nausea and vomiting.   Genitourinary: Negative for bladder incontinence, dysuria, frequency and hematuria.   Psychiatric/Behavioral: Negative for depression.   Allergic/Immunologic: Negative for hives.        Objective:  Vitals:    07/18/17 1338 07/18/17 1341   BP: (!) 105/59 119/67   BP Location: Left arm Right arm   Patient Position: Sitting Sitting   BP Method: Automatic Automatic   Pulse: 76 74   SpO2: 98% 98%   Weight: 96.5 kg (212 lb 11.9 oz) 96.5 kg (212 lb 12.6 oz)   Height:  6' (1.829 m) 6' (1.829 m)         Physical Exam   Constitutional: He is oriented to person, place, and time. He appears well-developed and well-nourished.   HENT:   Head: Normocephalic and atraumatic.   Eyes: EOM are normal. Pupils are equal, round, and reactive to light.   Neck: Neck supple. No JVD present. No thyromegaly present.   Cardiovascular: Normal rate and regular rhythm.  PMI is displaced.  Exam reveals no gallop and no friction rub.    No murmur heard.  Pulses:       Carotid pulses are 2+ on the right side, and 2+ on the left side.       Radial pulses are 2+ on the right side, and 2+ on the left side.        Femoral pulses are 2+ on the right side, and 2+ on the left side.       Dorsalis pedis pulses are 2+ on the right side, and 2+ on the left side.        Posterior tibial pulses are 2+ on the right side, and 2+ on the left side.   Pulmonary/Chest: Effort normal. He has no wheezes. He has no rhonchi. He has no rales.   Abdominal: Soft. Normal appearance. He exhibits no distension. There is no hepatosplenomegaly. There is no tenderness.   Neurological: He is alert and oriented to person, place, and time. Gait normal.   Skin: Skin is warm and dry. No rash noted. No erythema.   Psychiatric: He has a normal mood and affect.         Assessment:       1. Angina, class III    2. Atherosclerosis of autologous vein coronary artery bypass graft with stable angina pectoris    3. Subclavian arterial stenosis    4. Hx of CABG    5. Dyslipidemia    6. Type 2 diabetes mellitus with diabetic peripheral angiopathy without gangrene, with long-term current use of insulin         Plan:       1) CAD.  The patient reports symptoms concerning for CCS 3 angina as well as NYHA class 3 symptoms.  It is likely that the patient has developed de radha coronary stenosis, ISR of his coronary stents, or ISR of his left subclavian stent or a combination of these problems. Of note the patient's blood pressure in his left arm is  significantl lower than the blood pressure in his right arm which is concerning for left subclavian stent ISR.  Therefore rec proceeding with cardiac catheterization and possible PCI.  -continue EC ASA 81mg po qday  -start Plavix 600mg po X1 and then 75mg po q day  -continue Toprol XL 25mgpo q day  -6Fr right CFA access; will perform diagnostic LH and possible PCI as well as LVG.  The risks, benefits, and alternatives of cardiac catheterization and possible intervention were discussed with the patient.  All questions were answered and informed consent was obtained.  -use of NTG SL reviwewed with patient; if he experiences angina not relieved with NTG SL X3, then he will call 911    2) Dyslipidemia. Continue statin therapy    3) CKD3. IV hydration pre-procedure; encourage po hydration post procedure; Visipaque contrast    4) DM2. rec tight glycemic control; will check HgbA1c when patient presents for cath    All of the patient's and his wife's questions were answered.

## 2017-07-24 NOTE — PLAN OF CARE
Problem: Patient Care Overview  Goal: Plan of Care Review  Outcome: Ongoing (interventions implemented as appropriate)  Patient AAOx4. Safety maintained. Bed locked in low with 2 side rails up. Call light within reach. Blood glucose monitoring ongoing. No events noted on tele.

## 2017-07-24 NOTE — NURSING
Patient ambulated on unit this am with wife at side.  Denies pain or SOB.  VSS.  CBG = 208 this am.  Verbalizes understanding of procedure. Will continue to monitor.

## 2017-07-25 LAB
POC ACTIVATED CLOTTING TIME K: 213 SEC (ref 74–137)
SAMPLE: ABNORMAL

## 2017-07-30 ENCOUNTER — HOSPITAL ENCOUNTER (OUTPATIENT)
Facility: HOSPITAL | Age: 67
Discharge: HOME | End: 2017-07-31
Attending: EMERGENCY MEDICINE | Admitting: HOSPITALIST
Payer: MEDICARE

## 2017-07-30 DIAGNOSIS — Z95.1 HX OF CABG: ICD-10-CM

## 2017-07-30 DIAGNOSIS — N20.0 NEPHROLITHIASIS: ICD-10-CM

## 2017-07-30 DIAGNOSIS — G47.33 OSA (OBSTRUCTIVE SLEEP APNEA): ICD-10-CM

## 2017-07-30 DIAGNOSIS — N20.9 UROLITHIASIS: ICD-10-CM

## 2017-07-30 DIAGNOSIS — E78.5 DYSLIPIDEMIA: ICD-10-CM

## 2017-07-30 DIAGNOSIS — R42 EPISODIC LIGHTHEADEDNESS: ICD-10-CM

## 2017-07-30 DIAGNOSIS — I25.718 ATHEROSCLEROSIS OF AUTOLOGOUS VEIN CORONARY ARTERY BYPASS GRAFT WITH STABLE ANGINA PECTORIS: ICD-10-CM

## 2017-07-30 DIAGNOSIS — I25.10 CORONARY ARTERY DISEASE: Primary | ICD-10-CM

## 2017-07-30 DIAGNOSIS — E11.51 TYPE 2 DIABETES MELLITUS WITH DIABETIC PERIPHERAL ANGIOPATHY WITHOUT GANGRENE, WITH LONG-TERM CURRENT USE OF INSULIN: ICD-10-CM

## 2017-07-30 DIAGNOSIS — I77.1 SUBCLAVIAN ARTERIAL STENOSIS: ICD-10-CM

## 2017-07-30 DIAGNOSIS — I20.9 ANGINA, CLASS III: ICD-10-CM

## 2017-07-30 DIAGNOSIS — R07.9 CHEST PAIN: ICD-10-CM

## 2017-07-30 DIAGNOSIS — Z98.61 POST PTCA: Chronic | ICD-10-CM

## 2017-07-30 DIAGNOSIS — I95.1 ORTHOSTATIC HYPOTENSION: ICD-10-CM

## 2017-07-30 DIAGNOSIS — Z79.4 TYPE 2 DIABETES MELLITUS WITH DIABETIC PERIPHERAL ANGIOPATHY WITHOUT GANGRENE, WITH LONG-TERM CURRENT USE OF INSULIN: ICD-10-CM

## 2017-07-30 DIAGNOSIS — Z01.810 PREOPERATIVE CARDIOVASCULAR EXAMINATION: ICD-10-CM

## 2017-07-30 DIAGNOSIS — R06.02 SHORTNESS OF BREATH AT REST: ICD-10-CM

## 2017-07-30 LAB
ALBUMIN SERPL BCP-MCNC: 3.6 G/DL
ALP SERPL-CCNC: 95 U/L
ALT SERPL W/O P-5'-P-CCNC: 32 U/L
ANION GAP SERPL CALC-SCNC: 11 MMOL/L
AST SERPL-CCNC: 31 U/L
BASOPHILS # BLD AUTO: 0.02 K/UL
BASOPHILS NFR BLD: 0.3 %
BILIRUB SERPL-MCNC: 1.2 MG/DL
BNP SERPL-MCNC: 22 PG/ML
BUN SERPL-MCNC: 19 MG/DL
CALCIUM SERPL-MCNC: 9.5 MG/DL
CHLORIDE SERPL-SCNC: 106 MMOL/L
CO2 SERPL-SCNC: 23 MMOL/L
CREAT SERPL-MCNC: 1.4 MG/DL
DIFFERENTIAL METHOD: NORMAL
EOSINOPHIL # BLD AUTO: 0.1 K/UL
EOSINOPHIL NFR BLD: 1.8 %
ERYTHROCYTE [DISTWIDTH] IN BLOOD BY AUTOMATED COUNT: 12.3 %
EST. GFR  (AFRICAN AMERICAN): >60 ML/MIN/1.73 M^2
EST. GFR  (NON AFRICAN AMERICAN): 52.4 ML/MIN/1.73 M^2
GLUCOSE SERPL-MCNC: 132 MG/DL
HCT VFR BLD AUTO: 42.5 %
HGB BLD-MCNC: 15 G/DL
LYMPHOCYTES # BLD AUTO: 1.9 K/UL
LYMPHOCYTES NFR BLD: 29.7 %
MCH RBC QN AUTO: 30 PG
MCHC RBC AUTO-ENTMCNC: 35.3 G/DL
MCV RBC AUTO: 85 FL
MONOCYTES # BLD AUTO: 0.5 K/UL
MONOCYTES NFR BLD: 8.3 %
NEUTROPHILS # BLD AUTO: 3.7 K/UL
NEUTROPHILS NFR BLD: 59.7 %
PLATELET # BLD AUTO: 158 K/UL
PMV BLD AUTO: 10.1 FL
POTASSIUM SERPL-SCNC: 4.3 MMOL/L
PROT SERPL-MCNC: 7 G/DL
RBC # BLD AUTO: 5 M/UL
SODIUM SERPL-SCNC: 140 MMOL/L
TROPONIN I SERPL DL<=0.01 NG/ML-MCNC: 0.01 NG/ML
WBC # BLD AUTO: 6.26 K/UL

## 2017-07-30 PROCEDURE — 93307 TTE W/O DOPPLER COMPLETE: CPT

## 2017-07-30 PROCEDURE — 99285 EMERGENCY DEPT VISIT HI MDM: CPT | Mod: ,,, | Performed by: EMERGENCY MEDICINE

## 2017-07-30 PROCEDURE — 93010 ELECTROCARDIOGRAM REPORT: CPT | Mod: ,,, | Performed by: INTERNAL MEDICINE

## 2017-07-30 PROCEDURE — 99285 EMERGENCY DEPT VISIT HI MDM: CPT | Mod: 25

## 2017-07-30 PROCEDURE — 93307 TTE W/O DOPPLER COMPLETE: CPT | Mod: 26,,, | Performed by: INTERNAL MEDICINE

## 2017-07-30 PROCEDURE — 85025 COMPLETE CBC W/AUTO DIFF WBC: CPT

## 2017-07-30 PROCEDURE — 93005 ELECTROCARDIOGRAM TRACING: CPT

## 2017-07-30 PROCEDURE — G0378 HOSPITAL OBSERVATION PER HR: HCPCS

## 2017-07-30 PROCEDURE — 36000 PLACE NEEDLE IN VEIN: CPT

## 2017-07-30 PROCEDURE — 84484 ASSAY OF TROPONIN QUANT: CPT

## 2017-07-30 PROCEDURE — 83880 ASSAY OF NATRIURETIC PEPTIDE: CPT

## 2017-07-30 PROCEDURE — 25000003 PHARM REV CODE 250: Performed by: EMERGENCY MEDICINE

## 2017-07-30 PROCEDURE — 80053 COMPREHEN METABOLIC PANEL: CPT

## 2017-07-30 RX ORDER — ASPIRIN 325 MG
325 TABLET ORAL
Status: COMPLETED | OUTPATIENT
Start: 2017-07-30 | End: 2017-07-30

## 2017-07-30 RX ADMIN — Medication 325 MG: at 09:07

## 2017-07-31 VITALS
DIASTOLIC BLOOD PRESSURE: 79 MMHG | SYSTOLIC BLOOD PRESSURE: 156 MMHG | TEMPERATURE: 98 F | HEART RATE: 78 BPM | WEIGHT: 209.19 LBS | BODY MASS INDEX: 28.33 KG/M2 | RESPIRATION RATE: 17 BRPM | OXYGEN SATURATION: 91 % | HEIGHT: 72 IN

## 2017-07-31 LAB
ALBUMIN SERPL BCP-MCNC: 3.3 G/DL
ALP SERPL-CCNC: 90 U/L
ALT SERPL W/O P-5'-P-CCNC: 26 U/L
ANION GAP SERPL CALC-SCNC: 9 MMOL/L
APTT BLDCRRT: 23.2 SEC
AST SERPL-CCNC: 26 U/L
BASOPHILS # BLD AUTO: 0.03 K/UL
BASOPHILS NFR BLD: 0.5 %
BILIRUB SERPL-MCNC: 1 MG/DL
BUN SERPL-MCNC: 19 MG/DL
CALCIUM SERPL-MCNC: 8.9 MG/DL
CHLORIDE SERPL-SCNC: 107 MMOL/L
CO2 SERPL-SCNC: 25 MMOL/L
CREAT SERPL-MCNC: 1.2 MG/DL
DIFFERENTIAL METHOD: NORMAL
EOSINOPHIL # BLD AUTO: 0.2 K/UL
EOSINOPHIL NFR BLD: 2.4 %
ERYTHROCYTE [DISTWIDTH] IN BLOOD BY AUTOMATED COUNT: 12.3 %
EST. GFR  (AFRICAN AMERICAN): >60 ML/MIN/1.73 M^2
EST. GFR  (NON AFRICAN AMERICAN): >60 ML/MIN/1.73 M^2
ESTIMATED AVG GLUCOSE: 200 MG/DL
GLUCOSE SERPL-MCNC: 92 MG/DL
HBA1C MFR BLD HPLC: 8.6 %
HCT VFR BLD AUTO: 41.5 %
HGB BLD-MCNC: 14.2 G/DL
INR PPP: 1
LYMPHOCYTES # BLD AUTO: 2.4 K/UL
LYMPHOCYTES NFR BLD: 38.5 %
MAGNESIUM SERPL-MCNC: 2.2 MG/DL
MCH RBC QN AUTO: 30 PG
MCHC RBC AUTO-ENTMCNC: 34.2 G/DL
MCV RBC AUTO: 88 FL
MONOCYTES # BLD AUTO: 0.7 K/UL
MONOCYTES NFR BLD: 10.7 %
NEUTROPHILS # BLD AUTO: 3 K/UL
NEUTROPHILS NFR BLD: 47.9 %
PHOSPHATE SERPL-MCNC: 3.7 MG/DL
PLATELET # BLD AUTO: 177 K/UL
PMV BLD AUTO: 10.2 FL
POCT GLUCOSE: 128 MG/DL (ref 70–110)
POCT GLUCOSE: 166 MG/DL (ref 70–110)
POTASSIUM SERPL-SCNC: 4 MMOL/L
PROT SERPL-MCNC: 6.5 G/DL
PROTHROMBIN TIME: 10.4 SEC
RBC # BLD AUTO: 4.73 M/UL
RETIRED EF AND QEF - SEE NOTES: 60 (ref 55–65)
SODIUM SERPL-SCNC: 141 MMOL/L
TROPONIN I SERPL DL<=0.01 NG/ML-MCNC: 0.01 NG/ML
TROPONIN I SERPL DL<=0.01 NG/ML-MCNC: 0.01 NG/ML
WBC # BLD AUTO: 6.29 K/UL

## 2017-07-31 PROCEDURE — 80053 COMPREHEN METABOLIC PANEL: CPT

## 2017-07-31 PROCEDURE — 83036 HEMOGLOBIN GLYCOSYLATED A1C: CPT

## 2017-07-31 PROCEDURE — G0378 HOSPITAL OBSERVATION PER HR: HCPCS

## 2017-07-31 PROCEDURE — 84484 ASSAY OF TROPONIN QUANT: CPT

## 2017-07-31 PROCEDURE — 25000003 PHARM REV CODE 250: Performed by: NURSE PRACTITIONER

## 2017-07-31 PROCEDURE — 99217 PR OBSERVATION CARE DISCHARGE: CPT | Mod: ,,, | Performed by: HOSPITALIST

## 2017-07-31 PROCEDURE — 85025 COMPLETE CBC W/AUTO DIFF WBC: CPT

## 2017-07-31 PROCEDURE — 85610 PROTHROMBIN TIME: CPT

## 2017-07-31 PROCEDURE — 85730 THROMBOPLASTIN TIME PARTIAL: CPT

## 2017-07-31 PROCEDURE — 84100 ASSAY OF PHOSPHORUS: CPT

## 2017-07-31 PROCEDURE — 83735 ASSAY OF MAGNESIUM: CPT

## 2017-07-31 PROCEDURE — 36415 COLL VENOUS BLD VENIPUNCTURE: CPT

## 2017-07-31 PROCEDURE — 25000003 PHARM REV CODE 250: Performed by: EMERGENCY MEDICINE

## 2017-07-31 PROCEDURE — 99220 PR INITIAL OBSERVATION CARE,LEVL III: CPT | Mod: ,,, | Performed by: NURSE PRACTITIONER

## 2017-07-31 RX ORDER — NITROGLYCERIN 0.4 MG/1
0.4 TABLET SUBLINGUAL
Status: DISCONTINUED | OUTPATIENT
Start: 2017-07-31 | End: 2017-07-31

## 2017-07-31 RX ORDER — IBUPROFEN 200 MG
16 TABLET ORAL
Status: DISCONTINUED | OUTPATIENT
Start: 2017-07-31 | End: 2017-07-31 | Stop reason: HOSPADM

## 2017-07-31 RX ORDER — CLOPIDOGREL BISULFATE 75 MG/1
75 TABLET ORAL DAILY
Status: DISCONTINUED | OUTPATIENT
Start: 2017-07-31 | End: 2017-07-31 | Stop reason: HOSPADM

## 2017-07-31 RX ORDER — AMOXICILLIN 250 MG
1 CAPSULE ORAL 2 TIMES DAILY PRN
Status: DISCONTINUED | OUTPATIENT
Start: 2017-07-31 | End: 2017-07-31 | Stop reason: HOSPADM

## 2017-07-31 RX ORDER — NITROGLYCERIN 0.4 MG/1
0.4 TABLET SUBLINGUAL EVERY 5 MIN PRN
Status: DISCONTINUED | OUTPATIENT
Start: 2017-07-31 | End: 2017-07-31 | Stop reason: HOSPADM

## 2017-07-31 RX ORDER — ACETAMINOPHEN 325 MG/1
650 TABLET ORAL EVERY 6 HOURS PRN
Status: DISCONTINUED | OUTPATIENT
Start: 2017-07-31 | End: 2017-07-31 | Stop reason: HOSPADM

## 2017-07-31 RX ORDER — METOPROLOL SUCCINATE 25 MG/1
25 TABLET, EXTENDED RELEASE ORAL DAILY
Status: DISCONTINUED | OUTPATIENT
Start: 2017-07-31 | End: 2017-07-31 | Stop reason: HOSPADM

## 2017-07-31 RX ORDER — ASPIRIN 81 MG/1
81 TABLET ORAL DAILY
Status: DISCONTINUED | OUTPATIENT
Start: 2017-07-31 | End: 2017-07-31 | Stop reason: HOSPADM

## 2017-07-31 RX ORDER — IBUPROFEN 200 MG
24 TABLET ORAL
Status: DISCONTINUED | OUTPATIENT
Start: 2017-07-31 | End: 2017-07-31 | Stop reason: HOSPADM

## 2017-07-31 RX ORDER — ROSUVASTATIN CALCIUM 20 MG/1
40 TABLET, COATED ORAL NIGHTLY
Status: DISCONTINUED | OUTPATIENT
Start: 2017-07-31 | End: 2017-07-31 | Stop reason: HOSPADM

## 2017-07-31 RX ORDER — SODIUM CHLORIDE 9 MG/ML
INJECTION, SOLUTION INTRAVENOUS CONTINUOUS
Status: ACTIVE | OUTPATIENT
Start: 2017-07-31 | End: 2017-07-31

## 2017-07-31 RX ORDER — NITROGLYCERIN 0.4 MG/1
0.4 TABLET SUBLINGUAL EVERY 5 MIN PRN
Qty: 20 TABLET | Refills: 0 | Status: SHIPPED | OUTPATIENT
Start: 2017-07-31 | End: 2020-07-01 | Stop reason: SDUPTHER

## 2017-07-31 RX ORDER — ONDANSETRON 8 MG/1
8 TABLET, ORALLY DISINTEGRATING ORAL EVERY 8 HOURS PRN
Status: DISCONTINUED | OUTPATIENT
Start: 2017-07-31 | End: 2017-07-31 | Stop reason: HOSPADM

## 2017-07-31 RX ORDER — GLUCAGON 1 MG
1 KIT INJECTION
Status: DISCONTINUED | OUTPATIENT
Start: 2017-07-31 | End: 2017-07-31 | Stop reason: HOSPADM

## 2017-07-31 RX ORDER — MIDODRINE HYDROCHLORIDE 2.5 MG/1
2.5 TABLET ORAL 3 TIMES DAILY
Status: DISCONTINUED | OUTPATIENT
Start: 2017-07-31 | End: 2017-07-31 | Stop reason: HOSPADM

## 2017-07-31 RX ORDER — ONDANSETRON 2 MG/ML
4 INJECTION INTRAMUSCULAR; INTRAVENOUS EVERY 12 HOURS PRN
Status: DISCONTINUED | OUTPATIENT
Start: 2017-07-31 | End: 2017-07-31 | Stop reason: HOSPADM

## 2017-07-31 RX ORDER — POTASSIUM CITRATE 10 MEQ/1
20 TABLET, EXTENDED RELEASE ORAL 2 TIMES DAILY WITH MEALS
Status: DISCONTINUED | OUTPATIENT
Start: 2017-07-31 | End: 2017-07-31 | Stop reason: HOSPADM

## 2017-07-31 RX ORDER — ENOXAPARIN SODIUM 100 MG/ML
40 INJECTION SUBCUTANEOUS EVERY 24 HOURS
Status: DISCONTINUED | OUTPATIENT
Start: 2017-07-31 | End: 2017-07-31 | Stop reason: HOSPADM

## 2017-07-31 RX ORDER — ISOSORBIDE MONONITRATE 60 MG/1
60 TABLET, EXTENDED RELEASE ORAL DAILY
Qty: 30 TABLET | Refills: 0 | Status: ON HOLD | OUTPATIENT
Start: 2017-07-31 | End: 2018-04-03

## 2017-07-31 RX ORDER — INSULIN ASPART 100 [IU]/ML
0-5 INJECTION, SOLUTION INTRAVENOUS; SUBCUTANEOUS
Status: DISCONTINUED | OUTPATIENT
Start: 2017-07-31 | End: 2017-07-31 | Stop reason: HOSPADM

## 2017-07-31 RX ORDER — PREGABALIN 50 MG/1
100 CAPSULE ORAL 2 TIMES DAILY
Status: DISCONTINUED | OUTPATIENT
Start: 2017-07-31 | End: 2017-07-31 | Stop reason: HOSPADM

## 2017-07-31 RX ORDER — ISOSORBIDE MONONITRATE 30 MG/1
30 TABLET, EXTENDED RELEASE ORAL DAILY
Status: DISCONTINUED | OUTPATIENT
Start: 2017-07-31 | End: 2017-07-31 | Stop reason: HOSPADM

## 2017-07-31 RX ORDER — RAMELTEON 8 MG/1
8 TABLET ORAL NIGHTLY PRN
Status: DISCONTINUED | OUTPATIENT
Start: 2017-07-31 | End: 2017-07-31 | Stop reason: HOSPADM

## 2017-07-31 RX ORDER — METOPROLOL TARTRATE 50 MG/1
50 TABLET ORAL 2 TIMES DAILY
Qty: 60 TABLET | Refills: 0 | Status: ON HOLD | OUTPATIENT
Start: 2017-07-31 | End: 2018-04-03 | Stop reason: CLARIF

## 2017-07-31 RX ADMIN — PREGABALIN 100 MG: 50 CAPSULE ORAL at 08:07

## 2017-07-31 RX ADMIN — CLOPIDOGREL 75 MG: 75 TABLET, FILM COATED ORAL at 08:07

## 2017-07-31 RX ADMIN — MIDODRINE HYDROCHLORIDE 2.5 MG: 2.5 TABLET ORAL at 01:07

## 2017-07-31 RX ADMIN — POTASSIUM CITRATE 20 MEQ: 10 TABLET ORAL at 04:07

## 2017-07-31 RX ADMIN — MIDODRINE HYDROCHLORIDE 2.5 MG: 2.5 TABLET ORAL at 06:07

## 2017-07-31 RX ADMIN — SODIUM CHLORIDE: 0.9 INJECTION, SOLUTION INTRAVENOUS at 01:07

## 2017-07-31 RX ADMIN — POTASSIUM CITRATE 20 MEQ: 10 TABLET ORAL at 08:07

## 2017-07-31 RX ADMIN — ASPIRIN 81 MG: 81 TABLET, COATED ORAL at 08:07

## 2017-07-31 RX ADMIN — ISOSORBIDE MONONITRATE 30 MG: 30 TABLET, EXTENDED RELEASE ORAL at 08:07

## 2017-07-31 RX ADMIN — NITROGLYCERIN 0.4 MG: 0.4 TABLET SUBLINGUAL at 01:07

## 2017-07-31 RX ADMIN — METOPROLOL SUCCINATE 25 MG: 25 TABLET, EXTENDED RELEASE ORAL at 08:07

## 2017-07-31 NOTE — ASSESSMENT & PLAN NOTE
Episodic lightheadedness  - Continue home dosing of midodrine  - ambulate with assistance  - high risk for fall / injury utilize all safety measures and fall precautions

## 2017-07-31 NOTE — SUBJECTIVE & OBJECTIVE
"Past Medical History:   Diagnosis Date    Coronary artery disease 2006, 5/2013    s/p pci to rca (2006), LCx (5/2013)    DM (diabetes mellitus)     HLD (hyperlipidemia)     HTN (hypertension)     Kidney stone     EMELI (obstructive sleep apnea) 11/2/2015    Subclavian artery stenosis, left        Past Surgical History:   Procedure Laterality Date    BACK SURGERY      CHOLECYSTECTOMY  2011    CORONARY ANGIOPLASTY      CORONARY ARTERY BYPASS GRAFT  2000    5 bypass surgeries    TONSILLECTOMY         Review of patient's allergies indicates:   Allergen Reactions    Sulfa (sulfonamide antibiotics) Photosensitivity    Zocor [simvastatin] Other (See Comments)     lathargic       No current facility-administered medications on file prior to encounter.      Current Outpatient Prescriptions on File Prior to Encounter   Medication Sig    aspirin (ECOTRIN) 81 MG EC tablet Take 81 mg by mouth once daily.    BD ULTRA-FINE JERMAINE PEN NEEDLES 32 gauge x 5/32" Ndle     clopidogrel (PLAVIX) 75 mg tablet Take 600mg po X1 today and then 75mg po qday    CYANOCOBALAMIN, VITAMIN B-12, (VITAMIN B-12 ORAL) Take by mouth.    isosorbide mononitrate (IMDUR) 30 MG 24 hr tablet Take 1 tablet (30 mg total) by mouth once daily.    LYRICA 100 mg capsule 2 (two) times daily.     metoprolol succinate (TOPROL-XL) 25 MG 24 hr tablet Take 1 tablet (25 mg total) by mouth once daily.    midodrine (PROAMATINE) 2.5 MG Tab Take 1 tablet (2.5 mg total) by mouth 3 (three) times daily.    NOVOLOG 100 unit/mL injection     NOVOLOG FLEXPEN 100 unit/mL InPn pen as needed.     oxycodone-acetaminophen (PERCOCET)  mg per tablet Take 1 tablet by mouth every 6 (six) hours as needed for Pain.    rosuvastatin (CRESTOR) 40 MG Tab Take 1 tablet (40 mg total) by mouth every evening.    EASY TOUCH 31 X 5/16 " Ndle     sub-q insulin device, 20 unit Lashay by Misc.(Non-Drug; Combo Route) route.    SURE COMFORT INSULIN SYRINGE 1/2 mL 31 x 5/16" Syrg "     [DISCONTINUED] potassium citrate (UROCIT-K) 10 mEq (1,080 mg) TbSR Take 2 tablets (20 mEq total) by mouth 2 (two) times daily with meals.     Family History     Problem Relation (Age of Onset)    Heart attack Mother, Father    Heart disease Mother, Father, Paternal Uncle    Hypertension Brother        Social History Main Topics    Smoking status: Never Smoker    Smokeless tobacco: Never Used    Alcohol use No    Drug use: No    Sexual activity: Yes     Partners: Female     Review of Systems   Constitutional: Negative for chills and fever.   HENT: Negative for congestion.    Eyes: Negative for visual disturbance.   Respiratory: Positive for shortness of breath. Negative for cough and chest tightness.    Cardiovascular: Positive for chest pain. Negative for palpitations and leg swelling.   Gastrointestinal: Negative for abdominal distention, abdominal pain, diarrhea, nausea and vomiting.   Genitourinary: Negative for dysuria and hematuria.   Musculoskeletal: Negative for joint swelling.   Skin: Negative.  Negative for rash.   Neurological: Positive for light-headedness. Negative for seizures.        Near syncope      Objective:     Vital Signs (Most Recent):  Temp: 98.7 °F (37.1 °C) (07/31/17 0059)  Pulse: 77 (07/31/17 0059)  Resp: 16 (07/31/17 0059)  BP: 127/64 (07/31/17 0059)  SpO2: 96 % (07/31/17 0059) Vital Signs (24h Range):  Temp:  [97.8 °F (36.6 °C)-98.7 °F (37.1 °C)] 98.7 °F (37.1 °C)  Pulse:  [77-82] 77  Resp:  [12-21] 16  SpO2:  [94 %-96 %] 96 %  BP: (110-144)/(59-75) 127/64     Weight: 94.9 kg (209 lb 3.5 oz)  Body mass index is 28.37 kg/m².    Physical Exam   Constitutional: He is oriented to person, place, and time. He appears well-developed and well-nourished. No distress.   HENT:   Head: Normocephalic and atraumatic.   Eyes: EOM are normal. Pupils are equal, round, and reactive to light.   Neck: Normal range of motion. Neck supple.   Cardiovascular: Normal rate, regular rhythm, normal heart  sounds and intact distal pulses.    Pulmonary/Chest: Effort normal and breath sounds normal. No respiratory distress. He has no wheezes. He has no rales.   Abdominal: Soft. Bowel sounds are normal. He exhibits no distension. There is no tenderness.   Musculoskeletal: Normal range of motion. He exhibits no edema.   Neurological: He is alert and oriented to person, place, and time.   Skin: Skin is warm and dry. He is not diaphoretic.   Psychiatric: He has a normal mood and affect. His behavior is normal.   Nursing note and vitals reviewed.     Significant Labs:   CBC:   Recent Labs  Lab 07/30/17 2146   WBC 6.26   HGB 15.0   HCT 42.5        CMP:   Recent Labs  Lab 07/30/17 2146      K 4.3      CO2 23   *   BUN 19   CREATININE 1.4   CALCIUM 9.5   PROT 7.0   ALBUMIN 3.6   BILITOT 1.2*   ALKPHOS 95   AST 31   ALT 32   ANIONGAP 11   EGFRNONAA 52.4*     Cardiac Markers:   Recent Labs  Lab 07/30/17 2146   BNP 22     Troponin:   Recent Labs  Lab 07/30/17 2146   TROPONINI 0.010     All pertinent labs within the past 24 hours have been reviewed.    Significant Imaging: I have reviewed all pertinent imaging results/findings within the past 24 hours.

## 2017-07-31 NOTE — ED TRIAGE NOTES
Pt states that he had a stent placed Monday. Pt reports chest heaviness, SOB and indigestion x3 days. Pt reports taking 1 nitro at 1900 this evening, which relieved some of the heaviness. Pt rates pain at 4/10 at this time.

## 2017-07-31 NOTE — ASSESSMENT & PLAN NOTE
Coronary artery disease, Post PTCA, Hx of CABG  - cardiology consulted - appreciate rec's  - maintain NPO for now  - MIVF for gentle hydration overnight   - continue home medication regimen as directed  - NTG SL PRN   - trend troponin Q 6 x3  - telemetry throughout hospitalization

## 2017-07-31 NOTE — H&P
[facility] Hospital Medicine  History & Physical    Patient Name: Sherif Knutson Jr.  MRN: [id number]  Admission Date: 7/30/2017  Attending Physician: Kathleen Lopez MD   Primary Care Provider: Jose F Chicas MD    [facility] Medicine Team: [facility] HOSP MED C Aram Johnson NP     Patient information was obtained from patient and ER records.     Subjective:     Principal Problem:Chest pain    Chief Complaint:   Chief Complaint   Patient presents with    Chest Pain     Pt states that he had a stent placed Monday. Reports chest heaviness, SOB and indegestion x3 days. States that he took 1 nitro at home which relieved some of the chest heaviness.     Shortness of Breath        HPI: 67 y/o gentleman with a PMHx of CAD, CABG, HTN, HLD, DM, left subclavian artery stenosis s/p stenting, and kidney stones.  Who recently underwent outpatient cardiac cauterization on 7/24/2017 performed by Dr. Black.  He was noted to have 70% OM3 stenosis which was successful stented and he was discharged the same day.  He presents to the ED with C/O intermittent recurrent mild to moderate chest pressure with associated indigestion, chest heaviness, lightheadedness, and SOB since 7/27/2017. At this time he denies CP, SOB, N/V/D,  symptoms, fever, chills, or recent illness.  He states that he has required sublingual NTG daily since onset of symptoms.  He states that his symptoms are similar to those he had prior to stent placement except the intensity of his discomfort is increased.  He describes his discomfort as chest pressure which radiates to his back and left shoulder.  He states that with onset of pain it is worsened with movement or activity and improved with rest and NTG.  Initial troponin was 0.01.  His EKG revealed <1mm ST depressions inferior and lateral leads slightly more pronounced but was present on prior EKGs.  Cardiology was consulted and ECHO was performed suggested normal LVEF, slight LVH, no gross wall  "motion abnormalities, and no effusion.     Past Medical History:   Diagnosis Date    Coronary artery disease 2006, 5/2013    s/p pci to rca (2006), LCx (5/2013)    DM (diabetes mellitus)     HLD (hyperlipidemia)     HTN (hypertension)     Kidney stone     EMELI (obstructive sleep apnea) 11/2/2015    Subclavian artery stenosis, left        Past Surgical History:   Procedure Laterality Date    BACK SURGERY      CHOLECYSTECTOMY  2011    CORONARY ANGIOPLASTY      CORONARY ARTERY BYPASS GRAFT  2000    5 bypass surgeries    TONSILLECTOMY         Review of patient's allergies indicates:   Allergen Reactions    Sulfa (sulfonamide antibiotics) Photosensitivity    Zocor [simvastatin] Other (See Comments)     lathargic       No current facility-administered medications on file prior to encounter.      Current Outpatient Prescriptions on File Prior to Encounter   Medication Sig    aspirin (ECOTRIN) 81 MG EC tablet Take 81 mg by mouth once daily.    BD ULTRA-FINE JERMAINE PEN NEEDLES 32 gauge x 5/32" Ndle     clopidogrel (PLAVIX) 75 mg tablet Take 600mg po X1 today and then 75mg po qday    CYANOCOBALAMIN, VITAMIN B-12, (VITAMIN B-12 ORAL) Take by mouth.    isosorbide mononitrate (IMDUR) 30 MG 24 hr tablet Take 1 tablet (30 mg total) by mouth once daily.    LYRICA 100 mg capsule 2 (two) times daily.     metoprolol succinate (TOPROL-XL) 25 MG 24 hr tablet Take 1 tablet (25 mg total) by mouth once daily.    midodrine (PROAMATINE) 2.5 MG Tab Take 1 tablet (2.5 mg total) by mouth 3 (three) times daily.    NOVOLOG 100 unit/mL injection     NOVOLOG FLEXPEN 100 unit/mL InPn pen as needed.     oxycodone-acetaminophen (PERCOCET)  mg per tablet Take 1 tablet by mouth every 6 (six) hours as needed for Pain.    rosuvastatin (CRESTOR) 40 MG Tab Take 1 tablet (40 mg total) by mouth every evening.    EASY TOUCH 31 X 5/16 " Ndle     sub-q insulin device, 20 unit Lashay by Misc.(Non-Drug; Combo Route) route.    SURE " "COMFORT INSULIN SYRINGE 1/2 mL 31 x 5/16" Syrg     [DISCONTINUED] potassium citrate (UROCIT-K) 10 mEq (1,080 mg) TbSR Take 2 tablets (20 mEq total) by mouth 2 (two) times daily with meals.     Family History     Problem Relation (Age of Onset)    Heart attack Mother, Father    Heart disease Mother, Father, Paternal Uncle    Hypertension Brother        Social History Main Topics    Smoking status: Never Smoker    Smokeless tobacco: Never Used    Alcohol use No    Drug use: No    Sexual activity: Yes     Partners: Female     Review of Systems   Constitutional: Negative for chills and fever.   HENT: Negative for congestion.    Eyes: Negative for visual disturbance.   Respiratory: Positive for shortness of breath. Negative for cough and chest tightness.    Cardiovascular: Positive for chest pain. Negative for palpitations and leg swelling.   Gastrointestinal: Negative for abdominal distention, abdominal pain, diarrhea, nausea and vomiting.   Genitourinary: Negative for dysuria and hematuria.   Musculoskeletal: Negative for joint swelling.   Skin: Negative.  Negative for rash.   Neurological: Positive for light-headedness. Negative for seizures.        Near syncope      Objective:     Vital Signs (Most Recent):  Temp: 98.7 °F (37.1 °C) (07/31/17 0059)  Pulse: 77 (07/31/17 0059)  Resp: 16 (07/31/17 0059)  BP: 127/64 (07/31/17 0059)  SpO2: 96 % (07/31/17 0059) Vital Signs (24h Range):  Temp:  [97.8 °F (36.6 °C)-98.7 °F (37.1 °C)] 98.7 °F (37.1 °C)  Pulse:  [77-82] 77  Resp:  [12-21] 16  SpO2:  [94 %-96 %] 96 %  BP: (110-144)/(59-75) 127/64     Weight: 94.9 kg (209 lb 3.5 oz)  Body mass index is 28.37 kg/m².    Physical Exam   Constitutional: He is oriented to person, place, and time. He appears well-developed and well-nourished. No distress.   HENT:   Head: Normocephalic and atraumatic.   Eyes: EOM are normal. Pupils are equal, round, and reactive to light.   Neck: Normal range of motion. Neck supple. "   Cardiovascular: Normal rate, regular rhythm, normal heart sounds and intact distal pulses.    Pulmonary/Chest: Effort normal and breath sounds normal. No respiratory distress. He has no wheezes. He has no rales.   Abdominal: Soft. Bowel sounds are normal. He exhibits no distension. There is no tenderness.   Musculoskeletal: Normal range of motion. He exhibits no edema.   Neurological: He is alert and oriented to person, place, and time.   Skin: Skin is warm and dry. He is not diaphoretic.   Psychiatric: He has a normal mood and affect. His behavior is normal.   Nursing note and vitals reviewed.     Significant Labs:   CBC:   Recent Labs  Lab 07/30/17 2146   WBC 6.26   HGB 15.0   HCT 42.5        CMP:   Recent Labs  Lab 07/30/17 2146      K 4.3      CO2 23   *   BUN 19   CREATININE 1.4   CALCIUM 9.5   PROT 7.0   ALBUMIN 3.6   BILITOT 1.2*   ALKPHOS 95   AST 31   ALT 32   ANIONGAP 11   EGFRNONAA 52.4*     Cardiac Markers:   Recent Labs  Lab 07/30/17 2146   BNP 22     Troponin:   Recent Labs  Lab 07/30/17 2146   TROPONINI 0.010     All pertinent labs within the past 24 hours have been reviewed.    Significant Imaging: I have reviewed all pertinent imaging results/findings within the past 24 hours.    Assessment/Plan:     * Chest pain    Coronary artery disease, Post PTCA, Hx of CABG  - cardiology consulted - appreciate rec's  - maintain NPO for now  - MIVF for gentle hydration overnight   - continue home medication regimen as directed  - NTG SL PRN   - trend troponin Q 6 x3  - telemetry throughout hospitalization          Type 2 diabetes mellitus with diabetic peripheral angiopathy without gangrene, with long-term current use of insulin    - NPO of now   - low dose ISS  - HgA1C pending        Orthostatic hypotension    Episodic lightheadedness  - Continue home dosing of midodrine  - ambulate with assistance  - high risk for fall / injury utilize all safety measures and fall precautions         Dyslipidemia    - start low fat / cholesterol diet when appropriate   - continue home dosing of rosuvastatin           VTE Risk Mitigation         Ordered     enoxaparin injection 40 mg  Daily     Route:  Subcutaneous        07/31/17 0101     Medium Risk of VTE  Once      07/31/17 0101     Place FERMÍN hose  Until discontinued      07/31/17 0101     Place sequential compression device  Until discontinued      07/31/17 0101        Aram Johnson NP  Department of Hospital Medicine   [facility]

## 2017-07-31 NOTE — PLAN OF CARE
Problem: Patient Care Overview  Goal: Plan of Care Review  Outcome: Ongoing (interventions implemented as appropriate)  Patient is free of fall/trauma/injury. Denies CP, SOB, or pain/discomfort. Normal saline infusing at 75ml/hr per MD orders. Pt tolerating well. Plan of care discussed with pt. Pt verbalizes understanding. All questions addressed. Will continue to monitor

## 2017-07-31 NOTE — PLAN OF CARE
07/31/17 0956   Discharge Assessment   Assessment Type Discharge Planning Assessment   Confirmed/corrected address and phone number on facesheet? Yes   Assessment information obtained from? Patient;Medical Record   Expected Length of Stay (days) 1   Communicated expected length of stay with patient/caregiver yes   Prior to hospitilization cognitive status: Alert/Oriented   Prior to hospitalization functional status: Independent   Current cognitive status: Alert/Oriented   Current Functional Status: Independent   Arrived From home or self-care   Lives With spouse   Able to Return to Prior Arrangements yes   Is patient able to care for self after discharge? Yes   How many people do you have in your home that can help with your care after discharge? 1   Patient's perception of discharge disposition home or selfcare   Readmission Within The Last 30 Days no previous admission in last 30 days   Patient currently being followed by outpatient case management? No   Patient currently receives home health services? No   Does the patient currently use HME? No   Patient currently receives private duty nursing? No   Patient currently receives any other outside agency services? No   Equipment Currently Used at Home none   Do you have any problems affording any of your prescribed medications? No   Is the patient taking medications as prescribed? yes   Do you have any financial concerns preventing you from receiving the healthcare you need? No   Does the patient have transportation to healthcare appointments? Yes   Transportation Available car;family or friend will provide   On Dialysis? No   Does the patient receive services at the [facility]? No   Are there any open cases? No   Discharge Plan A Home with family   Patient/Family In Agreement With Plan yes   Placed in Obs for CP. Lives with wife and is independent in his ADLs. Plan is to DC home. No DC needs identified.

## 2017-07-31 NOTE — NURSING TRANSFER
Nursing Transfer Note      7/31/2017     Transfer To: 323    Transfer via stretcher    Transfer with cardiac monitoring    Transported by ED staff    Medicines sent: n/a    Chart send with patient: Yes    Notified: spouse    Patient reassessed at: 7/31/2017, 12:59 AM    Upon arrival to floor: cardiac monitor applied, patient oriented to room, call bell in reach and bed in lowest position

## 2017-07-31 NOTE — PROGRESS NOTES
Discussed case with Dr. Black, given no further chest pain overnight, troponin negative x 3 - will perform non-invasive stress test to determine if pain ischemic.    Pascual Alcala MD.

## 2017-07-31 NOTE — HPI
67 y/o gentleman with a PMHx of CAD, CABG, HTN, HLD, DM, left subclavian artery stenosis s/p stenting, and kidney stones.  Who recently underwent outpatient cardiac cauterization on 7/24/2017 performed by Dr. Black.  He was noted to have 70% OM3 stenosis which was successful stented and he was discharged the same day.  He presents to the ED with C/O intermittent recurrent mild to moderate chest pressure with associated indigestion, chest heaviness, lightheadedness, and SOB since 7/27/2017. At this time he denies CP, SOB, N/V/D,  symptoms, fever, chills, or recent illness.  He states that he has required sublingual NTG daily since onset of symptoms.  He states that his symptoms are similar to those he had prior to stent placement except the intensity of his discomfort is increased.  He describes his discomfort as chest pressure which radiates to his back and left shoulder.  He states that with onset of pain it is worsened with movement or activity and improved with rest and NTG.  Initial troponin was 0.01.  His EKG revealed <1mm ST depressions inferior and lateral leads slightly more pronounced but was present on prior EKGs.  Cardiology was consulted and ECHO was performed suggested normal LVEF, slight LVH, no gross wall motion abnormalities, and no effusion.

## 2017-07-31 NOTE — ED PROVIDER NOTES
Encounter Date: 7/30/2017    SCRIBE #1 NOTE: I, Geraldo Kessler, am scribing for, and in the presence of, Dr. Maddox.       History     Chief Complaint   Patient presents with    Chest Pain     Pt states that he had a stent placed Monday. Reports chest heaviness, SOB and indegestion x3 days. States that he took 1 nitro at home which relieved some of the chest heaviness.     Shortness of Breath     Time seen by provider: 9:34 PM    This is a 66 y.o. male with pertinent PMHx of CAD, HTN, HLD, DM, left subclavian artery stenosis, and kidney stones, who presents to the ED with a chief complaint of recurrent mild to moderate waxing and waning chest discomfort with associated indigestion, chest heaviness, lightheadedness, and shortness of breath for the past 3 days. Pt recently had angiogram and a stent placed ~1 week ago and had been doing well but now these recurrent symptoms are the same symptoms he was experimenting prior to the stent placement. Pt states he has been taking 2 doses of NTG per day for these symptoms and it has been helping and that he called the nurse for the surgeon who did his angiogram and was instructed to present to the ED. Pt states that he has taken 1 NTG today and currently reports very mild chest discomfort and mild shortness of breath. Pt denies any leg pain or swelling. There are no other associated symptoms or mitigating/aggravating factors reported at this time.           Review of patient's allergies indicates:   Allergen Reactions    Sulfa (sulfonamide antibiotics) Photosensitivity    Zocor [simvastatin] Other (See Comments)     lathargic     Past Medical History:   Diagnosis Date    Coronary artery disease 2006, 5/2013    s/p pci to rca (2006), LCx (5/2013)    DM (diabetes mellitus)     HLD (hyperlipidemia)     HTN (hypertension)     Kidney stone     EMELI (obstructive sleep apnea) 11/2/2015    Subclavian artery stenosis, left      Past Surgical History:   Procedure Laterality  Date    BACK SURGERY      CHOLECYSTECTOMY  2011    CORONARY ANGIOPLASTY      CORONARY ARTERY BYPASS GRAFT  2000    5 bypass surgeries    TONSILLECTOMY       Family History   Problem Relation Age of Onset    Heart disease Mother     Heart attack Mother     Heart disease Father     Heart attack Father     Hypertension Brother     Heart disease Paternal Uncle     Prostate cancer Neg Hx     Kidney disease Neg Hx      Social History   Substance Use Topics    Smoking status: Never Smoker    Smokeless tobacco: Never Used    Alcohol use No     Review of Systems   Constitutional: Negative for chills and fever.   HENT: Negative for congestion.    Eyes: Negative for pain.   Respiratory: Positive for shortness of breath. Negative for cough.    Cardiovascular: Positive for chest pain (discomfort and heaviness). Negative for leg swelling.   Gastrointestinal: Negative for abdominal pain, nausea and vomiting.   Genitourinary: Negative for difficulty urinating and dysuria.   Musculoskeletal: Negative for back pain and neck pain.   Skin: Negative for rash.   Neurological: Positive for light-headedness. Negative for weakness and headaches.       Physical Exam     Initial Vitals [07/30/17 2117]   BP Pulse Resp Temp SpO2   129/62 82 18 97.8 °F (36.6 °C) 95 %      MAP       84.33         Physical Exam    Nursing note and vitals reviewed.  Constitutional: He appears well-developed and well-nourished. He is not diaphoretic. No distress.   HENT:   Head: Normocephalic and atraumatic.   Eyes: EOM are normal. Pupils are equal, round, and reactive to light.   Neck: Normal range of motion. Neck supple.   Cardiovascular: Normal rate and regular rhythm. Exam reveals no gallop and no friction rub.    No murmur heard.  Pulmonary/Chest: Breath sounds normal. No respiratory distress. He has no wheezes. He has no rhonchi. He has no rales.   Abdominal: Soft. He exhibits no distension. There is no tenderness. There is no rebound and no  guarding.   Musculoskeletal: Normal range of motion. He exhibits no edema or tenderness.   Neurological: He is alert and oriented to person, place, and time. He has normal strength. No cranial nerve deficit or sensory deficit.   Skin: Skin is warm and dry. No rash noted. No erythema.   Psychiatric: He has a normal mood and affect. His behavior is normal. Judgment and thought content normal.         ED Course   Procedures  Labs Reviewed   COMPREHENSIVE METABOLIC PANEL - Abnormal; Notable for the following:        Result Value    Glucose 132 (*)     Total Bilirubin 1.2 (*)     eGFR if non  52.4 (*)     All other components within normal limits   CBC W/ AUTO DIFFERENTIAL   B-TYPE NATRIURETIC PEPTIDE   TROPONIN I     EKG Readings: (Independently Interpreted)   NSR, T wave inversions laterally and inferiorly. No significant change from prior.        X-Rays:   Independently Interpreted Readings:   Chest X-Ray: no acute process      Medical Decision Making:   History:   Old Medical Records: I decided to obtain old medical records.  Initial Assessment:   My initial differential diagnoses include but are not limited to ACS, CHF, pneumonia, and stent malfunction. This is a 66 y.o. male who presents with chest discomfort and shortness of breath s/p catheterization and stent placement. Will preform cardiac workup and likely admit for further cardiac evaluation.   Independently Interpreted Test(s):   I have ordered and independently interpreted X-rays - see prior notes.  I have ordered and independently interpreted EKG Reading(s) - see prior notes  Clinical Tests:   Lab Tests: Ordered and Reviewed  Radiological Study: Ordered and Reviewed  Medical Tests: Ordered and Reviewed  Other:   I have discussed this case with another health care provider.            Scribe Attestation:   Scribe #1: I performed the above scribed service and the documentation accurately describes the services I performed. I attest to the  accuracy of the note.    Attending Attestation:           Physician Attestation for Scribe:  Physician Attestation Statement for Scribe #1: I, Dr. Maddox, reviewed documentation, as scribed by Geraldo Kessler in my presence, and it is both accurate and complete.         Attending ED Notes:   11:45 pm  Cardiac workup was negative. Case discussed with cardiology who will admit for further management.           ED Course     Clinical Impression:   The primary encounter diagnosis was Coronary artery disease. 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    Disposition:   Disposition: Placed in Observation                        Lalo Maddox III, MD  07/31/17 0994

## 2017-07-31 NOTE — CARE UPDATE
Cardiology Consults follow-up note  For more detail see original consult note from this morning.    Mr. Knutson is a 66 y.o. Man with coronary artery disease s/p CABG in 2000 (only LIMA currently patent), s/p PCI RCA 2006, LCx 2013, OM3 2017, subclavian stenosis s/p stent, HTN, HLD, and DM.     Chest pain  - Will need ischemic workout. Unable to do as inpatient until Wednesday. Pt currently stable and without pain. Plan for outpatient PET stress test. Discussed with Dr. Black and Dr. Patiño.  - recommend increased beta blockade with HR goal 60s. Switch to metoprolol tartrate 50 PO BID.  - recommend increase dose of imdur prior to discharge.     Case seen and discussed with Dr. Christianson.  Leonor Irving PGY-2

## 2017-07-31 NOTE — CONSULTS
[facility]  Cardiology Consult Note    Attending Physician: Lalo Maddox III, MD  Reason for Consult: Chest pain    HPI:   Mr. Knutson is a 66 y.o. Man with coronary artery disease s/p CABG in 2000 (only LIMA currently patent), s/p PCI RCA 2006, LCx 2013, OM3 2017, subclavian stenosis s/p stent, HTN, HLD, and DM.  He was recently discharged this past week after PCI to 3 with Dr. Black for CCS III angina.  He reports angina resolved after discharge and he was doing well at home until this past Friday when again he began to develop chest pain - both with exertion and with rest, substernal aching, similar to prior symptoms, relieved with nitroglycerin.  No palpitations, no orthopnea, pain is not positional, no cough/fever, no lower extremity edema.  He reports compliance with medications.  Pain possibly worse with inspiration then other times not related to inspiration.      Mr. Knutson was seen in ED, /62, HR 82, chest pain mostly resolved prior to admission, EKG with ST-changes slightly more pronounced on current EKG however some changes present on prior EKGs.  Otherwise labs within normal limits, troponin negative.  Bedside echo without effusion or wall motion abnormalities.    Review of Systems   Review of Systems   Constitution: Negative.   HENT: Negative.    Eyes: Negative.    Cardiovascular: Positive for chest pain. Negative for claudication, dyspnea on exertion, irregular heartbeat, leg swelling, near-syncope, orthopnea, palpitations and syncope.   Respiratory: Negative.    Skin: Negative.    Musculoskeletal: Negative.    Gastrointestinal: Negative.    Genitourinary: Negative.    Psychiatric/Behavioral: Negative.          PMH:     Past Medical History:   Diagnosis Date    Coronary artery disease 2006, 5/2013    s/p pci to rca (2006), LCx (5/2013)    DM (diabetes mellitus)     HLD (hyperlipidemia)     HTN (hypertension)     Kidney stone     EMELI (obstructive sleep apnea) 11/2/2015     "Subclavian artery stenosis, left      Past Surgical History:   Procedure Laterality Date    BACK SURGERY      CHOLECYSTECTOMY  2011    CORONARY ANGIOPLASTY      CORONARY ARTERY BYPASS GRAFT  2000    5 bypass surgeries    TONSILLECTOMY          Allergies:     Review of patient's allergies indicates:   Allergen Reactions    Sulfa (sulfonamide antibiotics) Photosensitivity    Zocor [simvastatin] Other (See Comments)     lathargic        Medications:     No current facility-administered medications on file prior to encounter.      Current Outpatient Prescriptions on File Prior to Encounter   Medication Sig Dispense Refill    aspirin (ECOTRIN) 81 MG EC tablet Take 81 mg by mouth once daily.      BD ULTRA-FINE JERMAINE PEN NEEDLES 32 gauge x 5/32" Ndle       clopidogrel (PLAVIX) 75 mg tablet Take 600mg po X1 today and then 75mg po qday 30 tablet 11    CYANOCOBALAMIN, VITAMIN B-12, (VITAMIN B-12 ORAL) Take by mouth.      EASY TOUCH 31 X 5/16 " Ndle       isosorbide mononitrate (IMDUR) 30 MG 24 hr tablet Take 1 tablet (30 mg total) by mouth once daily. 30 tablet 11    LYRICA 100 mg capsule 2 (two) times daily.       metoprolol succinate (TOPROL-XL) 25 MG 24 hr tablet Take 1 tablet (25 mg total) by mouth once daily. 30 tablet 5    midodrine (PROAMATINE) 2.5 MG Tab Take 1 tablet (2.5 mg total) by mouth 3 (three) times daily. 90 tablet 11    NOVOLOG 100 unit/mL injection       NOVOLOG FLEXPEN 100 unit/mL InPn pen as needed.       oxycodone-acetaminophen (PERCOCET)  mg per tablet Take 1 tablet by mouth every 6 (six) hours as needed for Pain. 40 tablet 0    potassium citrate (UROCIT-K) 10 mEq (1,080 mg) TbSR Take 2 tablets (20 mEq total) by mouth 2 (two) times daily with meals. 120 tablet 11    rosuvastatin (CRESTOR) 40 MG Tab Take 1 tablet (40 mg total) by mouth every evening. 30 tablet 6    sub-q insulin device, 20 unit [place]ashay by Misc.(Non-Drug; Combo Route) route.      SURE COMFORT INSULIN SYRINGE 1/2 " "mL 31 x 5/16" Lissette           Social History:     Social History   Substance Use Topics    Smoking status: Never Smoker    Smokeless tobacco: Never Used    Alcohol use No        Family History:     Family History   Problem Relation Age of Onset    Heart disease Mother     Heart attack Mother     Heart disease Father     Heart attack Father     Hypertension Brother     Heart disease Paternal Uncle     Prostate cancer Neg Hx     Kidney disease Neg Hx         Physical Exam:     Vitals:  Temp:  [97.8 °F (36.6 °C)]   Pulse:  [78-82]   Resp:  [12-21]   BP: (110-144)/(59-75)   SpO2:  [94 %-95 %]   I/O's:  No intake or output data in the 24 hours ending 07/31/17 0022     Physical Exam   Constitutional: He is oriented to person, place, and time. He appears well-developed and well-nourished. No distress.   HENT:   Head: Normocephalic and atraumatic.   Mouth/Throat: No oropharyngeal exudate.   Eyes: EOM are normal. Pupils are equal, round, and reactive to light. No scleral icterus.   Neck: Normal range of motion. Neck supple. No JVD present.   Cardiovascular: Normal rate and regular rhythm.  Exam reveals no gallop and no friction rub.    No murmur heard.  Pulmonary/Chest: Effort normal and breath sounds normal. No respiratory distress. He has no wheezes.   Midline sternotomy noted   Abdominal: Soft. He exhibits no distension. There is no tenderness.   Musculoskeletal: Normal range of motion. He exhibits no edema.   Neurological: He is alert and oriented to person, place, and time.   Skin: Skin is warm and dry. He is not diaphoretic. No erythema.   Psychiatric: He has a normal mood and affect.     Labs:     Recent Results (from the past 336 hour(s))   CBC auto differential    Collection Time: 07/30/17  9:46 PM   Result Value Ref Range    WBC 6.26 3.90 - 12.70 K/uL    Hemoglobin 15.0 14.0 - 18.0 g/dL    Hematocrit 42.5 40.0 - 54.0 %    Platelets 158 150 - 350 K/uL   CBC auto differential    Collection Time: 07/24/17  " 7:28 AM   Result Value Ref Range    WBC 7.98 3.90 - 12.70 K/uL    Hemoglobin 15.0 14.0 - 18.0 g/dL    Hematocrit 43.5 40.0 - 54.0 %    Platelets 155 150 - 350 K/uL   CBC auto differential    Collection Time: 07/18/17  3:02 PM   Result Value Ref Range    WBC 6.40 3.90 - 12.70 K/uL    Hemoglobin 14.7 14.0 - 18.0 g/dL    Hematocrit 43.8 40.0 - 54.0 %    Platelets 153 150 - 350 K/uL       Recent Results (from the past 336 hour(s))   Basic metabolic panel    Collection Time: 07/24/17  7:28 AM   Result Value Ref Range    Sodium 138 136 - 145 mmol/L    Potassium 4.0 3.5 - 5.1 mmol/L    Chloride 106 95 - 110 mmol/L    CO2 22 (L) 23 - 29 mmol/L    BUN, Bld 16 8 - 23 mg/dL    Creatinine 1.2 0.5 - 1.4 mg/dL    Calcium 8.8 8.7 - 10.5 mg/dL    Anion Gap 10 8 - 16 mmol/L   Basic metabolic panel    Collection Time: 07/18/17  3:02 PM   Result Value Ref Range    Sodium 139 136 - 145 mmol/L    Potassium 4.9 3.5 - 5.1 mmol/L    Chloride 105 95 - 110 mmol/L    CO2 29 23 - 29 mmol/L    BUN, Bld 17 8 - 23 mg/dL    Creatinine 1.5 (H) 0.5 - 1.4 mg/dL    Calcium 9.3 8.7 - 10.5 mg/dL    Anion Gap 5 (L) 8 - 16 mmol/L       Lab Results   Component Value Date    CHOL 115 (L) 10/20/2016    HDL 46 10/20/2016    LDLCALC 41.6 (L) 10/20/2016    TRIG 137 10/20/2016         Recent Labs  Lab 07/30/17  2146   TROPONINI 0.010       Lab Results   Component Value Date    HGBA1C 7.7 (H) 06/29/2014       Estimated Creatinine Clearance: 63 mL/min (based on Cr of 1.4).    Imaging:  CXR no consolidations, normal cardiac silhouette     Echo:  Normal LVEF, slight LVH, no gross wall motion abnormalities, no effusion.    EKG:   <1mm ST depressions inferior and lateral leads slightly more pronounced but present on prior EKGs      Assessment & Recommendations:     Mr. Knutson is a 66 y.o. Man with coronary artery disease s/p CABG in 2000 (only LIMA currently patent), s/p PCI RCA 2006, LCx 2013, OM3 2017, subclavian stenosis s/p stent, HTN, HLD, and DM.     #Chest pain  - differential includes post-cardiac injury syndrome s/p PCI last week vs. Recurrent angina vs. Non-cardiac chest pain.  Currently still with some residual symptoms but reports chest pain almost entirely gone, would likely benefit from one NTG to determine if chest pain can be completely resolved.  Troponin negative, EKG as above - if chest pain resolves completely would not treat as ACS given other potential etiologies.  If chest pain does not resolve please call cardiology fellow on call for potential escalation of care and complete ACS treatment.  Given normal cardiac enzymes currently if serial troponin elevated would treat as ACS as well.  Will discuss with interventional in AM as to whether they would like consider repeat angiogram vs. continued observation, keep NPO at midnight.    Thank you for this consult.    Signed:  Pascual Alcala M.D.  Cardiology Fellow  Pager: [phone]  7/31/2017 12:22 AM    Attending Addendum:

## 2017-07-31 NOTE — ED NOTES
Pain: chest heaviness x 1 day. Pt states feels like indigestion     Psychosocial: Patient is calm and cooperative. Patients insight and judgement are appropriate to situation. Appears clean, well maintained, with clothing appropriate to environment. No evidence of delusions, hallucinations, or psychosis.    Neuro: Eyes open spontaneously. Awake, alert, oriented x 4. Speech clear and appropriate. Tolerating saliva secretions well. Able to follow commands, demonstrating ability to actively and appropriately communicate within context of current conversation. Symmetrical facial muscles. Moving all extremities well with no noted weakness. Adequate muscle tone present. Movement is purposeful. No evidence of impaired sensation. Responds to external stimuli with appropriate reflexes.     Airway: Bilateral chest rise and fall. RR regular and non-labored. Air entry patent and clear x 5 lobes of the lungs. No crepitus or subcutaneous emphysema noted on palpation.     Circulatory: Skin warm, dry, and pink. Apical and radial pulses strong and regular. Capillary refill/skin blanching less than 3 seconds to distal of 4 extremities. Placed on CM in NSR without ectopy.    Abdomen: Abdomen obese, soft and non-distended. Positive normo-active bowel sounds x 4 quadrants.     Urinary: Patient reports routine urination without pain, frequency, or urgency. Voids independently. Reports urine appears mehul/yellow in color.    Extremities: No redness, heat, swelling, deformity, or pain.     Skin: Intact with no bruising/discolorations noted.

## 2017-08-01 NOTE — PLAN OF CARE
08/01/17 0710   Final Note   Assessment Type Final Discharge Note   Discharge Disposition Home   Hospital Follow Up  Appt(s) scheduled? Yes

## 2017-08-02 NOTE — DISCHARGE SUMMARY
"[facility] Hospital Medicine  Discharge Summary      Patient Name: Sherif Knutson Jr.  MRN: [id number]  Admission Date: 7/30/2017  Hospital Length of Stay: 0 days  Discharge Date and Time: 7/31/2017  5:00 PM  Attending Physician: Juliana Patiño MD  Discharging Provider: Juliana Patiño MD  Primary Care Provider: Jose F Chicas MD    [facility] Medicine Team: [facility] HOSP MED  Juliana Patiño MD    HPI: Per Alex NP, "65 y/o gentleman with a PMHx of CAD, CABG, HTN, HLD, DM, left subclavian artery stenosis s/p stenting, and kidney stones.  Who recently underwent outpatient cardiac cauterization on 7/24/2017 performed by Dr. Black.  He was noted to have 70% OM3 stenosis which was successful stented and he was discharged the same day.  He presents to the ED with C/O intermittent recurrent mild to moderate chest pressure with associated indigestion, chest heaviness, lightheadedness, and SOB since 7/27/2017. At this time he denies CP, SOB, N/V/D,  symptoms, fever, chills, or recent illness.  He states that he has required sublingual NTG daily since onset of symptoms.  He states that his symptoms are similar to those he had prior to stent placement except the intensity of his discomfort is increased.  He describes his discomfort as chest pressure which radiates to his back and left shoulder.  He states that with onset of pain it is worsened with movement or activity and improved with rest and NTG.  Initial troponin was 0.01.  His EKG revealed <1mm ST depressions inferior and lateral leads slightly more pronounced but was present on prior EKGs.  Cardiology was consulted and ECHO was performed suggested normal LVEF, slight LVH, no gross wall motion abnormalities, and no effusion."    Hospital Course: Admitted to [facility] overnight on Sunday 7/30, and I met him on Monday 7/31. Chest pain resolved.  Troponin negative x 3. Dr Christianson (cardiology staff) spoke with Dr Black, and patient needs a PET stress.  This will not be " able to be done until Wednesday. He was stable and without pain. Plan for outpatient PET stress test.  Increased beta blockade with HR goal 60s - metoprolol tartrate 50 PO BID. Increased imdur prior to d/c as well.     Other A/P:  Type 2 diabetes mellitus with diabetic peripheral angiopathy without gangrene, with long-term current use of insulin. Uncontrolled - A1C 8.6     - SSI and FS while NPO       Orthostatic hypotension     Episodic lightheadedness  - Continue home dosing of midodrine  - ambulate with assistance  - high risk for fall / injury utilize all safety measures and fall precautions       Dyslipidemia     - home crestor       * No surgery found *      Indwelling Lines/Drains at time of discharge:   Lines/Drains/Airways          No matching active lines, drains, or airways          Consults:   Consults         Status Ordering Provider     Inpatient consult to Cardiology  Once     Provider:  (Not yet assigned)    Completed ELISABETH, TESS III          Significant Diagnostic Studies: see above    Pending Diagnostic Studies:     None        Final Active Diagnoses:    Diagnosis Date Noted POA    PRINCIPAL PROBLEM:  Chest pain [R07.9] 07/30/2017 Yes    Episodic lightheadedness [R42] 03/17/2016 Yes    Post PTCA [Z98.61] 11/02/2015 Not Applicable     Chronic    Orthostatic hypotension [I95.1] 07/14/2014 Yes    Type 2 diabetes mellitus with diabetic peripheral angiopathy without gangrene, with long-term current use of insulin [E11.51, Z79.4] 06/28/2014 Not Applicable    Dyslipidemia [E78.5] 07/25/2013 Yes    Coronary artery disease [I25.10] 07/02/2013 Yes    Hx of CABG [Z95.1] 07/02/2013 Yes      Problems Resolved During this Admission:    Diagnosis Date Noted Date Resolved POA      Discharged Condition: stable    Disposition: Home or Self Care    Follow Up:    Patient Instructions:     Diet Diabetic 1800 Calories     Diet Cardiac     Activity as tolerated     Call MD for:  increased confusion or weakness  "    Call MD for:  persistent dizziness, light-headedness, or visual disturbances     Call MD for:  severe uncontrolled pain     Call MD for:  persistent nausea and vomiting or diarrhea     Call MD for:  temperature >100.4     Call MD for:  difficulty breathing or increased cough     Cardiac PET Scan Stress   Standing Status: Future  Standing Exp. Date: 07/31/18       Medications:  Reconciled Home Medications:   Discharge Medication List as of 7/31/2017  4:29 PM      START taking these medications    Details   metoprolol tartrate (LOPRESSOR) 50 MG tablet Take 1 tablet (50 mg total) by mouth 2 (two) times daily., Starting Mon 7/31/2017, Until Wed 8/30/2017, Normal      nitroGLYCERIN (NITROSTAT) 0.4 MG SL tablet Place 1 tablet (0.4 mg total) under the tongue every 5 (five) minutes as needed for Chest pain., Starting Mon 7/31/2017, Until Tue 7/31/2018, Normal         CONTINUE these medications which have CHANGED    Details   isosorbide mononitrate (IMDUR) 60 MG 24 hr tablet Take 1 tablet (60 mg total) by mouth once daily., Starting Mon 7/31/2017, Until Wed 8/30/2017, Normal         CONTINUE these medications which have NOT CHANGED    Details   aspirin (ECOTRIN) 81 MG EC tablet Take 81 mg by mouth once daily., Until Discontinued, Historical Med      BD ULTRA-FINE JERMAINE PEN NEEDLES 32 gauge x 5/32" Ndle Starting 9/17/2016, Until Discontinued, Historical Med      clopidogrel (PLAVIX) 75 mg tablet Take 600mg po X1 today and then 75mg po qday, Normal      CYANOCOBALAMIN, VITAMIN B-12, (VITAMIN B-12 ORAL) Take by mouth., Until Discontinued, Historical Med      EASY TOUCH 31 X 5/16 " Ndle Starting 8/14/2013, Until Discontinued, Historical Med      liraglutide 0.6 mg/0.1 mL, 18 mg/3 mL, subq PNIJ (VICTOZA 3-ANDRE) 0.6 mg/0.1 mL (18 mg/3 mL) PnIj Inject 1.8 mg into the skin once daily., Historical Med      LYRICA 100 mg capsule 2 (two) times daily. , Starting 2/19/2016, Until Discontinued, Historical Med      midodrine (PROAMATINE) " "2.5 MG Tab Take 1 tablet (2.5 mg total) by mouth 3 (three) times daily., Starting 10/20/2016, Until Fri 10/20/17, Normal      NOVOLOG 100 unit/mL injection Starting 2/24/2017, Until Discontinued, Historical Med      NOVOLOG FLEXPEN 100 unit/mL InPn pen as needed. , Starting 3/7/2016, Until Discontinued, Historical Med      oxycodone-acetaminophen (PERCOCET)  mg per tablet Take 1 tablet by mouth every 6 (six) hours as needed for Pain., Starting 1/20/2017, Until Discontinued, Normal      rosuvastatin (CRESTOR) 40 MG Tab Take 1 tablet (40 mg total) by mouth every evening., Starting 2/28/2014, Until Discontinued, Normal      sub-q insulin device, 20 unit Lashay by Misc.(Non-Drug; Combo Route) route., Until Discontinued, Historical Med      SURE COMFORT INSULIN SYRINGE 1/2 mL 31 x 5/16" Syrg Starting 6/14/2013, Until Discontinued, Historical Med         STOP taking these medications       metoprolol succinate (TOPROL-XL) 25 MG 24 hr tablet Comments:   Reason for Stopping:         potassium citrate (UROCIT-K) 10 mEq (1,080 mg) TbSR Comments:   Reason for Stopping:             Time spent on the discharge of patient: 45 minutes    Juliana Patiño MD  Department of Hospital Medicine  [facility]    "

## 2017-08-14 ENCOUNTER — CLINICAL SUPPORT (OUTPATIENT)
Dept: CARDIOLOGY | Facility: CLINIC | Age: 67
End: 2017-08-14
Payer: MEDICARE

## 2017-08-14 DIAGNOSIS — I25.10 CORONARY ARTERY DISEASE: ICD-10-CM

## 2017-08-14 LAB — DIASTOLIC DYSFUNCTION: NO

## 2017-08-14 PROCEDURE — A9555 RB82 RUBIDIUM: HCPCS | Mod: S$GLB,,, | Performed by: INTERNAL MEDICINE

## 2017-08-14 PROCEDURE — 78492 MYOCRD IMG PET MLT RST&STRS: CPT | Mod: S$GLB,,, | Performed by: INTERNAL MEDICINE

## 2017-08-14 PROCEDURE — 93015 CV STRESS TEST SUPVJ I&R: CPT | Mod: S$GLB,,, | Performed by: INTERNAL MEDICINE

## 2017-08-24 ENCOUNTER — TELEPHONE (OUTPATIENT)
Dept: CARDIOLOGY | Facility: CLINIC | Age: 67
End: 2017-08-24

## 2017-08-24 DIAGNOSIS — I50.9 ACUTE CONGESTIVE HEART FAILURE, UNSPECIFIED CONGESTIVE HEART FAILURE TYPE: Primary | ICD-10-CM

## 2017-08-24 RX ORDER — FUROSEMIDE 20 MG/1
20 TABLET ORAL DAILY
Qty: 30 TABLET | Refills: 6 | Status: SHIPPED | OUTPATIENT
Start: 2017-08-24 | End: 2018-08-20 | Stop reason: SDUPTHER

## 2017-08-24 NOTE — TELEPHONE ENCOUNTER
I called the patient with the results of his PET stress test which was ordered by hospital medicine.  He has ischemia in the territory of some very small caliber diagonal branches.  However he has not experienced angina since hospital discharge.  He has however been experiencing orthopnea and PND such that he has had to sleep in a recliner for the last 3-4 weeks.  He denies LE edema.  -will add lasix 20mg po qday  -check BMP in 1 week  -get TTE wto evaluate LEVF  ,All of the patient's questions were answered.

## 2017-08-28 ENCOUNTER — TELEPHONE (OUTPATIENT)
Dept: RESEARCH | Facility: HOSPITAL | Age: 67
End: 2017-08-28

## 2017-08-29 ENCOUNTER — HOSPITAL ENCOUNTER (OUTPATIENT)
Dept: CARDIOLOGY | Facility: CLINIC | Age: 67
Discharge: HOME | End: 2017-08-29
Payer: MEDICARE

## 2017-08-29 ENCOUNTER — OFFICE VISIT (OUTPATIENT)
Dept: CARDIOLOGY | Facility: CLINIC | Age: 67
End: 2017-08-29
Payer: MEDICARE

## 2017-08-29 VITALS
HEIGHT: 72 IN | BODY MASS INDEX: 28.31 KG/M2 | HEART RATE: 72 BPM | SYSTOLIC BLOOD PRESSURE: 112 MMHG | DIASTOLIC BLOOD PRESSURE: 64 MMHG | WEIGHT: 209 LBS

## 2017-08-29 DIAGNOSIS — I50.9 ACUTE CONGESTIVE HEART FAILURE, UNSPECIFIED CONGESTIVE HEART FAILURE TYPE: ICD-10-CM

## 2017-08-29 DIAGNOSIS — E11.51 TYPE 2 DIABETES MELLITUS WITH DIABETIC PERIPHERAL ANGIOPATHY WITHOUT GANGRENE, WITH LONG-TERM CURRENT USE OF INSULIN: Chronic | ICD-10-CM

## 2017-08-29 DIAGNOSIS — I77.1 SUBCLAVIAN ARTERIAL STENOSIS: ICD-10-CM

## 2017-08-29 DIAGNOSIS — Z79.4 TYPE 2 DIABETES MELLITUS WITH DIABETIC PERIPHERAL ANGIOPATHY WITHOUT GANGRENE, WITH LONG-TERM CURRENT USE OF INSULIN: Chronic | ICD-10-CM

## 2017-08-29 DIAGNOSIS — I25.10 CORONARY ARTERY DISEASE INVOLVING NATIVE CORONARY ARTERY OF NATIVE HEART WITHOUT ANGINA PECTORIS: Primary | Chronic | ICD-10-CM

## 2017-08-29 DIAGNOSIS — E66.3 OVERWEIGHT (BMI 25.0-29.9): ICD-10-CM

## 2017-08-29 DIAGNOSIS — Z95.1 HX OF CABG: ICD-10-CM

## 2017-08-29 DIAGNOSIS — Z98.61 POST PTCA: Chronic | ICD-10-CM

## 2017-08-29 DIAGNOSIS — G47.33 OSA (OBSTRUCTIVE SLEEP APNEA): Chronic | ICD-10-CM

## 2017-08-29 DIAGNOSIS — E78.5 DYSLIPIDEMIA: Chronic | ICD-10-CM

## 2017-08-29 PROBLEM — R07.9 CHEST PAIN: Status: RESOLVED | Noted: 2017-07-30 | Resolved: 2017-08-29

## 2017-08-29 PROCEDURE — 3045F PR MOST RECENT HEMOGLOBIN A1C LEVEL 7.0-9.0%: CPT | Mod: S$GLB,,, | Performed by: NURSE PRACTITIONER

## 2017-08-29 PROCEDURE — 93306 TTE W/DOPPLER COMPLETE: CPT | Mod: S$GLB,,, | Performed by: INTERNAL MEDICINE

## 2017-08-29 PROCEDURE — 99214 OFFICE O/P EST MOD 30 MIN: CPT | Mod: S$GLB,,, | Performed by: NURSE PRACTITIONER

## 2017-08-29 PROCEDURE — 3078F DIAST BP <80 MM HG: CPT | Mod: S$GLB,,, | Performed by: NURSE PRACTITIONER

## 2017-08-29 PROCEDURE — 3008F BODY MASS INDEX DOCD: CPT | Mod: S$GLB,,, | Performed by: NURSE PRACTITIONER

## 2017-08-29 PROCEDURE — 99999 PR PBB SHADOW E&M-EST. PATIENT-LVL IV: CPT | Mod: PBBFAC,,, | Performed by: NURSE PRACTITIONER

## 2017-08-29 PROCEDURE — 3074F SYST BP LT 130 MM HG: CPT | Mod: S$GLB,,, | Performed by: NURSE PRACTITIONER

## 2017-08-29 NOTE — PROGRESS NOTES
Mr. Knutson is a patient of Dr. Allen and was last seen in [facility] Cardiology 7/18/2017.    Subjective:   Patient ID:  Sherif Knutson Jr. is a 66 y.o. male who presents for follow-up of Follow-up    Problems:  CAD s/p CABGx5 in 2000; PCI to RCA (2006), LCx (2013), OM3 (7/24/17)  Subclavian artery stenosis, left  HLD  HTN  EMELI  DM type II, uncontrolled    HPI  Mr. Knutson is in clinic today for follow up after PCI.  Patient denies chest pain with exertion or at rest, palpitations, SOB, GLEZ, dizziness, syncope, edema, orthopnea, PND, or claudication.  He had GLEZ, orthopnea, and SOB and Dr. Black ordered 20mg lasix on 8/24/17.  States feeling much better and no longer having to sleep in the recliner.  He has a repeat echo this afternoon and a bmp scheduled for Thursday.  He has some small vessel disease that was seen on PET scan affecting about 10% of the myocardium that will be medically managed.  He was started on imdur for the stable angina and reports no angina since the increase to 60mg.  Denies headaches as long as he takes the Imdur at night. CAD is treated with low dose ASA and high intensity statin. He is taking DAPT for the stent placed in the OM3 on 7/24/17.  Denies bleeding gums, epistaxis, hematuria, and hematochezia.  He has the paperwork for cardiac rehab and has plans to start next week at [facility].  Hyperlipidemia is treated with rosuvastatin 40mg and LDL is 41.   Patient has history of obstructive sleep apnea.  Reports not using of CPAP machine and reports associated sx of EMELI. His last sleep evaluation was over a year ago.  Last echo 7/30/17 revealed normal systolic function (EF 60-65%) and indeterminant diastolic function.  No valvular disease was noted.     8/14/17 PET stress: CONCLUSIONS: ABNORMAL MYOCARDIAL PERFUSION PET STRESS TEST  1. There is a mild ischemia in the mid to distal anterior and anterolateral walls in the usual distribution of the D2 +/- D1. This defect comprises 10 % of the left  ventricular myocardium.  Absolute flow and CFR were calculated for all regions of   myocardium.  Althought absolute stress flow is preserved in the D2 territory and the defect is mild, relative CFR (a non-invasvie surrogate of FFR) is severely reduced.  Relative CFR reduction is present in    35% of the myocardium in the D2 territory.  Consider directed PCI of the D2 if patient's angina persists. When compared to the previous study from 7-15-13, there is no signficant change.     3. Resting wall motion is physiologic. Stress wall motion is physiologic.   4. Visually estimated LV function is normal at rest and normal at stress.   5. The ventricular volumes are normal at rest and stress.   6. The extracardiac distribution of radioactivity is normal.     Review of Systems   Constitution: Positive for malaise/fatigue. Negative for decreased appetite, diaphoresis, weakness, weight gain and weight loss.   HENT: Negative for headaches.    Eyes: Negative for visual disturbance.   Cardiovascular: Negative for chest pain, claudication, dyspnea on exertion, irregular heartbeat, leg swelling, near-syncope, orthopnea, palpitations, paroxysmal nocturnal dyspnea and syncope.        Denies chest pressure   Respiratory: Positive for snoring. Negative for cough, hemoptysis, shortness of breath and sleep disturbances due to breathing.    Endocrine: Negative for cold intolerance and heat intolerance.   Hematologic/Lymphatic: Negative for bleeding problem. Does not bruise/bleed easily.   Musculoskeletal: Negative for myalgias.   Gastrointestinal: Negative for bloating, abdominal pain, anorexia, change in bowel habit, constipation, diarrhea, nausea and vomiting.   Neurological: Positive for excessive daytime sleepiness. Negative for difficulty with concentration, disturbances in coordination, dizziness, light-headedness, loss of balance and numbness.   Psychiatric/Behavioral: The patient has insomnia.      Allergies and current  "medications updated and reviewed:  Review of patient's allergies indicates:   Allergen Reactions    Sulfa (sulfonamide antibiotics) Photosensitivity    Zocor [simvastatin] Other (See Comments)     lathargic     Current Outpatient Prescriptions   Medication Sig    aspirin (ECOTRIN) 81 MG EC tablet Take 81 mg by mouth once daily.    BD ULTRA-FINE JERMAINE PEN NEEDLES 32 gauge x 5/32" Ndle     clopidogrel (PLAVIX) 75 mg tablet Take 600mg po X1 today and then 75mg po qday    CYANOCOBALAMIN, VITAMIN B-12, (VITAMIN B-12 ORAL) Take by mouth.    EASY TOUCH 31 X 5/16 " Ndle     furosemide (LASIX) 20 MG tablet Take 1 tablet (20 mg total) by mouth once daily.    isosorbide mononitrate (IMDUR) 60 MG 24 hr tablet Take 1 tablet (60 mg total) by mouth once daily.    liraglutide 0.6 mg/0.1 mL, 18 mg/3 mL, subq PNIJ (VICTOZA 3-ANDRE) 0.6 mg/0.1 mL (18 mg/3 mL) PnIj Inject 1.8 mg into the skin once daily.    LYRICA 100 mg capsule 2 (two) times daily.     metoprolol tartrate (LOPRESSOR) 50 MG tablet Take 1 tablet (50 mg total) by mouth 2 (two) times daily.    midodrine (PROAMATINE) 2.5 MG Tab Take 1 tablet (2.5 mg total) by mouth 3 (three) times daily.    nitroGLYCERIN (NITROSTAT) 0.4 MG SL tablet Place 1 tablet (0.4 mg total) under the tongue every 5 (five) minutes as needed for Chest pain.    NOVOLOG 100 unit/mL injection     NOVOLOG FLEXPEN 100 unit/mL InPn pen as needed.     oxycodone-acetaminophen (PERCOCET)  mg per tablet Take 1 tablet by mouth every 6 (six) hours as needed for Pain.    rosuvastatin (CRESTOR) 40 MG Tab Take 1 tablet (40 mg total) by mouth every evening.    sub-q insulin device, 20 unit Lashay by Misc.(Non-Drug; Combo Route) route.    SURE COMFORT INSULIN SYRINGE 1/2 mL 31 x 5/16" Syrg      No current facility-administered medications for this visit.      Objective:     Right Arm BP - Sittin/70 (17 1323)  Left Arm BP - Sittin/64 (17 1323)    /64   Pulse 72   Ht 6' " (1.829 m)   Wt 94.8 kg (208 lb 15.9 oz)   BMI 28.34 kg/m²     Physical Exam   Constitutional: He is oriented to person, place, and time. Vital signs are normal. He appears well-developed and well-nourished. He appears lethargic. He is active. No distress.   HENT:   Head: Normocephalic and atraumatic.   Eyes: Conjunctivae and lids are normal. No scleral icterus.   Neck: Neck supple. Normal carotid pulses, no hepatojugular reflux and no JVD present. Carotid bruit is not present.   Cardiovascular: Normal rate, regular rhythm, S1 normal, S2 normal and intact distal pulses.  PMI is not displaced.  Exam reveals no gallop and no friction rub.    No murmur heard.  Pulses:       Carotid pulses are 2+ on the right side, and 2+ on the left side.       Radial pulses are 2+ on the right side, and 2+ on the left side.        Dorsalis pedis pulses are 2+ on the right side, and 2+ on the left side.        Posterior tibial pulses are 1+ on the right side, and 1+ on the left side.   Pulmonary/Chest: Effort normal and breath sounds normal. No respiratory distress. He has no decreased breath sounds. He has no wheezes. He has no rhonchi. He has no rales. He exhibits no tenderness.   Abdominal: Soft. Normal appearance and bowel sounds are normal. He exhibits no distension, no fluid wave, no abdominal bruit, no ascites and no pulsatile midline mass. There is no hepatosplenomegaly. There is no tenderness.   Musculoskeletal: He exhibits no edema.   Neurological: He is oriented to person, place, and time. He appears lethargic. Gait normal.   Skin: Skin is warm, dry and intact. No rash noted. He is not diaphoretic. Nails show no clubbing.        Psychiatric: He has a normal mood and affect. His speech is normal and behavior is normal. Judgment and thought content normal. Cognition and memory are normal.   Nursing note and vitals reviewed.      Chemistry        Component Value Date/Time     07/31/2017 0428    K 4.0 07/31/2017 0428      07/31/2017 0428    CO2 25 07/31/2017 0428    BUN 19 07/31/2017 0428    CREATININE 1.2 07/31/2017 0428    GLU 92 07/31/2017 0428        Component Value Date/Time    CALCIUM 8.9 07/31/2017 0428    ALKPHOS 90 07/31/2017 0428    AST 26 07/31/2017 0428    ALT 26 07/31/2017 0428    BILITOT 1.0 07/31/2017 0428    ESTGFRAFRICA >60.0 07/31/2017 0428    EGFRNONAA >60.0 07/31/2017 0428        Lab Results   Component Value Date    HGBA1C 8.6 (H) 07/31/2017       Recent Labs  Lab 10/20/16  1224  07/30/17  2146 07/31/17 0428   WHITE BLOOD CELL COUNT 7.27  < > 6.26 6.29   HEMOGLOBIN 15.2  < > 15.0 14.2   HEMATOCRIT 45.5  < > 42.5 41.5   MCV 89  < > 85 88   PLATELETS 172  < > 158 177   BNP  --   --  22  --    TSH 1.354  --   --   --    CHOLESTEROL 115 L  --   --   --    HDL 46  --   --   --    LDL CHOLESTEROL 41.6 L  --   --   --    TRIGLYCERIDES 137  --   --   --    HDL/CHOLESTEROL RATIO 40.0  --   --   --    < > = values in this interval not displayed.      Recent Labs  Lab 07/18/17  1502 07/31/17 0428   INR 1.0 1.0        Test(s) Reviewed  I have reviewed the following in detail:  [x] Stress test   [x] Angiography   [x] Echocardiogram   [x] Labs   [] Other:       Assessment/Plan:     1. Coronary artery disease involving native coronary artery of native heart without angina pectoris  Asymptomatic. Taking GDMT   2. Hx of CABG     3. Subclavian arterial stenosis  Previous stent. No reported sx.   4. Dyslipidemia  LDL at goal <70   5. Post PTCA  Taking Dual antiplatelet therapy   6. Type 2 diabetes mellitus with diabetic peripheral angiopathy without gangrene, with long-term current use of insulin  Not controlled. A1C goal <7   7 Sleep apnea Not using cpap. Visibly tired. Multiple sx reported associated with sleep apnea. No recent sleep clinic visit. Sleep apnea results in sudden drops in blood oxygen levels and increases blood pressure and strains the cardiovascular system.  EMELI may increase the risk of recurrent heart  attack and arrhythmias such as atrial fibrillation.  Stroke risk is increased by untreated EMELI.  If there's underlying heart disease, these multiple episodes hypoxia or hypoxemia can lead to sudden death from an irregular heartbeat.  Additionally, the repeated night time awakenings contribute to daytime sleepiness, irritability, and fatigue. Poor sleep contributes to decreased concentration and increased risk for motor vehicle accidents.     Assessment/Plan:     Coronary artery disease involving native coronary artery of native heart without angina pectoris  Comments:  Continue GDMT    Hx of CABG    Post PTCA  Comments:  Continue DAPT for at least 12 months. Given extensive vascular disease, consider indefinite DAPT    Subclavian arterial stenosis    Dyslipidemia  Comments:  continue rosuvastatin 40mg    Type 2 diabetes mellitus with diabetic peripheral angiopathy without gangrene, with long-term current use of insulin  Comments:  Discussed CV complications associated with uncontrolled diabetes and encouraged to get A1C<7.     Overweight (BMI 25.0-29.9)  Comments:  BMI 28. Encouraged 30 minutes of exercise 4-5 days a week at cardiac rehab and then on his own once he completes rehab.    EMELI (obstructive sleep apnea)  Comments:  Instructed patient to make follow up appointment and discussed complications associated with untreated sleep apnea.          Return in about 6 months (around 2/28/2018).

## 2017-09-11 LAB
DIASTOLIC DYSFUNCTION: NO
ESTIMATED PA SYSTOLIC PRESSURE: 21.34
MITRAL VALVE REGURGITATION: NORMAL
RETIRED EF AND QEF - SEE NOTES: 55 (ref 55–65)
TRICUSPID VALVE REGURGITATION: NORMAL

## 2017-09-14 ENCOUNTER — LAB VISIT (OUTPATIENT)
Dept: LAB | Facility: HOSPITAL | Age: 67
End: 2017-09-14
Attending: UROLOGY
Payer: MEDICARE

## 2017-09-14 DIAGNOSIS — I50.9 ACUTE CONGESTIVE HEART FAILURE, UNSPECIFIED CONGESTIVE HEART FAILURE TYPE: ICD-10-CM

## 2017-09-14 DIAGNOSIS — N20.0 NEPHROLITHIASIS: ICD-10-CM

## 2017-09-14 LAB
25(OH)D3+25(OH)D2 SERPL-MCNC: 36 NG/ML
ANION GAP SERPL CALC-SCNC: 7 MMOL/L
BUN SERPL-MCNC: 20 MG/DL
CALCIUM SERPL-MCNC: 9.6 MG/DL
CHLORIDE SERPL-SCNC: 105 MMOL/L
CO2 SERPL-SCNC: 31 MMOL/L
CREAT SERPL-MCNC: 1.8 MG/DL
EST. GFR  (AFRICAN AMERICAN): 44.7 ML/MIN/1.73 M^2
EST. GFR  (NON AFRICAN AMERICAN): 38.6 ML/MIN/1.73 M^2
GLUCOSE SERPL-MCNC: 198 MG/DL
POTASSIUM SERPL-SCNC: 4.3 MMOL/L
PTH-INTACT SERPL-MCNC: 63 PG/ML
SODIUM SERPL-SCNC: 143 MMOL/L
URATE SERPL-MCNC: 2.7 MG/DL

## 2017-09-14 PROCEDURE — 36415 COLL VENOUS BLD VENIPUNCTURE: CPT | Mod: PO

## 2017-09-14 PROCEDURE — 84550 ASSAY OF BLOOD/URIC ACID: CPT

## 2017-09-14 PROCEDURE — 83970 ASSAY OF PARATHORMONE: CPT

## 2017-09-14 PROCEDURE — 80048 BASIC METABOLIC PNL TOTAL CA: CPT

## 2017-09-14 PROCEDURE — 82306 VITAMIN D 25 HYDROXY: CPT

## 2017-09-15 ENCOUNTER — TELEPHONE (OUTPATIENT)
Dept: CARDIOLOGY | Facility: CLINIC | Age: 67
End: 2017-09-15

## 2017-09-15 DIAGNOSIS — I25.10 CORONARY ARTERY DISEASE INVOLVING NATIVE CORONARY ARTERY OF NATIVE HEART WITHOUT ANGINA PECTORIS: Primary | ICD-10-CM

## 2017-09-15 NOTE — TELEPHONE ENCOUNTER
----- Message from Juancho Black MD sent at 9/14/2017  6:08 PM CDT -----  Please call Mr. Knutson and ask if he is feeling less short of breath since starting lasix.  If so, then tell him to stop his lasix bc his cr=1.8. Re-check BMP in 1 week.

## 2017-09-15 NOTE — TELEPHONE ENCOUNTER
Notified patient of results of BMP. Patient stated he is feeling much better, sob has resolved. Instructed patient to stop lasix, per order Dr. Black, and scheduled repeat BMP for Friday, 9/22. Patient verbalized understanding. Instructed patient to call if sob returns. All questions answered.

## 2017-10-02 ENCOUNTER — LAB VISIT (OUTPATIENT)
Dept: LAB | Facility: HOSPITAL | Age: 67
End: 2017-10-02
Attending: INTERNAL MEDICINE
Payer: MEDICARE

## 2017-10-02 DIAGNOSIS — I25.10 CORONARY ARTERY DISEASE INVOLVING NATIVE CORONARY ARTERY OF NATIVE HEART WITHOUT ANGINA PECTORIS: ICD-10-CM

## 2017-10-02 LAB
ANION GAP SERPL CALC-SCNC: 8 MMOL/L
BUN SERPL-MCNC: 30 MG/DL
CALCIUM SERPL-MCNC: 9.2 MG/DL
CHLORIDE SERPL-SCNC: 105 MMOL/L
CO2 SERPL-SCNC: 26 MMOL/L
CREAT SERPL-MCNC: 1.8 MG/DL
EST. GFR  (AFRICAN AMERICAN): 44.7 ML/MIN/1.73 M^2
EST. GFR  (NON AFRICAN AMERICAN): 38.6 ML/MIN/1.73 M^2
GLUCOSE SERPL-MCNC: 249 MG/DL
POTASSIUM SERPL-SCNC: 5 MMOL/L
SODIUM SERPL-SCNC: 139 MMOL/L

## 2017-10-02 PROCEDURE — 80048 BASIC METABOLIC PNL TOTAL CA: CPT

## 2017-10-02 PROCEDURE — 36415 COLL VENOUS BLD VENIPUNCTURE: CPT | Mod: PO

## 2017-10-03 DIAGNOSIS — I25.10 CORONARY ARTERY DISEASE, ANGINA PRESENCE UNSPECIFIED, UNSPECIFIED VESSEL OR LESION TYPE, UNSPECIFIED WHETHER NATIVE OR TRANSPLANTED HEART: Primary | ICD-10-CM

## 2017-10-12 ENCOUNTER — TELEPHONE (OUTPATIENT)
Dept: CARDIOLOGY | Facility: CLINIC | Age: 67
End: 2017-10-12

## 2017-10-12 ENCOUNTER — LAB VISIT (OUTPATIENT)
Dept: LAB | Facility: HOSPITAL | Age: 67
End: 2017-10-12
Attending: INTERNAL MEDICINE
Payer: MEDICARE

## 2017-10-12 DIAGNOSIS — I25.10 CORONARY ARTERY DISEASE INVOLVING NATIVE CORONARY ARTERY OF NATIVE HEART WITHOUT ANGINA PECTORIS: Primary | ICD-10-CM

## 2017-10-12 DIAGNOSIS — I25.10 CORONARY ARTERY DISEASE INVOLVING NATIVE CORONARY ARTERY OF NATIVE HEART WITHOUT ANGINA PECTORIS: ICD-10-CM

## 2017-10-12 LAB
ANION GAP SERPL CALC-SCNC: 6 MMOL/L
BUN SERPL-MCNC: 18 MG/DL
CALCIUM SERPL-MCNC: 9.4 MG/DL
CHLORIDE SERPL-SCNC: 103 MMOL/L
CO2 SERPL-SCNC: 29 MMOL/L
CREAT SERPL-MCNC: 1.4 MG/DL
EST. GFR  (AFRICAN AMERICAN): >60 ML/MIN/1.73 M^2
EST. GFR  (NON AFRICAN AMERICAN): 52.4 ML/MIN/1.73 M^2
GLUCOSE SERPL-MCNC: 214 MG/DL
POTASSIUM SERPL-SCNC: 4.3 MMOL/L
SODIUM SERPL-SCNC: 138 MMOL/L

## 2017-10-12 PROCEDURE — 36415 COLL VENOUS BLD VENIPUNCTURE: CPT | Mod: PO

## 2017-10-12 PROCEDURE — 80048 BASIC METABOLIC PNL TOTAL CA: CPT
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
## 2017-10-16 ENCOUNTER — TELEPHONE (OUTPATIENT)
Dept: CARDIOLOGY | Facility: CLINIC | Age: 67
End: 2017-10-16

## 2017-10-16 NOTE — TELEPHONE ENCOUNTER
Notified patient of results of BMP. Instructed patient to continue to stay hydrated. Patient verbalized understanding.

## 2017-10-25 DIAGNOSIS — I95.1 ORTHOSTATIC HYPOTENSION: ICD-10-CM

## 2017-10-25 RX ORDER — MIDODRINE HYDROCHLORIDE 2.5 MG/1
2.5 TABLET ORAL 3 TIMES DAILY
Qty: 90 TABLET | Refills: 6 | Status: SHIPPED | OUTPATIENT
Start: 2017-10-25 | End: 2018-10-02

## 2018-03-01 ENCOUNTER — OFFICE VISIT (OUTPATIENT)
Dept: CARDIOLOGY | Facility: CLINIC | Age: 68
End: 2018-03-01
Payer: MEDICARE

## 2018-03-01 VITALS
DIASTOLIC BLOOD PRESSURE: 55 MMHG | HEIGHT: 72 IN | SYSTOLIC BLOOD PRESSURE: 108 MMHG | HEART RATE: 71 BPM | WEIGHT: 207.88 LBS | BODY MASS INDEX: 28.16 KG/M2

## 2018-03-01 DIAGNOSIS — I25.10 CORONARY ARTERY DISEASE INVOLVING NATIVE CORONARY ARTERY OF NATIVE HEART WITHOUT ANGINA PECTORIS: Chronic | ICD-10-CM

## 2018-03-01 DIAGNOSIS — Z79.4 TYPE 2 DIABETES MELLITUS WITH DIABETIC PERIPHERAL ANGIOPATHY WITHOUT GANGRENE, WITH LONG-TERM CURRENT USE OF INSULIN: ICD-10-CM

## 2018-03-01 DIAGNOSIS — I50.32 CHRONIC DIASTOLIC CONGESTIVE HEART FAILURE: Primary | Chronic | ICD-10-CM

## 2018-03-01 DIAGNOSIS — E11.51 TYPE 2 DIABETES MELLITUS WITH DIABETIC PERIPHERAL ANGIOPATHY WITHOUT GANGRENE, WITH LONG-TERM CURRENT USE OF INSULIN: ICD-10-CM

## 2018-03-01 DIAGNOSIS — Z95.820 S/P PERIPHERAL ARTERY ANGIOPLASTY WITH STENT PLACEMENT: ICD-10-CM

## 2018-03-01 DIAGNOSIS — I95.1 ORTHOSTASIS: ICD-10-CM

## 2018-03-01 DIAGNOSIS — G47.33 OSA (OBSTRUCTIVE SLEEP APNEA): ICD-10-CM

## 2018-03-01 DIAGNOSIS — I25.718 ATHEROSCLEROSIS OF AUTOLOGOUS VEIN CORONARY ARTERY BYPASS GRAFT WITH STABLE ANGINA PECTORIS: ICD-10-CM

## 2018-03-01 DIAGNOSIS — I77.1 SUBCLAVIAN ARTERIAL STENOSIS: ICD-10-CM

## 2018-03-01 DIAGNOSIS — Z98.61 POST PTCA: Chronic | ICD-10-CM

## 2018-03-01 DIAGNOSIS — Z95.1 S/P CABG X 5: ICD-10-CM

## 2018-03-01 DIAGNOSIS — E78.5 DYSLIPIDEMIA: Chronic | ICD-10-CM

## 2018-03-01 PROCEDURE — 3074F SYST BP LT 130 MM HG: CPT | Mod: S$GLB,,, | Performed by: NURSE PRACTITIONER

## 2018-03-01 PROCEDURE — 99999 PR PBB SHADOW E&M-EST. PATIENT-LVL III: CPT | Mod: PBBFAC,,, | Performed by: NURSE PRACTITIONER

## 2018-03-01 PROCEDURE — 99214 OFFICE O/P EST MOD 30 MIN: CPT | Mod: S$GLB,,, | Performed by: NURSE PRACTITIONER

## 2018-03-01 PROCEDURE — 3078F DIAST BP <80 MM HG: CPT | Mod: S$GLB,,, | Performed by: NURSE PRACTITIONER

## 2018-03-01 NOTE — PROGRESS NOTES
Mr. Knutson is a patient of Dr. Allen and was last seen in [facility] Cardiology Visit 8/29/17.    Subjective:   Patient ID:  Sherif Knutson Jr. is a 67 y.o. male who presents for follow-up of Coronary Artery Disease (6 month f/u )    Problems:  CAD s/p CABGx5 in 2000 (SVG-OM1; LIMA-LAD; SVG-RCA; SVG-D1)   -TYSON RCA (2006); LCx (2013); OM3 (7/24/17)  Subclavian artery stenosis, left s/p stent in 2013  Carotid artery disease bilateral (JERAD 20-39%; LICA 40-49%)  HFpEF, 55-60%  HLD  HTN  EMELI  DM type II, uncontrolled    HPI  Mr. Knutson is in clinic today for routine follow up.  He continues to have exertional chest pain that takes less exertion to bring it on.  His pain is brought on with throwing a baseball or walking briskly to get the ball.  The pain is in the left side of the chest and radiates into the left shoulder.  He has used the NTG twice since this summer.  States that he has had to modify his lifestyle to have less exercise to prevent the chest tightness/pain from coming on.  The chest pain is associated with SOB.  Pain is relieved with rest or NTG.  States that he felt significantly better for 6 weeks after he had his stent.  Patient denies palpitations, SOB, GLEZ, dizziness, syncope, edema, orthopnea, PND, or claudication.  Reports no routine exercise.  He is treated with low dose ASA and high intensity statin.  Patient is taking rosuvastatin 40mg and LDL is 42.  Reports some orthostatic hypotension with BP of 70-80s systolic if he rises quickly.  It has improved with taking the metoprolol 50mg only once at night.  He is taking the lasix as needed for SOB/orthopnea.  States that the last time that he needed the lasix was about 2 weeks ago.  Patient has history of obstructive sleep apnea.  Reports nightly use of CPAP machine and denies any associated sx of EMELI.      PET 8/14/17: ABNORMAL MYOCARDIAL PERFUSION PET STRESS TEST  1. There is a mild ischemia in the mid to distal anterior and anterolateral walls in the usual  distribution of the D2 +/- D1. This defect comprises 10 % of the left ventricular myocardium.  Absolute flow and CFR were calculated for all regions of   myocardium.  Although absolute stress flow is preserved in the D2 territory and the defect is mild, relative CFR (a non-invasvie surrogate of FFR) is severely reduced.  Relative CFR reduction is present in    35% of the myocardium in the D2 territory.  Consider directed PCI of the D2 if patient's angina persists. When compared to the previous study from 7-15-13, there is no signficant change.     3. Resting wall motion is physiologic. Stress wall motion is physiologic.   4. Visually estimated LV function is normal at rest and normal at stress.   5. The ventricular volumes are normal at rest and stress.   6. The extracardiac distribution of radioactivity is normal.     Review of Systems   Constitution: Negative for decreased appetite, diaphoresis, weakness, malaise/fatigue, weight gain and weight loss.   Eyes: Negative for visual disturbance.   Cardiovascular: Positive for chest pain and dyspnea on exertion. Negative for claudication, irregular heartbeat, leg swelling, near-syncope, orthopnea, palpitations, paroxysmal nocturnal dyspnea and syncope.        Denies chest pressure   Respiratory: Negative for cough, hemoptysis, shortness of breath, sleep disturbances due to breathing and snoring.    Endocrine: Negative for cold intolerance and heat intolerance.   Hematologic/Lymphatic: Negative for bleeding problem. Does not bruise/bleed easily.   Musculoskeletal: Negative for myalgias.   Gastrointestinal: Negative for bloating, abdominal pain, anorexia, change in bowel habit, constipation, diarrhea, nausea and vomiting.   Neurological: Negative for difficulty with concentration, disturbances in coordination, excessive daytime sleepiness, dizziness, headaches, light-headedness, loss of balance and numbness.   Psychiatric/Behavioral: The patient does not have insomnia.   "    Allergies and current medications updated and reviewed:  Review of patient's allergies indicates:   Allergen Reactions    Sulfa (sulfonamide antibiotics) Photosensitivity    Zocor [simvastatin] Other (See Comments)     lathargic     Current Outpatient Prescriptions   Medication Sig    aspirin (ECOTRIN) 81 MG EC tablet Take 81 mg by mouth once daily.    BD ULTRA-FINE JERMAINE PEN NEEDLES 32 gauge x 5/32" Ndle     clopidogrel (PLAVIX) 75 mg tablet Take 600mg po X1 today and then 75mg po qday    CYANOCOBALAMIN, VITAMIN B-12, (VITAMIN B-12 ORAL) Take by mouth.    EASY TOUCH 31 X 5/16 " Ndle     furosemide (LASIX) 20 MG tablet Take 1 tablet (20 mg total) by mouth once daily.    isosorbide mononitrate (IMDUR) 60 MG 24 hr tablet Take 1 tablet (60 mg total) by mouth once daily.    liraglutide 0.6 mg/0.1 mL, 18 mg/3 mL, subq PNIJ (VICTOZA 3-ANDRE) 0.6 mg/0.1 mL (18 mg/3 mL) PnIj Inject 1.8 mg into the skin once daily.    LYRICA 100 mg capsule 2 (two) times daily.     metoprolol tartrate (LOPRESSOR) 50 MG tablet Take 1 tablet (50 mg total) by mouth 2 (two) times daily.    midodrine (PROAMATINE) 2.5 MG Tab Take 1 tablet (2.5 mg total) by mouth 3 (three) times daily.    nitroGLYCERIN (NITROSTAT) 0.4 MG SL tablet Place 1 tablet (0.4 mg total) under the tongue every 5 (five) minutes as needed for Chest pain.    NOVOLOG 100 unit/mL injection     NOVOLOG FLEXPEN 100 unit/mL InPn pen as needed.     oxycodone-acetaminophen (PERCOCET)  mg per tablet Take 1 tablet by mouth every 6 (six) hours as needed for Pain.    rosuvastatin (CRESTOR) 40 MG Tab Take 1 tablet (40 mg total) by mouth every evening.    sub-q insulin device, 20 unit Lashay by Misc.(Non-Drug; Combo Route) route.    SURE COMFORT INSULIN SYRINGE 1/2 mL 31 x 5/16" Syrg      No current facility-administered medications for this visit.      Objective:     Right Arm BP - Sittin/53 (18 1051)  Left Arm BP - Sittin/55 (18 1051)    BP " (!) 108/55 (BP Location: Left arm, Patient Position: Sitting, BP Method: Medium (Automatic))   Pulse 71   Ht 6' (1.829 m)   Wt 94.3 kg (207 lb 14.3 oz)   BMI 28.20 kg/m²     Physical Exam   Constitutional: He is oriented to person, place, and time. Vital signs are normal. He appears well-developed and well-nourished. He is active. No distress.   HENT:   Head: Normocephalic and atraumatic.   Eyes: Conjunctivae and lids are normal. No scleral icterus.   Neck: Neck supple. Normal carotid pulses, no hepatojugular reflux and no JVD present. Carotid bruit is not present.   Cardiovascular: Normal rate, regular rhythm, S1 normal, S2 normal and intact distal pulses.  PMI is not displaced.  Exam reveals no gallop and no friction rub.    No murmur heard.  Pulses:       Carotid pulses are 2+ on the right side, and 2+ on the left side.       Radial pulses are 2+ on the right side, and 2+ on the left side.        Dorsalis pedis pulses are 2+ on the right side, and 2+ on the left side.        Posterior tibial pulses are 1+ on the right side, and 1+ on the left side.   Pulmonary/Chest: Effort normal and breath sounds normal. No respiratory distress. He has no decreased breath sounds. He has no wheezes. He has no rhonchi. He has no rales. He exhibits no tenderness.   Abdominal: Soft. Normal appearance and bowel sounds are normal. He exhibits no distension, no fluid wave, no abdominal bruit, no ascites and no pulsatile midline mass. There is no hepatosplenomegaly. There is no tenderness.   Musculoskeletal: He exhibits no edema.   Neurological: He is alert and oriented to person, place, and time. Gait normal.   Skin: Skin is warm, dry and intact. No rash noted. He is not diaphoretic. Nails show no clubbing.   Psychiatric: He has a normal mood and affect. His speech is normal and behavior is normal. Judgment and thought content normal. Cognition and memory are normal.   Nursing note and vitals reviewed.      Chemistry         Component Value Date/Time     10/12/2017 1249    K 4.3 10/12/2017 1249     10/12/2017 1249    CO2 29 10/12/2017 1249    BUN 18 10/12/2017 1249    CREATININE 1.4 10/12/2017 1249     (H) 10/12/2017 1249        Component Value Date/Time    CALCIUM 9.4 10/12/2017 1249    ALKPHOS 90 07/31/2017 0428    AST 26 07/31/2017 0428    ALT 26 07/31/2017 0428    BILITOT 1.0 07/31/2017 0428    ESTGFRAFRICA >60.0 10/12/2017 1249    EGFRNONAA 52.4 (A) 10/12/2017 1249          Lab Results   Component Value Date    HGBA1C 8.6 (H) 07/31/2017         Recent Labs  Lab 10/20/16  1224  07/30/17  2146 07/31/17  0428   WHITE BLOOD CELL COUNT 7.27  < > 6.26 6.29   HEMOGLOBIN 15.2  < > 15.0 14.2   HEMATOCRIT 45.5  < > 42.5 41.5   MCV 89  < > 85 88   PLATELETS 172  < > 158 177   BNP  --   --  22  --    TSH 1.354  --   --   --    CHOLESTEROL 115 L  --   --   --    HDL 46  --   --   --    LDL CHOLESTEROL 41.6 L  --   --   --    TRIGLYCERIDES 137  --   --   --    HDL/CHOLESTEROL RATIO 40.0  --   --   --    < > = values in this interval not displayed.      Recent Labs  Lab 07/18/17  1502 07/31/17  0428   INR 1.0 1.0        Test(s) Reviewed  I have reviewed the following in detail:  [x] Stress test   [x] Angiography   [x] Echocardiogram   [x] Labs   [] Other:         Assessment/Plan:     Chronic diastolic congestive heart failure  Comments:  Well compensated. Asymptomatic. No changes.     Coronary artery disease involving native coronary artery of native heart without angina pectoris  Comments:  Symptomatic. Taking high intensity statin therapy and DAPT. Refer back to interventional cardiology, Dr. Black  Orders:  -     Comprehensive metabolic panel; Future; Expected date: 08/31/2018  -     Lipid panel; Future; Expected date: 08/31/2018  -     Magnesium; Future; Expected date: 09/01/2018    S/P CABG x 5    Atherosclerosis of autologous vein coronary artery bypass graft with stable angina pectoris    Post PTCA    Subclavian  arterial stenosis    S/P subclavian artery angioplasty with stent placement in 8/2013    Dyslipidemia  Comments:  LDL at goal <70. Continue rosuvastatin 40mg    Type 2 diabetes mellitus with diabetic peripheral angiopathy without gangrene, with long-term current use of insulin  Comments:  A1C not at goal <7. Discussed potential complications of uncontrolled DM     EMELI (obstructive sleep apnea)  Comments:  Encouraged use of cpap    Orthostasis  Comments:  Lightheadedness and drop in BP with rising. Discussed rising slowly and wearing compression stockings.   Orders:  -     COMPRESSION STOCKINGS    CC: Dr. Jose F Menon     Follow-up in about 6 months (around 9/1/2018).

## 2018-03-15 ENCOUNTER — OFFICE VISIT (OUTPATIENT)
Dept: CARDIOLOGY | Facility: CLINIC | Age: 68
End: 2018-03-15
Payer: MEDICARE

## 2018-03-15 ENCOUNTER — DOCUMENTATION ONLY (OUTPATIENT)
Dept: CARDIOLOGY | Facility: CLINIC | Age: 68
End: 2018-03-15

## 2018-03-15 VITALS
HEIGHT: 72 IN | SYSTOLIC BLOOD PRESSURE: 125 MMHG | DIASTOLIC BLOOD PRESSURE: 64 MMHG | WEIGHT: 210.31 LBS | BODY MASS INDEX: 28.48 KG/M2 | OXYGEN SATURATION: 95 % | HEART RATE: 71 BPM

## 2018-03-15 DIAGNOSIS — I50.9 ACUTE CONGESTIVE HEART FAILURE, UNSPECIFIED CONGESTIVE HEART FAILURE TYPE: ICD-10-CM

## 2018-03-15 DIAGNOSIS — Z79.4 TYPE 2 DIABETES MELLITUS WITH DIABETIC PERIPHERAL ANGIOPATHY WITHOUT GANGRENE, WITH LONG-TERM CURRENT USE OF INSULIN: ICD-10-CM

## 2018-03-15 DIAGNOSIS — Z95.1 S/P CABG X 5: ICD-10-CM

## 2018-03-15 DIAGNOSIS — E11.51 TYPE 2 DIABETES MELLITUS WITH DIABETIC PERIPHERAL ANGIOPATHY WITHOUT GANGRENE, WITH LONG-TERM CURRENT USE OF INSULIN: ICD-10-CM

## 2018-03-15 DIAGNOSIS — E78.5 DYSLIPIDEMIA: ICD-10-CM

## 2018-03-15 DIAGNOSIS — I20.9 ANGINA, CLASS III: Primary | ICD-10-CM

## 2018-03-15 DIAGNOSIS — Z95.820 S/P PERIPHERAL ARTERY ANGIOPLASTY WITH STENT PLACEMENT: ICD-10-CM

## 2018-03-15 DIAGNOSIS — I25.718 ATHEROSCLEROSIS OF AUTOLOGOUS VEIN CORONARY ARTERY BYPASS GRAFT WITH STABLE ANGINA PECTORIS: ICD-10-CM

## 2018-03-15 DIAGNOSIS — I25.10 CORONARY ARTERY DISEASE INVOLVING NATIVE CORONARY ARTERY OF NATIVE HEART WITHOUT ANGINA PECTORIS: Primary | ICD-10-CM

## 2018-03-15 PROCEDURE — 3074F SYST BP LT 130 MM HG: CPT | Mod: CPTII,S$GLB,, | Performed by: INTERNAL MEDICINE

## 2018-03-15 PROCEDURE — 99999 PR PBB SHADOW E&M-EST. PATIENT-LVL IV: CPT | Mod: PBBFAC,,, | Performed by: INTERNAL MEDICINE

## 2018-03-15 PROCEDURE — 99214 OFFICE O/P EST MOD 30 MIN: CPT | Mod: S$GLB,,, | Performed by: INTERNAL MEDICINE

## 2018-03-15 PROCEDURE — 3078F DIAST BP <80 MM HG: CPT | Mod: CPTII,S$GLB,, | Performed by: INTERNAL MEDICINE

## 2018-03-15 PROCEDURE — 3045F PR MOST RECENT HEMOGLOBIN A1C LEVEL 7.0-9.0%: CPT | Mod: CPTII,S$GLB,, | Performed by: INTERNAL MEDICINE

## 2018-03-15 RX ORDER — SODIUM CHLORIDE 9 MG/ML
3 INJECTION, SOLUTION INTRAVENOUS CONTINUOUS
Status: CANCELLED | OUTPATIENT
Start: 2018-03-15 | End: 2018-03-15

## 2018-03-15 RX ORDER — DIPHENHYDRAMINE HCL 25 MG
50 CAPSULE ORAL ONCE
Status: CANCELLED | OUTPATIENT
Start: 2018-03-15 | End: 2018-03-15

## 2018-03-15 NOTE — PROGRESS NOTES
OUTPATIENT CATHETERIZATION INSTRUCTIONS    You have been scheduled for a procedure in the catheterization lab on Tuesday, April 3,  2018.     Please report to the Cardiology Waiting Area on the Third floor of the hospital and check in at 7:30 AM.   You will then be taken to the SSCU (Short Stay Cardiac Unit) and prepared for your procedure. Please be aware that this is not the time of your procedure but the time you are to arrive. The procedures are scheduled on an hourly basis; however, emergency cases take precedence over all other cases.       You may not have anything to eat or drink after midnight the night before your test. You may take your regular morning medications with water. If there are any medications that you should not take you will be instructed to hold them that morning. If you are diabetic and on Metformin (Glucophage) do not take it the day before, the day of, and for 2 days after your procedure.      The procedure will take 1-2 hours to perform. After the procedure, you will return to SSCU on the third floor of the hospital. You will need to lie still (or keep your arm still) for the next 4 to 6 hours to minimize bleeding from the puncture site. Your family may remain in the room with you during this time.       You may be able to be discharged home that same afternoon if there is someone to drive you home and there were no complications. If you have one of the balloon, stent, or device procedures you may spend the night in the hospital. Your doctor will determine, based on your progress, the date and time of your discharge. The results of your procedure will be discussed with you before you are discharged. Any further testing or procedures will be scheduled for you either before you leave or you will be called with these appointments.       If you should have any questions, concerns, or need to change the date of your procedure, please call  MJ Lemons @ [phone]    Special  Instructions:  Hold your regularly scheduled insulin dose the morning of your procedure.  Take 1/2 of your regularly scheduled insulin dose the night before your procedure.  Drink plenty of water the day before and after the procedure.        THE ABOVE INSTRUCTIONS WERE GIVEN TO THE PATIENT VERBALLY AND THEY VERBALIZED UNDERSTANDING.  THEY DO NOT REQUIRE ANY SPECIAL NEEDS AND DO NOT HAVE ANY LEARNING BARRIERS.          Directions for Reporting to Cardiology Waiting Area in the Hospital  If you park in the Parking Garage:  Take elevators to the1st floor of the parking garage.  Continue past the gift shop, coffee shop, and piano.  Take a right and go to the gold elevators. (Elevator B)  Take the elevator to the 3rd floor.  Follow the arrow on the sign on the wall that says Cath Lab Registration/EP Lab Registration.  Follow the long hallway all the way around until you come to a big open area.  This is the registration area.  Check in at Reception Desk.    OR    If family is dropping you off:  Have them drop you off at the front of the Hospital under the green overhang.  Enter through the doors and take a right.  Take the E elevators to the 3rd floor Cardiology Waiting Area.  Check in at the Reception Desk in the waiting room.

## 2018-03-19 ENCOUNTER — HOSPITAL ENCOUNTER (OUTPATIENT)
Dept: CARDIOLOGY | Facility: CLINIC | Age: 68
Discharge: HOME | End: 2018-03-19
Attending: INTERNAL MEDICINE
Payer: MEDICARE

## 2018-03-19 DIAGNOSIS — I50.9 ACUTE CONGESTIVE HEART FAILURE, UNSPECIFIED CONGESTIVE HEART FAILURE TYPE: ICD-10-CM

## 2018-03-19 LAB
DIASTOLIC DYSFUNCTION: NO
ESTIMATED PA SYSTOLIC PRESSURE: 19.97
RETIRED EF AND QEF - SEE NOTES: 65 (ref 55–65)
TRICUSPID VALVE REGURGITATION: NORMAL

## 2018-03-19 PROCEDURE — 93306 TTE W/DOPPLER COMPLETE: CPT | Mod: S$GLB,,, | Performed by: INTERNAL MEDICINE

## 2018-03-19 NOTE — PROGRESS NOTES
Subjective:    Patient ID:  Sherif Knutson Jr. is a 67 y.o. male who presents for evaluation of Coronary Artery Disease    Referring cardiologist: Dr Ricky CHRISTINE  Mr. Knutson s a 68 y/o gentleman with a h/o CAD (s/p CABG and left subclavian artery stent) who reports 2 months of new onset orthopnea and pND such that he has started sleeping in a lounge chair.  He started taking lasix 20mg pRN which resulted in resolution of these symptoms.  Howver he also also been experiencing chest heaviness for two months that radiates to his left shoulder.  Thsi symptom is relieved with NTG SL X 1 and brought on with exertion such as playing baseball with his grandson.  He denies LE edema.  He denies palpitations or syncope, but reports 2 episodes of lightheadedness 2 weeks ago when strainign upon pulling an engine out of a car.  He reports good medication compliance.  He tries to adhere to a heart healthy diet.    Past Medical History:   Diagnosis Date    CHF (congestive heart failure) 8/29/2017    Coronary artery disease 2006, 5/2013    s/p pci to rca (2006), LCx (5/2013)    DM (diabetes mellitus)     HLD (hyperlipidemia)     HTN (hypertension)     Kidney stone     EMELI (obstructive sleep apnea) 11/2/2015    Subclavian artery stenosis, left      Past Surgical History:   Procedure Laterality Date    BACK SURGERY      CHOLECYSTECTOMY  2011    CORONARY ANGIOPLASTY      CORONARY ARTERY BYPASS GRAFT  2000    5 bypass surgeries    TONSILLECTOMY       Current Outpatient Prescriptions on File Prior to Visit   Medication Sig Dispense Refill    aspirin (ECOTRIN) 81 MG EC tablet Take 325 mg by mouth once daily.       clopidogrel (PLAVIX) 75 mg tablet Take 600mg po X1 today and then 75mg po qday 30 tablet 11    CYANOCOBALAMIN, VITAMIN B-12, (VITAMIN B-12 ORAL) Take by mouth.      isosorbide mononitrate (IMDUR) 60 MG 24 hr tablet Take 1 tablet (60 mg total) by mouth once daily. 30 tablet 0    liraglutide 0.6 mg/0.1 mL, 18 mg/3  "mL, subq PNIJ (VICTOZA 3-ANDRE) 0.6 mg/0.1 mL (18 mg/3 mL) PnIj Inject 1.8 mg into the skin once daily.      LYRICA 100 mg capsule 2 (two) times daily.       metoprolol tartrate (LOPRESSOR) 50 MG tablet Take 1 tablet (50 mg total) by mouth 2 (two) times daily. (Patient taking differently: Take 50 mg by mouth once daily. ) 60 tablet 0    midodrine (PROAMATINE) 2.5 MG Tab Take 1 tablet (2.5 mg total) by mouth 3 (three) times daily. 90 tablet 6    NOVOLOG FLEXPEN 100 unit/mL InPn pen as needed.       oxycodone-acetaminophen (PERCOCET)  mg per tablet Take 1 tablet by mouth every 6 (six) hours as needed for Pain. 40 tablet 0    ranitidine (ZANTAC) 300 MG tablet       rosuvastatin (CRESTOR) 40 MG Tab Take 1 tablet (40 mg total) by mouth every evening. 30 tablet 6    sub-q insulin device, 20 unit Lashay by Misc.(Non-Drug; Combo Route) route.      BD ULTRA-FINE JERMAINE PEN NEEDLES 32 gauge x 5/32" Ndle       EASY TOUCH 31 X 5/16 " Ndle       furosemide (LASIX) 20 MG tablet Take 1 tablet (20 mg total) by mouth once daily. 30 tablet 6    nitroGLYCERIN (NITROSTAT) 0.4 MG SL tablet Place 1 tablet (0.4 mg total) under the tongue every 5 (five) minutes as needed for Chest pain. 20 tablet 0    NOVOLOG 100 unit/mL injection       SURE COMFORT INSULIN SYRINGE 1/2 mL 31 x 5/16" Syrg        No current facility-administered medications on file prior to visit.      .alelr  Social History   Substance Use Topics    Smoking status: Never Smoker    Smokeless tobacco: Never Used    Alcohol use No     Family History   Problem Relation Age of Onset    Heart disease Mother     Heart attack Mother     Heart disease Father     Heart attack Father     Hypertension Brother     Heart disease Paternal Uncle     Prostate cancer Neg Hx     Kidney disease Neg Hx        Review of Systems   Constitution: Negative for decreased appetite, diaphoresis, fever, malaise/fatigue, weight gain and weight loss.   HENT: Negative for " congestion, nosebleeds and sore throat.    Eyes: Negative for blurred vision, vision loss in left eye, vision loss in right eye and visual disturbance.   Cardiovascular: Positive for chest pain, dyspnea on exertion, orthopnea and paroxysmal nocturnal dyspnea. Negative for claudication, leg swelling, near-syncope, palpitations and syncope.   Respiratory: Negative for cough, hemoptysis, shortness of breath and wheezing.    Endocrine: Negative for polyuria.   Hematologic/Lymphatic: Does not bruise/bleed easily.   Skin: Negative for nail changes and rash.   Musculoskeletal: Negative for back pain, muscle cramps and myalgias.   Gastrointestinal: Negative for abdominal pain, change in bowel habit, diarrhea, heartburn, hematemesis, hematochezia, melena, nausea and vomiting.   Genitourinary: Negative for bladder incontinence, dysuria, frequency and hematuria.   Psychiatric/Behavioral: Negative for depression.   Allergic/Immunologic: Negative for hives.        Objective:  Vitals:    03/15/18 1357 03/15/18 1359   BP: 120/61 125/64   BP Location: Left arm Right arm   Patient Position: Sitting Sitting   BP Method: Large (Automatic) Large (Automatic)   Pulse: 69 71   SpO2: 95%    Weight: 95.4 kg (210 lb 5.1 oz)    Height: 6' (1.829 m)          Physical Exam   Constitutional: He is oriented to person, place, and time. He appears well-developed and well-nourished.   HENT:   Head: Normocephalic and atraumatic.   Eyes: EOM are normal. Pupils are equal, round, and reactive to light.   Neck: Neck supple. No JVD present. No thyromegaly present.   Cardiovascular: Normal rate and regular rhythm.  PMI is displaced.  Exam reveals no gallop and no friction rub.    No murmur heard.  Pulses:       Carotid pulses are 2+ on the right side, and 2+ on the left side.       Radial pulses are 2+ on the right side, and 2+ on the left side.        Femoral pulses are 2+ on the right side, and 2+ on the left side.       Dorsalis pedis pulses are 2+ on  the right side, and 2+ on the left side.        Posterior tibial pulses are 2+ on the right side, and 2+ on the left side.   Pulmonary/Chest: Effort normal. He has no wheezes. He has no rhonchi. He has no rales.   Abdominal: Soft. Normal appearance. He exhibits no distension. There is no hepatosplenomegaly. There is no tenderness.   Neurological: He is alert and oriented to person, place, and time. Gait normal.   Psychiatric: He has a normal mood and affect.         Assessment:       1. Angina, class III    2. Acute congestive heart failure, unspecified congestive heart failure type    3. Atherosclerosis of autologous vein coronary artery bypass graft with stable angina pectoris    4. Dyslipidemia    5. Type 2 diabetes mellitus with diabetic peripheral angiopathy without gangrene, with long-term current use of insulin    6. S/P CABG x 5 in 2000    7. S/P subclavian artery angioplasty with stent placement in 8/2013         Plan:       1) CAD.  The patient reports symptoms concerning for CCS 3 angina as well as NYHA class 3 symptoms.  It is likely that the patient has developed de radha coronary stenosis, ISR of his coronary stents, or a combination of these problems.  Therefore rec proceeding with cardiac catheterization and possible PCI.  -continue EC ASA 81mg po qday  -continue 75mg po q day  -continue Toprol XL 25mgpo q day  -6Fr right CFA access; will perform diagnostic LH and possible PCI .  The risks, benefits, and alternatives of cardiac catheterization and possible intervention were discussed with the patient.  All questions were answered and informed consent was obtained.  -use of NTG SL reviwewed with patient; if he experiences angina not relieved with NTG SL X3, then he will call 911     2) NYHA class 3 symptoms. Patient appears euvolemic by exam in clinic today, but reports new onset NYHA class 3 symptoms relieved with lasix; will get TTE to evaluate LVEF     3) Dyslipidemia. Continue statin therapy     4)  CKD3. IV hydration pre-procedure; encourage po hydration post procedure; Visipaque contrast     5) DM2. 7/31/17 [id number]=4/6; rec tight glycemic control;     All of the patient's and his wife's questions were answered.

## 2018-03-21 ENCOUNTER — TELEPHONE (OUTPATIENT)
Dept: CARDIOLOGY | Facility: CLINIC | Age: 68
End: 2018-03-21

## 2018-04-03 ENCOUNTER — HOSPITAL ENCOUNTER (OUTPATIENT)
Facility: HOSPITAL | Age: 68
Discharge: HOME | End: 2018-04-03
Attending: INTERNAL MEDICINE | Admitting: INTERNAL MEDICINE
Payer: MEDICARE

## 2018-04-03 VITALS
RESPIRATION RATE: 16 BRPM | OXYGEN SATURATION: 95 % | HEIGHT: 72 IN | HEART RATE: 76 BPM | SYSTOLIC BLOOD PRESSURE: 142 MMHG | BODY MASS INDEX: 27.77 KG/M2 | TEMPERATURE: 98 F | DIASTOLIC BLOOD PRESSURE: 72 MMHG | WEIGHT: 205 LBS

## 2018-04-03 DIAGNOSIS — I20.9 ANGINA, CLASS III: ICD-10-CM

## 2018-04-03 DIAGNOSIS — I50.9 ACUTE CONGESTIVE HEART FAILURE, UNSPECIFIED CONGESTIVE HEART FAILURE TYPE: ICD-10-CM

## 2018-04-03 DIAGNOSIS — Z95.1 PRESENCE OF AORTOCORONARY BYPASS GRAFT: ICD-10-CM

## 2018-04-03 LAB
ABO + RH BLD: NORMAL
ANION GAP SERPL CALC-SCNC: 6 MMOL/L
BASOPHILS # BLD AUTO: 0.04 K/UL
BASOPHILS NFR BLD: 0.7 %
BLD GP AB SCN CELLS X3 SERPL QL: NORMAL
BUN SERPL-MCNC: 19 MG/DL
CALCIUM SERPL-MCNC: 9.1 MG/DL
CHLORIDE SERPL-SCNC: 102 MMOL/L
CO2 SERPL-SCNC: 27 MMOL/L
CORONARY STENOSIS: ABNORMAL
CREAT SERPL-MCNC: 1.4 MG/DL
DIFFERENTIAL METHOD: NORMAL
EOSINOPHIL # BLD AUTO: 0.1 K/UL
EOSINOPHIL NFR BLD: 1.7 %
ERYTHROCYTE [DISTWIDTH] IN BLOOD BY AUTOMATED COUNT: 12 %
EST. GFR  (AFRICAN AMERICAN): >60 ML/MIN/1.73 M^2
EST. GFR  (NON AFRICAN AMERICAN): 52 ML/MIN/1.73 M^2
GLUCOSE SERPL-MCNC: 245 MG/DL
HCT VFR BLD AUTO: 43.6 %
HGB BLD-MCNC: 14.5 G/DL
IMM GRANULOCYTES # BLD AUTO: 0.01 K/UL
IMM GRANULOCYTES NFR BLD AUTO: 0.2 %
LYMPHOCYTES # BLD AUTO: 1.9 K/UL
LYMPHOCYTES NFR BLD: 32.3 %
MCH RBC QN AUTO: 30.1 PG
MCHC RBC AUTO-ENTMCNC: 33.3 G/DL
MCV RBC AUTO: 91 FL
MONOCYTES # BLD AUTO: 0.5 K/UL
MONOCYTES NFR BLD: 9 %
NEUTROPHILS # BLD AUTO: 3.4 K/UL
NEUTROPHILS NFR BLD: 56.1 %
NRBC BLD-RTO: 0 /100 WBC
PLATELET # BLD AUTO: 160 K/UL
PMV BLD AUTO: 10.4 FL
POCT GLUCOSE: 235 MG/DL (ref 70–110)
POTASSIUM SERPL-SCNC: 3.9 MMOL/L
RBC # BLD AUTO: 4.81 M/UL
SODIUM SERPL-SCNC: 135 MMOL/L
WBC # BLD AUTO: 6.01 K/UL

## 2018-04-03 PROCEDURE — 93571 IV DOP VEL&/PRESS C FLO 1ST: CPT | Mod: 26,LC,, | Performed by: INTERNAL MEDICINE

## 2018-04-03 PROCEDURE — 80048 BASIC METABOLIC PNL TOTAL CA: CPT

## 2018-04-03 PROCEDURE — 93459 L HRT ART/GRFT ANGIO: CPT | Mod: 26,,, | Performed by: INTERNAL MEDICINE

## 2018-04-03 PROCEDURE — 63600175 PHARM REV CODE 636 W HCPCS

## 2018-04-03 PROCEDURE — 25000003 PHARM REV CODE 250: Performed by: INTERNAL MEDICINE

## 2018-04-03 PROCEDURE — 86901 BLOOD TYPING SEROLOGIC RH(D): CPT

## 2018-04-03 PROCEDURE — 99152 MOD SED SAME PHYS/QHP 5/>YRS: CPT | Mod: ,,, | Performed by: INTERNAL MEDICINE

## 2018-04-03 PROCEDURE — 93572 IV DOP VEL&/PRESS C FLO EA: CPT | Mod: 26,LC,, | Performed by: INTERNAL MEDICINE

## 2018-04-03 PROCEDURE — 82962 GLUCOSE BLOOD TEST: CPT

## 2018-04-03 PROCEDURE — C1887 CATHETER, GUIDING: HCPCS

## 2018-04-03 PROCEDURE — 93005 ELECTROCARDIOGRAM TRACING: CPT | Mod: 59

## 2018-04-03 PROCEDURE — 25000003 PHARM REV CODE 250

## 2018-04-03 PROCEDURE — 93010 ELECTROCARDIOGRAM REPORT: CPT | Mod: ,,, | Performed by: INTERNAL MEDICINE

## 2018-04-03 PROCEDURE — 85025 COMPLETE CBC W/AUTO DIFF WBC: CPT

## 2018-04-03 RX ORDER — DIPHENHYDRAMINE HCL 50 MG
50 CAPSULE ORAL ONCE
Status: COMPLETED | OUTPATIENT
Start: 2018-04-03 | End: 2018-04-03

## 2018-04-03 RX ORDER — SODIUM CHLORIDE 9 MG/ML
3 INJECTION, SOLUTION INTRAVENOUS CONTINUOUS
Status: ACTIVE | OUTPATIENT
Start: 2018-04-03 | End: 2018-04-03

## 2018-04-03 RX ORDER — ISOSORBIDE MONONITRATE 30 MG/1
90 TABLET, EXTENDED RELEASE ORAL DAILY
Qty: 360 TABLET | Refills: 0 | Status: SHIPPED | OUTPATIENT
Start: 2018-04-03 | End: 2018-04-03 | Stop reason: HOSPADM

## 2018-04-03 RX ORDER — ISOSORBIDE MONONITRATE 30 MG/1
30 TABLET, EXTENDED RELEASE ORAL DAILY
Qty: 30 TABLET | Refills: 11 | Status: ON HOLD | OUTPATIENT
Start: 2018-04-03 | End: 2023-11-08 | Stop reason: HOSPADM

## 2018-04-03 RX ADMIN — DIPHENHYDRAMINE HYDROCHLORIDE 50 MG: 50 CAPSULE ORAL at 08:04

## 2018-04-03 RX ADMIN — SODIUM CHLORIDE 3 ML/KG/HR: 0.9 INJECTION, SOLUTION INTRAVENOUS at 08:04

## 2018-04-03 NOTE — NURSING
Ambulated in hallway without bleed or hematoma.  Tolerated well.  Denies needs at this time.  Will continue to monitor.

## 2018-04-03 NOTE — H&P (VIEW-ONLY)
Subjective:    Patient ID:  Sherif Knutson Jr. is a 67 y.o. male who presents for evaluation of Coronary Artery Disease    Referring cardiologist: Dr Ricky CHRISTINE  Mr. Knutson s a 66 y/o gentleman with a h/o CAD (s/p CABG and left subclavian artery stent) who reports 2 months of new onset orthopnea and pND such that he has started sleeping in a lounge chair.  He started taking lasix 20mg pRN which resulted in resolution of these symptoms.  Howver he also also been experiencing chest heaviness for two months that radiates to his left shoulder.  Thsi symptom is relieved with NTG SL X 1 and brought on with exertion such as playing baseball with his grandson.  He denies LE edema.  He denies palpitations or syncope, but reports 2 episodes of lightheadedness 2 weeks ago when strainign upon pulling an engine out of a car.  He reports good medication compliance.  He tries to adhere to a heart healthy diet.    Past Medical History:   Diagnosis Date    CHF (congestive heart failure) 8/29/2017    Coronary artery disease 2006, 5/2013    s/p pci to rca (2006), LCx (5/2013)    DM (diabetes mellitus)     HLD (hyperlipidemia)     HTN (hypertension)     Kidney stone     EMELI (obstructive sleep apnea) 11/2/2015    Subclavian artery stenosis, left      Past Surgical History:   Procedure Laterality Date    BACK SURGERY      CHOLECYSTECTOMY  2011    CORONARY ANGIOPLASTY      CORONARY ARTERY BYPASS GRAFT  2000    5 bypass surgeries    TONSILLECTOMY       Current Outpatient Prescriptions on File Prior to Visit   Medication Sig Dispense Refill    aspirin (ECOTRIN) 81 MG EC tablet Take 325 mg by mouth once daily.       clopidogrel (PLAVIX) 75 mg tablet Take 600mg po X1 today and then 75mg po qday 30 tablet 11    CYANOCOBALAMIN, VITAMIN B-12, (VITAMIN B-12 ORAL) Take by mouth.      isosorbide mononitrate (IMDUR) 60 MG 24 hr tablet Take 1 tablet (60 mg total) by mouth once daily. 30 tablet 0    liraglutide 0.6 mg/0.1 mL, 18 mg/3  "mL, subq PNIJ (VICTOZA 3-ANDRE) 0.6 mg/0.1 mL (18 mg/3 mL) PnIj Inject 1.8 mg into the skin once daily.      LYRICA 100 mg capsule 2 (two) times daily.       metoprolol tartrate (LOPRESSOR) 50 MG tablet Take 1 tablet (50 mg total) by mouth 2 (two) times daily. (Patient taking differently: Take 50 mg by mouth once daily. ) 60 tablet 0    midodrine (PROAMATINE) 2.5 MG Tab Take 1 tablet (2.5 mg total) by mouth 3 (three) times daily. 90 tablet 6    NOVOLOG FLEXPEN 100 unit/mL InPn pen as needed.       oxycodone-acetaminophen (PERCOCET)  mg per tablet Take 1 tablet by mouth every 6 (six) hours as needed for Pain. 40 tablet 0    ranitidine (ZANTAC) 300 MG tablet       rosuvastatin (CRESTOR) 40 MG Tab Take 1 tablet (40 mg total) by mouth every evening. 30 tablet 6    sub-q insulin device, 20 unit Lashay by Misc.(Non-Drug; Combo Route) route.      BD ULTRA-FINE JERMAINE PEN NEEDLES 32 gauge x 5/32" Ndle       EASY TOUCH 31 X 5/16 " Ndle       furosemide (LASIX) 20 MG tablet Take 1 tablet (20 mg total) by mouth once daily. 30 tablet 6    nitroGLYCERIN (NITROSTAT) 0.4 MG SL tablet Place 1 tablet (0.4 mg total) under the tongue every 5 (five) minutes as needed for Chest pain. 20 tablet 0    NOVOLOG 100 unit/mL injection       SURE COMFORT INSULIN SYRINGE 1/2 mL 31 x 5/16" Syrg        No current facility-administered medications on file prior to visit.      .alelr  Social History   Substance Use Topics    Smoking status: Never Smoker    Smokeless tobacco: Never Used    Alcohol use No     Family History   Problem Relation Age of Onset    Heart disease Mother     Heart attack Mother     Heart disease Father     Heart attack Father     Hypertension Brother     Heart disease Paternal Uncle     Prostate cancer Neg Hx     Kidney disease Neg Hx        Review of Systems   Constitution: Negative for decreased appetite, diaphoresis, fever, malaise/fatigue, weight gain and weight loss.   HENT: Negative for " congestion, nosebleeds and sore throat.    Eyes: Negative for blurred vision, vision loss in left eye, vision loss in right eye and visual disturbance.   Cardiovascular: Positive for chest pain, dyspnea on exertion, orthopnea and paroxysmal nocturnal dyspnea. Negative for claudication, leg swelling, near-syncope, palpitations and syncope.   Respiratory: Negative for cough, hemoptysis, shortness of breath and wheezing.    Endocrine: Negative for polyuria.   Hematologic/Lymphatic: Does not bruise/bleed easily.   Skin: Negative for nail changes and rash.   Musculoskeletal: Negative for back pain, muscle cramps and myalgias.   Gastrointestinal: Negative for abdominal pain, change in bowel habit, diarrhea, heartburn, hematemesis, hematochezia, melena, nausea and vomiting.   Genitourinary: Negative for bladder incontinence, dysuria, frequency and hematuria.   Psychiatric/Behavioral: Negative for depression.   Allergic/Immunologic: Negative for hives.        Objective:  Vitals:    03/15/18 1357 03/15/18 1359   BP: 120/61 125/64   BP Location: Left arm Right arm   Patient Position: Sitting Sitting   BP Method: Large (Automatic) Large (Automatic)   Pulse: 69 71   SpO2: 95%    Weight: 95.4 kg (210 lb 5.1 oz)    Height: 6' (1.829 m)          Physical Exam   Constitutional: He is oriented to person, place, and time. He appears well-developed and well-nourished.   HENT:   Head: Normocephalic and atraumatic.   Eyes: EOM are normal. Pupils are equal, round, and reactive to light.   Neck: Neck supple. No JVD present. No thyromegaly present.   Cardiovascular: Normal rate and regular rhythm.  PMI is displaced.  Exam reveals no gallop and no friction rub.    No murmur heard.  Pulses:       Carotid pulses are 2+ on the right side, and 2+ on the left side.       Radial pulses are 2+ on the right side, and 2+ on the left side.        Femoral pulses are 2+ on the right side, and 2+ on the left side.       Dorsalis pedis pulses are 2+ on  the right side, and 2+ on the left side.        Posterior tibial pulses are 2+ on the right side, and 2+ on the left side.   Pulmonary/Chest: Effort normal. He has no wheezes. He has no rhonchi. He has no rales.   Abdominal: Soft. Normal appearance. He exhibits no distension. There is no hepatosplenomegaly. There is no tenderness.   Neurological: He is alert and oriented to person, place, and time. Gait normal.   Psychiatric: He has a normal mood and affect.         Assessment:       1. Angina, class III    2. Acute congestive heart failure, unspecified congestive heart failure type    3. Atherosclerosis of autologous vein coronary artery bypass graft with stable angina pectoris    4. Dyslipidemia    5. Type 2 diabetes mellitus with diabetic peripheral angiopathy without gangrene, with long-term current use of insulin    6. S/P CABG x 5 in 2000    7. S/P subclavian artery angioplasty with stent placement in 8/2013         Plan:       1) CAD.  The patient reports symptoms concerning for CCS 3 angina as well as NYHA class 3 symptoms.  It is likely that the patient has developed de radha coronary stenosis, ISR of his coronary stents, or a combination of these problems.  Therefore rec proceeding with cardiac catheterization and possible PCI.  -continue EC ASA 81mg po qday  -continue 75mg po q day  -continue Toprol XL 25mgpo q day  -6Fr right CFA access; will perform diagnostic LH and possible PCI .  The risks, benefits, and alternatives of cardiac catheterization and possible intervention were discussed with the patient.  All questions were answered and informed consent was obtained.  -use of NTG SL reviwewed with patient; if he experiences angina not relieved with NTG SL X3, then he will call 911     2) NYHA class 3 symptoms. Patient appears euvolemic by exam in clinic today, but reports new onset NYHA class 3 symptoms relieved with lasix; will get TTE to evaluate LVEF     3) Dyslipidemia. Continue statin therapy     4)  CKD3. IV hydration pre-procedure; encourage po hydration post procedure; Visipaque contrast     5) DM2. 7/31/17 OovW6x=2/6; rec tight glycemic control;     All of the patient's and his wife's questions were answered.

## 2018-04-03 NOTE — NURSING
Ambulated on unit this am with wife at side.  C/o pain to left foot.  Verbalized understanding of procedure.  resp even and unlabored.  Pt states he drank a bottole of water this morning at 0700.  Notified MJ Parra.  Pt states he takes plavix and ASA 325mg.  Pt states he is in a medical study that requires him to take ASA 325mg.  Will continue to monitor.

## 2018-04-03 NOTE — NURSING
Awake and alert.  Right groin soft.  No bleeding or hematoma noted.  Tolerated fluids and food.  Family at pt side.  Denies pain or SOB.  Will continue to monitor.

## 2018-04-03 NOTE — HPI
Mr. Knutson s a 66 y/o gentleman with a h/o CAD (s/p CABG and left subclavian artery stent) who reports 2 months of new onset orthopnea and pND such that he has started sleeping in a lounge chair.  He started taking lasix 20mg pRN which resulted in resolution of these symptoms.  Howver he also also been experiencing chest heaviness for two months that radiates to his left shoulder.  Thsi symptom is relieved with NTG SL X 1 and brought on with exertion such as playing baseball with his grandson.  He denies LE edema.  He denies palpitations or syncope, but reports 2 episodes of lightheadedness 2 weeks ago when strainign upon pulling an engine out of a car.  He reports good medication compliance.  He tries to adhere to a heart healthy diet.

## 2018-04-03 NOTE — NURSING
Family at side.  Tolerating fluids.  No c/o pain or SOB.  VSS.  Right groin remains soft.  No bleeding or hematoma noted to right groin.

## 2018-04-03 NOTE — PLAN OF CARE
Received report from MJ Austin. Patient s/p [facility], AAOx3. VSS, no c/o pain or discomfort at this time, resp even and unlabored. Gauze/tegaderm dressing to r groin is CDI. No active bleeding. No hematoma noted. Post procedure protocol reviewed with patient and patient's family. Understanding verbalized. Family members at bedside. Nurse call bell within reach. Will continue to monitor per post procedure protocol.

## 2018-04-03 NOTE — NURSING
IV's d/c'd x 2.  VSS.  Right groin remains soft.  No bleeding or hematoma noted.  Pt and wife verbalized understanding of home instructions.  trf off unit via wheelchair for discharge home.

## 2018-04-03 NOTE — HOSPITAL COURSE
Coronary angiogram done, no intervention.  Was not on imdur for the past year therefore we will prescribe Imdur 30mg daily.

## 2018-04-03 NOTE — DISCHARGE SUMMARY
[facility]  Interventional Cardiology  Discharge Summary      Patient Name: Sherif Knutson Jr.  MRN: [id number]  Admission Date: 4/3/2018  Hospital Length of Stay: 0 days  Discharge Date and Time:  04/03/2018 3:14 PM  Attending Physician: Juancho Black MD  Discharging Provider: Silas Diego MD  Primary Care Physician: Jose F Chicas MD    HPI:  Mr. Knutson s a 66 y/o gentleman with a h/o CAD (s/p CABG and left subclavian artery stent) who reports 2 months of new onset orthopnea and pND such that he has started sleeping in a lounge chair.  He started taking lasix 20mg pRN which resulted in resolution of these symptoms.  Howver he also also been experiencing chest heaviness for two months that radiates to his left shoulder.  Thsi symptom is relieved with NTG SL X 1 and brought on with exertion such as playing baseball with his grandson.  He denies LE edema.  He denies palpitations or syncope, but reports 2 episodes of lightheadedness 2 weeks ago when strainign upon pulling an engine out of a car.  He reports good medication compliance.  He tries to adhere to a heart healthy diet.    Procedure(s) (LRB):  Left heart cath (N/A)     Indwelling Lines/Drains at time of discharge:  Lines/Drains/Airways          No matching active lines, drains, or airways          Hospital Course:  Coronary angiogram done, no intervention.  Was not on imdur for the past year therefore we will prescribe Imdur 30mg daily.          Significant Diagnostic Studies: Angiography: As above     Pending Diagnostic Studies:     None        No new Assessment & Plan notes have been filed under this hospital service since the last note was generated.  Service: Interventional Cardiology      Discharged Condition: good  Diet: heart healthy  Activity: as tolerated    Follow Up:  Interventional cardiology clinic in 2 weeks    Patient Instructions:   No discharge procedures on file.  Medications:  Reconciled Home Medications:     "  Medication List      CHANGE how you take these medications    isosorbide mononitrate 30 MG 24 hr tablet  Commonly known as:  IMDUR  Take 1 tablet (30 mg total) by mouth once daily.  What changed:  · medication strength  · how much to take        CONTINUE taking these medications    aspirin 81 MG EC tablet  Commonly known as:  ECOTRIN     clopidogrel 75 mg tablet  Commonly known as:  PLAVIX  Take 600mg po X1 today and then 75mg po qday     EASY TOUCH 31 gauge x 5/16" Ndle  Generic drug:  pen needle, diabetic     BD ULTRA-FINE JERMAINE PEN NEEDLES 32 gauge x 5/32" Ndle  Generic drug:  pen needle, diabetic     furosemide 20 MG tablet  Commonly known as:  LASIX  Take 1 tablet (20 mg total) by mouth once daily.     LYRICA 100 MG capsule  Generic drug:  pregabalin     midodrine 2.5 MG Tab  Commonly known as:  PROAMATINE  Take 1 tablet (2.5 mg total) by mouth 3 (three) times daily.     nitroGLYCERIN 0.4 MG SL tablet  Commonly known as:  NITROSTAT  Place 1 tablet (0.4 mg total) under the tongue every 5 (five) minutes as needed for Chest pain.     * NovoLOG Flexpen U-100 Insulin 100 unit/mL Inpn pen  Generic drug:  insulin aspart U-100     * NovoLOG U-100 Insulin aspart 100 unit/mL injection  Generic drug:  insulin aspart U-100     oxyCODONE-acetaminophen  mg per tablet  Commonly known as:  PERCOCET  Take 1 tablet by mouth every 6 (six) hours as needed for Pain.     ranitidine 300 MG tablet  Commonly known as:  ZANTAC     rosuvastatin 40 MG Tab  Commonly known as:  CRESTOR  Take 1 tablet (40 mg total) by mouth every evening.     sub-q insulin device, 20 unit Lashay     SURE COMFORT INSULIN SYRINGE 0.5 mL 31 gauge x 5/16 Syrg  Generic drug:  insulin syringe-needle U-100     VICTOZA 3-ANDRE 0.6 mg/0.1 mL (18 mg/3 mL) Pnij  Generic drug:  liraglutide 0.6 mg/0.1 mL (18 mg/3 mL) subq PNIJ     VITAMIN B-12 ORAL        * This list has 2 medication(s) that are the same as other medications prescribed for you. Read the directions " carefully, and ask your doctor or other care provider to review them with you.               Where to Get Your Medications      These medications were sent to [facility] - [id number]  [address] [id number]    Phone:  [phone]   · isosorbide mononitrate 30 MG 24 hr tablet         Time spent on the discharge of patient: 20 minutes    Silas Diego MD  Interventional Cardiology  [facility]

## 2018-04-03 NOTE — INTERVAL H&P NOTE
The patient has been examined and the H&P has been reviewed:    I concur with the findings and no changes have occurred since H&P was written.    Anesthesia/Surgery risks, benefits and alternative options discussed and understood by patient/family.        I have personally taken the history and examined this patient and agree with the resident's note as stated above.  Patient presents for cardiac cath and possible PCI after developing new onset angina and having an abnormal PET stress test.  All of the patient's questions were answered.

## 2018-04-03 NOTE — PROGRESS NOTES
Post Cath Note  Referring Physician: Juancho Black MD  Procedure: Left heart cath (N/A)       Access: Right CFA    LVEDP: 18    See full report for further details    Intervention:   NO intervention.  Proximal LCX 60%  Ffr 0.94    Om3- 60% FFR .0.89    See full report for details   Closure device: Mynx    Post Cath Exam:   /67 (BP Location: Left arm, Patient Position: Sitting)   Pulse 71   Temp 97.9 °F (36.6 °C)   Resp 16   Ht 6' (1.829 m)   Wt 93 kg (205 lb)   SpO2 (!) 94%   BMI 27.80 kg/m²   No unusual pain, hematoma, thrill or bruit at vascular access site.  Distal pulse present without signs of ischemia.    Reassessed right groin, no hematoma on palpation, no femoral bruits on auscultation.    Recommendations:   - Routine post-cath care  - IVF at 3cc/kg for 4 hours   -Will increase imdur to 90mg daily   -Can be discharged after IV fluid protocol    Silas Diego DO  Cardiology Fellow

## 2018-04-04 LAB
POC ACTIVATED CLOTTING TIME K: 136 SEC (ref 74–137)
SAMPLE: NORMAL

## 2018-05-03 ENCOUNTER — OFFICE VISIT (OUTPATIENT)
Dept: CARDIOLOGY | Facility: CLINIC | Age: 68
End: 2018-05-03
Payer: MEDICARE

## 2018-05-03 VITALS
OXYGEN SATURATION: 94 % | WEIGHT: 207.69 LBS | HEIGHT: 72 IN | SYSTOLIC BLOOD PRESSURE: 128 MMHG | DIASTOLIC BLOOD PRESSURE: 71 MMHG | BODY MASS INDEX: 28.13 KG/M2 | HEART RATE: 71 BPM

## 2018-05-03 DIAGNOSIS — E11.51 TYPE 2 DIABETES MELLITUS WITH DIABETIC PERIPHERAL ANGIOPATHY WITHOUT GANGRENE, WITH LONG-TERM CURRENT USE OF INSULIN: ICD-10-CM

## 2018-05-03 DIAGNOSIS — Z79.4 TYPE 2 DIABETES MELLITUS WITH DIABETIC PERIPHERAL ANGIOPATHY WITHOUT GANGRENE, WITH LONG-TERM CURRENT USE OF INSULIN: ICD-10-CM

## 2018-05-03 DIAGNOSIS — I25.10 CORONARY ARTERY DISEASE INVOLVING NATIVE CORONARY ARTERY OF NATIVE HEART WITHOUT ANGINA PECTORIS: ICD-10-CM

## 2018-05-03 DIAGNOSIS — G47.33 OSA (OBSTRUCTIVE SLEEP APNEA): Primary | ICD-10-CM

## 2018-05-03 PROCEDURE — 3078F DIAST BP <80 MM HG: CPT | Mod: CPTII,S$GLB,, | Performed by: INTERNAL MEDICINE

## 2018-05-03 PROCEDURE — 99999 PR PBB SHADOW E&M-EST. PATIENT-LVL III: CPT | Mod: PBBFAC,,, | Performed by: INTERNAL MEDICINE

## 2018-05-03 PROCEDURE — 3045F PR MOST RECENT HEMOGLOBIN A1C LEVEL 7.0-9.0%: CPT | Mod: CPTII,S$GLB,, | Performed by: INTERNAL MEDICINE

## 2018-05-03 PROCEDURE — 3074F SYST BP LT 130 MM HG: CPT | Mod: CPTII,S$GLB,, | Performed by: INTERNAL MEDICINE

## 2018-05-03 PROCEDURE — 99213 OFFICE O/P EST LOW 20 MIN: CPT | Mod: S$GLB,,, | Performed by: INTERNAL MEDICINE

## 2018-05-06 PROBLEM — I50.9 ACUTE CONGESTIVE HEART FAILURE: Status: RESOLVED | Noted: 2018-04-03 | Resolved: 2018-05-06

## 2018-05-06 PROBLEM — I20.9 ANGINA, CLASS III: Status: RESOLVED | Noted: 2017-07-24 | Resolved: 2018-05-06

## 2018-05-06 PROBLEM — I50.9 CHF (CONGESTIVE HEART FAILURE): Status: RESOLVED | Noted: 2017-08-29 | Resolved: 2018-05-06

## 2018-05-06 PROBLEM — I25.10 CORONARY ARTERY DISEASE INVOLVING NATIVE CORONARY ARTERY OF NATIVE HEART WITHOUT ANGINA PECTORIS: Status: ACTIVE | Noted: 2018-05-06

## 2018-05-06 NOTE — PROGRESS NOTES
Subjective:    Patient ID:  Sherif Knutson Jr. is a 67 y.o. male who presents for follow-up of Coronary Artery Disease    Referring cardiologist: Dr. Ricky CHRISTINE  Mr. Knutson s a 66 y/o gentleman with a h/o CAD (s/p CABG and left subclavian artery stent) who underwent cardiac cath on 4/3/18 after reporting new onset angina.  His Imdur 30mg po qday was restarted  Since then he reports that he has not experienced angina. He reports shrotness of breath 1-2 times a day associated with smelling his wife's cooking and relieved with going outside.  He denies LE edema except for left ankle swelling since he sprained his ankle stepping of a bleacher at a softball game. He reports chronic 2 pillow orthopnea, but denies PND. He denies palpitation, syncope, or near syncope. The patient does report a h/o daytime fatigue such that he easily falls asleep while sitting in a chair.  He reports good medication compliance.  He tries to adhere to a heart healthy diet.     Past Medical History:   Diagnosis Date    CHF (congestive heart failure) 8/29/2017    Coronary artery disease 2006, 5/2013    s/p pci to rca (2006), LCx (5/2013)    DM (diabetes mellitus)     HLD (hyperlipidemia)     HTN (hypertension)     Kidney stone     EMELI (obstructive sleep apnea) 11/2/2015    Subclavian artery stenosis, left      Past Surgical History:   Procedure Laterality Date    BACK SURGERY      CHOLECYSTECTOMY  2011    CORONARY ANGIOPLASTY      CORONARY ARTERY BYPASS GRAFT  2000    5 bypass surgeries    TONSILLECTOMY       Current Outpatient Prescriptions on File Prior to Visit   Medication Sig Dispense Refill    aspirin (ECOTRIN) 81 MG EC tablet Take 325 mg by mouth once daily.       clopidogrel (PLAVIX) 75 mg tablet Take 600mg po X1 today and then 75mg po qday 30 tablet 11    CYANOCOBALAMIN, VITAMIN B-12, (VITAMIN B-12 ORAL) Take by mouth.      furosemide (LASIX) 20 MG tablet Take 1 tablet (20 mg total) by mouth once daily. 30 tablet 6     "isosorbide mononitrate (IMDUR) 30 MG 24 hr tablet Take 1 tablet (30 mg total) by mouth once daily. 30 tablet 11    liraglutide 0.6 mg/0.1 mL, 18 mg/3 mL, subq PNIJ (VICTOZA 3-ANDRE) 0.6 mg/0.1 mL (18 mg/3 mL) PnIj Inject 1.8 mg into the skin once daily.      LYRICA 100 mg capsule 2 (two) times daily.       midodrine (PROAMATINE) 2.5 MG Tab Take 1 tablet (2.5 mg total) by mouth 3 (three) times daily. 90 tablet 6    NOVOLOG 100 unit/mL injection       NOVOLOG FLEXPEN 100 unit/mL InPn pen as needed.       ranitidine (ZANTAC) 300 MG tablet 300 mg 2 (two) times daily.       rosuvastatin (CRESTOR) 40 MG Tab Take 1 tablet (40 mg total) by mouth every evening. 30 tablet 6    BD ULTRA-FINE JERMAINE PEN NEEDLES 32 gauge x 5/32" Ndle       EASY TOUCH 31 X 5/16 " Ndle       nitroGLYCERIN (NITROSTAT) 0.4 MG SL tablet Place 1 tablet (0.4 mg total) under the tongue every 5 (five) minutes as needed for Chest pain. 20 tablet 0    oxycodone-acetaminophen (PERCOCET)  mg per tablet Take 1 tablet by mouth every 6 (six) hours as needed for Pain. 40 tablet 0    sub-q insulin device, 20 unit Lashay by Misc.(Non-Drug; Combo Route) route.      SURE COMFORT INSULIN SYRINGE 1/2 mL 31 x 5/16" Syrg        No current facility-administered medications on file prior to visit.      Review of patient's allergies indicates:   Allergen Reactions    Sulfa (sulfonamide antibiotics) Photosensitivity    Zocor [simvastatin] Other (See Comments)     lathargic     Social History   Substance Use Topics    Smoking status: Never Smoker    Smokeless tobacco: Never Used    Alcohol use No     Family History   Problem Relation Age of Onset    Heart disease Mother     Heart attack Mother     Heart disease Father     Heart attack Father     Hypertension Brother     Heart disease Paternal Uncle     Prostate cancer Neg Hx     Kidney disease Neg Hx        Review of Systems   Constitution: Negative for decreased appetite, diaphoresis, fever, " malaise/fatigue, weight gain and weight loss.   HENT: Negative for congestion, nosebleeds and sore throat.    Eyes: Negative for blurred vision, vision loss in left eye, vision loss in right eye and visual disturbance.   Cardiovascular: Negative for chest pain, claudication, dyspnea on exertion, leg swelling, near-syncope, orthopnea, palpitations, paroxysmal nocturnal dyspnea and syncope.   Respiratory: Negative for cough, hemoptysis, shortness of breath and wheezing.    Endocrine: Negative for polyuria.   Hematologic/Lymphatic: Does not bruise/bleed easily.   Skin: Negative for nail changes and rash.   Musculoskeletal: Negative for back pain, muscle cramps and myalgias.   Gastrointestinal: Negative for abdominal pain, change in bowel habit, diarrhea, heartburn, hematemesis, hematochezia, melena, nausea and vomiting.   Genitourinary: Negative for bladder incontinence, dysuria, frequency and hematuria.   Psychiatric/Behavioral: Negative for depression.   Allergic/Immunologic: Negative for hives.        Objective:  Vitals:    05/03/18 1029 05/03/18 1031   BP: 131/70 128/71   BP Location: Left arm Right arm   Patient Position: Sitting Sitting   BP Method: Large (Automatic) Large (Automatic)   Pulse: 68 71   SpO2: (!) 94%    Weight: 94.2 kg (207 lb 10.8 oz)    Height: 6' (1.829 m)          Physical Exam   Constitutional: He is oriented to person, place, and time. He appears well-developed and well-nourished.   HENT:   Head: Normocephalic and atraumatic.   Eyes: EOM are normal. Pupils are equal, round, and reactive to light.   Neck: Neck supple. No JVD present. No thyromegaly present.   Cardiovascular: Normal rate, regular rhythm and normal heart sounds.  PMI is displaced.  Exam reveals no gallop and no friction rub.    No murmur heard.  Pulses:       Carotid pulses are 2+ on the right side, and 2+ on the left side.       Radial pulses are 2+ on the right side, and 2+ on the left side.        Femoral pulses are 2+ on the  right side, and 2+ on the left side.       Dorsalis pedis pulses are 2+ on the right side, and 2+ on the left side.        Posterior tibial pulses are 2+ on the right side, and 2+ on the left side.   Pulmonary/Chest: Effort normal and breath sounds normal. He has no wheezes. He has no rhonchi. He has no rales.   Abdominal: Soft. Normal appearance and bowel sounds are normal. He exhibits no distension. There is no hepatosplenomegaly. There is no tenderness.   Musculoskeletal: He exhibits no edema.   Neurological: He is alert and oriented to person, place, and time. Gait normal.   Skin: Skin is warm and dry. No erythema.   Psychiatric: He has a normal mood and affect.         Assessment:       1. EMELI (obstructive sleep apnea)    2. Coronary artery disease involving native coronary artery of native heart without angina pectoris    3. Type 2 diabetes mellitus with diabetic peripheral angiopathy without gangrene, with long-term current use of insulin         Plan:       1) CAD.  The patient is free of anginal symptoms and reports NYHA class 2 CHF symptoms  -continue EC ASA 81mg po qday  -continue 75mg po q day  -continue Toprol XL 25mgpo q day  -continue Imdur 30mg po qday     2) NYHA class 2 symptoms. Patient appears euvolemic by exam in clinic today    3) Possible sleep disorder.  The patient has a high STOP-BANG score; will refer to sleep clinic for further evaluation    4) Dyslipidemia. Continue statin therapy     5) CKD3. Avoid nephrotoxic medications     6) DM2. 7/31/17 AorD8w=3/6; rec tight glycemic control;      All of the patient's and his wife's questions were answered.

## 2018-05-22 ENCOUNTER — OFFICE VISIT (OUTPATIENT)
Dept: SLEEP MEDICINE | Facility: CLINIC | Age: 68
End: 2018-05-22
Payer: MEDICARE

## 2018-05-22 VITALS
DIASTOLIC BLOOD PRESSURE: 74 MMHG | WEIGHT: 204.81 LBS | HEART RATE: 84 BPM | BODY MASS INDEX: 27.74 KG/M2 | SYSTOLIC BLOOD PRESSURE: 102 MMHG | HEIGHT: 72 IN

## 2018-05-22 DIAGNOSIS — R09.81 NASAL CONGESTION: ICD-10-CM

## 2018-05-22 DIAGNOSIS — J34.2 DEVIATED SEPTUM: ICD-10-CM

## 2018-05-22 DIAGNOSIS — G47.33 OSA (OBSTRUCTIVE SLEEP APNEA): Primary | ICD-10-CM

## 2018-05-22 PROCEDURE — 99214 OFFICE O/P EST MOD 30 MIN: CPT | Mod: S$GLB,,, | Performed by: NURSE PRACTITIONER

## 2018-05-22 PROCEDURE — 3074F SYST BP LT 130 MM HG: CPT | Mod: CPTII,S$GLB,, | Performed by: NURSE PRACTITIONER

## 2018-05-22 PROCEDURE — 99999 PR PBB SHADOW E&M-EST. PATIENT-LVL IV: CPT | Mod: PBBFAC,,, | Performed by: NURSE PRACTITIONER

## 2018-05-22 PROCEDURE — 3078F DIAST BP <80 MM HG: CPT | Mod: CPTII,S$GLB,, | Performed by: NURSE PRACTITIONER

## 2018-05-22 NOTE — PROGRESS NOTES
"Sherif Knutson Jr. seen in follow-up for EMELI management. Last seen in clinic by Dr. Chapin 11/03/2015. This is his initial visit with me.     INTERVAL HISTORY:    05/22/2018 IDRIS Bowman NP: Since last clinic visit, pt has completed sleep study and tried CPAP. Per pt he could not tolerate CPAP largely due to severe nasal congestion and pressure intolerance due to deviated septum. He ultimately had to return CPAP machine due to non-compliance. Would like to try CPAP again since snoring, gasping, and excessive daytime sleepiness continues. But, concerned that he will again have same pressure intolerance due to nasal issues.   EPWORTH SLEEPINESS SCALE 5/22/2018   Sitting and reading 3   Watching TV 3   Sitting, inactive in a public place (e.g. a theatre or a meeting) 0   As a passenger in a car for an hour without a break 3   Lying down to rest in the afternoon when circumstances permit 2   Sitting and talking to someone 0   Sitting quietly after a lunch without alcohol 3   In a car, while stopped for a few minutes in traffic 1   Total score 15       11/03/2015 Dr. Chapin This 67 y.o. male patient presents for the evaluation of possible obstructive sleep apnea. Sent by cards, seen for prior CABG, chronic diastolic dysnfunction, EF 60% prior echo a year ago; Daytime dyspnea.    Low energy and short of breath druing the day    Stops breathing when he dozes off during the day  Excessively sleepy at night  Difficulty with sleep due to arthritis at night  Snoring, intermittent, gasping noises at night  Spends 12 hours in bed, but feels he only sleeps 4-5 hours total    ESS = 15    Sinus problems; Difficulty with nasal breathing all day long (left > right)  Use a nasal spray "[employer]" OTC nightly - phenylephrine    SLEEP ROUTINE:   Bed partner: wife   Time to bed: 10 pm   Sleep onset latency: hour with TV   Disruptions or awakenings: 6 times   Wakeup time: 10 am   Perceived sleep quality: poor   Daytime naps: " unintentional    Baseline Sleep Study: 11/19/2018  lb. The overall AHI was 18 and overall RDI was 60. The oxygen gardenia was 83.3 % and % time < 90% SpO2 was 3.1%.    Past Medical History:   Diagnosis Date    CHF (congestive heart failure) 8/29/2017    Coronary artery disease 2006, 5/2013    s/p pci to rca (2006), LCx (5/2013)    DM (diabetes mellitus)     HLD (hyperlipidemia)     HTN (hypertension)     Kidney stone     EMELI (obstructive sleep apnea) 11/2/2015    Subclavian artery stenosis, left        Past Surgical History:   Procedure Laterality Date    BACK SURGERY      CHOLECYSTECTOMY  2011    CORONARY ANGIOPLASTY      CORONARY ARTERY BYPASS GRAFT  2000    5 bypass surgeries    TONSILLECTOMY         Family History   Problem Relation Age of Onset    Heart disease Mother     Heart attack Mother     Heart disease Father     Heart attack Father     Hypertension Brother     Heart disease Paternal Uncle     Prostate cancer Neg Hx     Kidney disease Neg Hx        Social History   Substance Use Topics    Smoking status: Never Smoker    Smokeless tobacco: Never Used    Alcohol use No       ALLERGIES: Reviewed in EPIC    CURRENT MEDICATIONS: Reviewed in EPIC    REVIEW OF SYSTEMS:  Sleep related symptoms as per HPI;    Positive weight gain;    Denies sinus problems;    Positive dyspnea;    Denies palpitations;    Denies acid reflux;    Denies polyuria;    Denies headaches;    Denies mood disturbance;    Denies anemia;    Otherwise, a balance of systems reviewed is negative.    PHYSICAL EXAM:  /74   Pulse 84   Ht 6' (1.829 m)   Wt 92.9 kg (204 lb 12.9 oz)   BMI 27.78 kg/m²   GENERAL: Well groomed; Normally developed; Overweight  HEENT: Conjunctivae are non-erythematous; Pupils equal, round, and reactive to light;    Nose is symmetrical; Nasal mucosa is pink and moist; Septum is deviated to left -severe;    Turbinates are normal; Nasal airflow is normal;    Posterior pharynx is pink;  Posterior palate is low; Modified Mallampati: IV   Uvula is normal; Tongue is normal; Tonsils +1;    Dentition is fair; No TMJ tenderness;    Jaw opening and protrusion without click and without discomfort.  NECK: Supple. No thyromegaly. No palpable nodes. Neck circumference in inches is 16.5  SKIN: On face and neck: No abrasions, no rashes, no lesions.     No subcutaneous nodules are palpable.  RESPIRATORY: Chest is clear to auscultation.     Normal chest expansion and non-labored breathing at rest.  CARDIOVASCULAR: Normal S1, S2.  No murmurs, gallops or rubs.   No carotid bruits bilaterally.  EXTREMITIES: No clubbing. No cyanosis. Edema is absent.   NEURO: Oriented to time, place and person.    Normal attention span and concentration.  Station normal. Gait normal.  PSYCH: Affect is full. Mood is normal.    ASSESSMENT:    1. Obstructive Sleep Apnea, moderate by AHI, severe by RDI. The patient symptomatically has snoring, gasping, and excessive daytime sleepiness. Medical co-morbidities of hypertension and obesity. This warrants treatment for obstructive sleep apnea.    2. Nasal turbinate hypertrophy - aggravated by deviated septum    3. Deviated nasal septum - causing severe blockage on left - may limit future PAP acceptance         PLAN:    Treatment:  -Referral to ENT Dr. Rascon for surgical consideration for deviated septum  -Pt to keep in touch after ENT appt, ultimately will re-trial CPAP    Education: During our discussion today, we talked about the etiology of obstructive sleep apnea as well as the potential ramifications of untreated sleep apnea, which could include daytime sleepiness, hypertension, heart disease and/or stroke.  We discussed potential treatment options, which could include weight loss, body positioning, use of an oral appliance, continuous positive airway pressure (CPAP), EPAP, or referral for surgical consideration.    Precautions: The patient was advised to abstain from driving should they  feel sleepy or drowsy.

## 2018-06-19 ENCOUNTER — OFFICE VISIT (OUTPATIENT)
Dept: NEUROSURGERY | Facility: CLINIC | Age: 68
End: 2018-06-19
Payer: MEDICARE

## 2018-06-19 ENCOUNTER — TELEPHONE (OUTPATIENT)
Dept: OTOLARYNGOLOGY | Facility: CLINIC | Age: 68
End: 2018-06-19

## 2018-06-19 VITALS
HEART RATE: 78 BPM | DIASTOLIC BLOOD PRESSURE: 74 MMHG | SYSTOLIC BLOOD PRESSURE: 155 MMHG | BODY MASS INDEX: 28.17 KG/M2 | TEMPERATURE: 98 F | WEIGHT: 207.69 LBS

## 2018-06-19 DIAGNOSIS — M51.16 LUMBAR DISC DISEASE WITH RADICULOPATHY: Primary | ICD-10-CM

## 2018-06-19 DIAGNOSIS — M50.10 CERVICAL DISC DISORDER WITH RADICULOPATHY: ICD-10-CM

## 2018-06-19 PROCEDURE — 3077F SYST BP >= 140 MM HG: CPT | Mod: CPTII,S$GLB,, | Performed by: NEUROLOGICAL SURGERY

## 2018-06-19 PROCEDURE — 99999 PR PBB SHADOW E&M-EST. PATIENT-LVL III: CPT | Mod: PBBFAC,,, | Performed by: NEUROLOGICAL SURGERY

## 2018-06-19 PROCEDURE — 99214 OFFICE O/P EST MOD 30 MIN: CPT | Mod: S$GLB,,, | Performed by: NEUROLOGICAL SURGERY

## 2018-06-19 PROCEDURE — 3078F DIAST BP <80 MM HG: CPT | Mod: CPTII,S$GLB,, | Performed by: NEUROLOGICAL SURGERY

## 2018-06-19 RX ORDER — METOPROLOL TARTRATE 50 MG/1
TABLET ORAL
COMMUNITY
Start: 2018-06-04 | End: 2018-09-21 | Stop reason: SDUPTHER

## 2018-06-19 NOTE — PROGRESS NOTES
Subjective:    I, Lisa Grady, attest that this documentation has been prepared under the direction and in the presence of CHEIKH Love MD.     Patient ID: Sherif Knutson Jr. is a 67 y.o. male.    Chief Complaint: Consult    HPI   Pt is a 67 y.o. male who presents per referral by Dr. Mike Maxwell for evaluation of cervical disc disease and  C4-5 disc with left C5 nerve root impingement. Pt did not bring actual scans with them. Pt apparently underwent back surgery in the 1980's Pt states that he endures chronic neck pain radiating to his bilateral lower extremities, left worse than right. Pt has received injections and PT with very mild relief.     Review of Systems   Constitutional: Negative for chills, diaphoresis, fatigue and fever.   HENT: Negative for congestion, rhinorrhea, sinus pressure, sneezing, sore throat and trouble swallowing.    Eyes: Negative.  Negative for visual disturbance.   Respiratory: Negative for cough, choking, chest tightness and shortness of breath.    Cardiovascular: Negative for chest pain.   Gastrointestinal: Negative for abdominal pain, diarrhea, nausea and vomiting.   Endocrine: Negative.    Genitourinary: Negative for dysuria.   Musculoskeletal: Positive for neck pain and neck stiffness.   Skin: Negative for color change, pallor, rash and wound.   Neurological: Negative for syncope.   Hematological: Does not bruise/bleed easily.   Psychiatric/Behavioral: Negative for confusion.       Objective:      Physical Exam:  Nursing note and vitals reviewed.    Constitutional: He appears well-developed.     Eyes: Pupils are equal, round, and reactive to light. Conjunctivae and EOM are normal.     Cardiovascular: Normal rate, regular rhythm, normal pulses and intact distal pulses.     Abdominal: Soft.     Psych/Behavior: He is alert. He is oriented to person, place, and time. He has a normal mood and affect.     Musculoskeletal: Gait is normal.        Neck: Range of motion is full. There is no  tenderness. Muscle strength is 5/5. Tone is normal.        Back: Range of motion is full. There is no tenderness. Muscle strength is 5/5. Tone is normal.        Right Upper Extremities: Range of motion is full. There is no tenderness. Muscle strength is 5/5. Tone is normal.        Left Upper Extremities: Range of motion is full. There is no tenderness. Muscle strength is 5/5. Tone is normal.       Right Lower Extremities: Range of motion is full. There is no tenderness. Muscle strength is 5/5. Tone is normal.        Left Lower Extremities: Range of motion is full. There is no tenderness. Muscle strength is 5/5. Tone is normal.     Neurological:        Coordination: He has a normal Romberg Test, normal finger to nose coordination, normal heel to shin coordination and normal tandem walking coordination.        DTRs: DTRs are normal. Tricep reflexes are 2+ on the right side and 2+ on the left side. Bicep reflexes are 2+ on the right side and 2+ on the left side. Brachioradialis reflexes are 2+ on the right side and 2+ on the left side. Patellar reflexes are 2+ on the right side and 2+ on the left side. Achilles reflexes are 2+ on the right side and 2+ on the left side.        Cranial nerves: Cranial nerve(s) II, III, IV, V, VI, VII, VIII, IX, X, XI and XII are intact.       Pt is full strength.   No Adrian's no clonus.   Reasonable cervical ROM.   Back wound well healed.   Negative SLR.     Imaging:   No imaging available.     Assessment/Plan:   Pt with neck and arm pain. Multiple level cervical disc disease, most pronounced at C4-5 unfortunately I don't have the MRI scan with me. I have instructed him to get the MRI scan. In the mean time I think it would be reasonable to get right selective nerve root block and EMG. I would also like him to get injections.     I, CHEIKH Love MD, personally performed the services described in this documentation. All medical record entries made by the scribe, Lisa Grady, were at my  direction and in my presence.  I have reviewed the chart and agree that the record reflects my personal performance and is accurate and complete.

## 2018-06-19 NOTE — LETTER
June 24, 2018      Mike Maxwell MD  [address] [place] [address]           [facility] - Neurosurgery 7th Fl  [address]  [facility] [address]  Phone: [phone]          Patient: Sherif Knutson Jr.   MR Number: [id number]   YOB: 1950   Date of Visit: 6/19/2018       Dear Dr. Mike Maxwell:    Thank you for referring Sherif Knutson to me for evaluation. Attached you will find relevant portions of my assessment and plan of care.    If you have questions, please do not hesitate to call me. I look forward to following Sherif Knutson along with you.    Sincerely,    Arnaud Love MD    Enclosure  CC:  No Recipients    If you would like to receive this communication electronically, please contact [email or web address] or [phone] to request more information on [email or web address] Link access.    For providers and/or their staff who would like to refer a patient to [facility], please contact us through our one-stop-shop provider referral line, [facility], at [phone].    If you feel you have received this communication in error or would no longer like to receive these types of communications, please e-mail [email or web address]

## 2018-06-25 ENCOUNTER — TELEPHONE (OUTPATIENT)
Dept: NEUROLOGY | Facility: CLINIC | Age: 68
End: 2018-06-25

## 2018-06-25 NOTE — TELEPHONE ENCOUNTER
----- Message from Laura Ac RN sent at 6/25/2018 11:07 AM CDT -----  Per Dr Love can we get this patient scheduled for EMG/NCS 4 extremities please

## 2018-07-05 ENCOUNTER — HOSPITAL ENCOUNTER (OUTPATIENT)
Dept: INTERVENTIONAL RADIOLOGY/VASCULAR | Facility: HOSPITAL | Age: 68
Discharge: HOME | End: 2018-07-05
Attending: NEUROLOGICAL SURGERY
Payer: MEDICARE

## 2018-07-05 VITALS
SYSTOLIC BLOOD PRESSURE: 155 MMHG | HEART RATE: 74 BPM | OXYGEN SATURATION: 97 % | RESPIRATION RATE: 18 BRPM | DIASTOLIC BLOOD PRESSURE: 85 MMHG

## 2018-07-05 DIAGNOSIS — M50.10 CERVICAL DISC DISORDER WITH RADICULOPATHY: ICD-10-CM

## 2018-07-05 DIAGNOSIS — M51.16 LUMBAR DISC DISEASE WITH RADICULOPATHY: ICD-10-CM

## 2018-07-05 PROCEDURE — 64479 NJX AA&/STRD TFRM EPI C/T 1: CPT | Mod: LT,,, | Performed by: RADIOLOGY

## 2018-07-05 PROCEDURE — 27200940 IR EPIDURAL TRANSFORAMINAL INJ 1ST VERT CERV THOR BILAT

## 2018-07-05 PROCEDURE — 63600175 PHARM REV CODE 636 W HCPCS: Performed by: RADIOLOGY

## 2018-07-05 PROCEDURE — 25500020 PHARM REV CODE 255: Performed by: NEUROLOGICAL SURGERY

## 2018-07-05 RX ORDER — MIDAZOLAM HYDROCHLORIDE 1 MG/ML
2 INJECTION, SOLUTION INTRAMUSCULAR; INTRAVENOUS ONCE
Status: COMPLETED | OUTPATIENT
Start: 2018-07-05 | End: 2018-07-05

## 2018-07-05 RX ORDER — DEXAMETHASONE SODIUM PHOSPHATE 100 MG/10ML
10 INJECTION INTRAMUSCULAR; INTRAVENOUS ONCE
Status: COMPLETED | OUTPATIENT
Start: 2018-07-05 | End: 2018-07-05

## 2018-07-05 RX ADMIN — MIDAZOLAM HYDROCHLORIDE 2 MG: 1 INJECTION, SOLUTION INTRAMUSCULAR; INTRAVENOUS at 09:07

## 2018-07-05 RX ADMIN — DEXAMETHASONE SODIUM PHOSPHATE 10 MG: 10 INJECTION INTRAMUSCULAR; INTRAVENOUS at 10:07

## 2018-07-05 RX ADMIN — IOHEXOL 1 ML: 180 INJECTION INTRAVENOUS at 10:07

## 2018-07-05 NOTE — DISCHARGE INSTRUCTIONS
Interventional Radiology [phone]  Mon-Fri  8A-4P  24hr Radiology Resident on call [phone]

## 2018-07-05 NOTE — PROCEDURES
Radiology Post-Procedure Note    Pre Op Diagnosis: neck and left arm pain    Post Op Diagnosis: same    Procedure: Cervical ZANDRA    Procedure performed by: Stone Sosa MD; Abebe Childress (resident)      Written Informed Consent Obtained: Yes    Specimen Removed: NO    Estimated Blood Loss: Minimal    Findings: Successful left C4-5 ZANDRA with contrast injection confirming needle placement in epidural space     Level injected: left C4-5  Needle used: 22 gauge  Dose:  10 mg Dexamethasone   1 mL Lidocaine 1% MPF    Patient tolerated procedure well.    Abebe Childress  Radiology PGY-5

## 2018-07-05 NOTE — PROGRESS NOTES
Pt arrived to IR room 190, no acute distress noted. Orders and labs reviewed on chart. Awaiting consent.

## 2018-07-05 NOTE — H&P
"Radiology History & Physical      SUBJECTIVE:     Chief Complaint: neck and arm pain    History of Present Illness:  Sherif Knutson Jr. is a 67 y.o. male with chronic neck and radicular arm pain, left greater than right who presents for left C4-5 TF ZANDRA.  Past Medical History:   Diagnosis Date    CHF (congestive heart failure) 8/29/2017    Coronary artery disease 2006, 5/2013    s/p pci to rca (2006), LCx (5/2013)    DM (diabetes mellitus)     HLD (hyperlipidemia)     HTN (hypertension)     Kidney stone     EMELI (obstructive sleep apnea) 11/2/2015    Subclavian artery stenosis, left      Past Surgical History:   Procedure Laterality Date    BACK SURGERY      CHOLECYSTECTOMY  2011    CORONARY ANGIOPLASTY      CORONARY ARTERY BYPASS GRAFT  2000    5 bypass surgeries    SPINE SURGERY  1980    TONSILLECTOMY         Home Meds:   Prior to Admission medications    Medication Sig Start Date End Date Taking? Authorizing Provider   aspirin (ECOTRIN) 81 MG EC tablet Take 325 mg by mouth once daily.     Historical Provider, MD   BD ULTRA-FINE JERMAINE PEN NEEDLES 32 gauge x 5/32" Ndle  9/17/16   Historical Provider, MD   clopidogrel (PLAVIX) 75 mg tablet Take 600mg po X1 today and then 75mg po qday 7/18/17   Juancho Black MD   CYANOCOBALAMIN, VITAMIN B-12, (VITAMIN B-12 ORAL) Take by mouth.    Historical Provider, MD   EASY TOUCH 31 X 5/16 " Ndle  8/14/13   Historical Provider, MD   furosemide (LASIX) 20 MG tablet Take 1 tablet (20 mg total) by mouth once daily.  Patient taking differently: Take 20 mg by mouth as needed.  8/24/17 8/24/18  Juancho lBack MD   isosorbide mononitrate (IMDUR) 30 MG 24 hr tablet Take 1 tablet (30 mg total) by mouth once daily. 4/3/18 4/3/19  Silas Diego MD   liraglutide 0.6 mg/0.1 mL, 18 mg/3 mL, subq PNIJ (VICTOZA 3-ANDRE) 0.6 mg/0.1 mL (18 mg/3 mL) PnIj Inject 1.8 mg into the skin once daily.    Historical Provider, MD   LYRICA 100 mg capsule 2 (two) times daily.  " "2/19/16   Historical Provider, MD   metoprolol tartrate (LOPRESSOR) 50 MG tablet  6/4/18   Historical Provider, MD   midodrine (PROAMATINE) 2.5 MG Tab Take 1 tablet (2.5 mg total) by mouth 3 (three) times daily. 10/25/17 10/25/18  Augustin Ervin MD   nitroGLYCERIN (NITROSTAT) 0.4 MG SL tablet Place 1 tablet (0.4 mg total) under the tongue every 5 (five) minutes as needed for Chest pain. 7/31/17 7/31/18  Juliana Patiño MD   NOVOLOG 100 unit/mL injection  2/24/17   Historical Provider, MD   NOVOLOG FLEXPEN 100 unit/mL InPn pen as needed.  3/7/16   Historical Provider, MD   oxycodone-acetaminophen (PERCOCET)  mg per tablet Take 1 tablet by mouth every 6 (six) hours as needed for Pain. 1/20/17   Jose F Mclaughlin MD   ranitidine (ZANTAC) 300 MG tablet 300 mg 2 (two) times daily.  2/12/18   Historical Provider, MD   rosuvastatin (CRESTOR) 40 MG Tab Take 1 tablet (40 mg total) by mouth every evening. 2/28/14   Fernando Guevara MD   sub-q insulin device, 20 unit (VGO 20) [place]ashay by Misc.(Non-Drug; Combo Route) route.    Historical Provider, MD   sub-q insulin device, 20 unit Lashay by Misc.(Non-Drug; Combo Route) route.    Historical Provider, MD   SURE COMFORT INSULIN SYRINGE 1/2 mL 31 x 5/16" Syrg  6/14/13   Historical Provider, MD     Anticoagulants/Antiplatelets: no anticoagulation, ASA and plavix held    Allergies:   Review of patient's allergies indicates:   Allergen Reactions    Sulfa (sulfonamide antibiotics) Photosensitivity    Zocor [simvastatin] Other (See Comments)     lathargic     Sedation History:  no adverse reactions    Review of Systems:   Hematological: no known coagulopathies  Respiratory: no shortness of breath  Cardiovascular: no chest pain  Gastrointestinal: no abdominal pain  Genito-Urinary: no dysuria  Musculoskeletal: chronic neck and back pain  Neurological: no TIA or stroke symptoms         OBJECTIVE:     Vital Signs (Most Recent)  Pulse: 74 (07/05/18 0941)  Resp: 18 " (07/05/18 0941)  BP: (!) 155/85 (07/05/18 0941)  SpO2: 97 % (07/05/18 0941)    Physical Exam:  ASA: 2  Mallampati: na    General: no acute distress  Mental Status: alert and oriented to person, place and time  HEENT: normocephalic, atraumatic  Chest: unlabored breathing  Heart: regular heart rate  Abdomen: nondistended  Extremity: moves all extremities    Laboratory  Lab Results   Component Value Date    INR 1.0 07/31/2017       Lab Results   Component Value Date    WBC 6.01 04/03/2018    HGB 14.5 04/03/2018    HCT 43.6 04/03/2018    MCV 91 04/03/2018     04/03/2018      Lab Results   Component Value Date     (H) 04/03/2018     (L) 04/03/2018    K 3.9 04/03/2018     04/03/2018    CO2 27 04/03/2018    BUN 19 04/03/2018    CREATININE 1.4 04/03/2018    CALCIUM 9.1 04/03/2018    MG 2.2 07/31/2017    ALT 26 07/31/2017    AST 26 07/31/2017    ALBUMIN 3.3 (L) 07/31/2017    BILITOT 1.0 07/31/2017    BILIDIR 0.4 (H) 11/06/2013       ASSESSMENT/PLAN:     Sedation Plan: local  Patient will undergo left C4-5 TF ZANDRA.    [facility]  Radiology PGY-5

## 2018-07-18 DIAGNOSIS — I20.9 ANGINA, CLASS III: ICD-10-CM

## 2018-07-18 RX ORDER — CLOPIDOGREL BISULFATE 75 MG/1
75 TABLET ORAL DAILY
Qty: 30 TABLET | Refills: 11 | Status: SHIPPED | OUTPATIENT
Start: 2018-07-18 | End: 2019-07-26 | Stop reason: SDUPTHER

## 2018-08-02 ENCOUNTER — OFFICE VISIT (OUTPATIENT)
Dept: OTOLARYNGOLOGY | Facility: CLINIC | Age: 68
End: 2018-08-02
Payer: MEDICARE

## 2018-08-02 VITALS
HEIGHT: 72 IN | WEIGHT: 207 LBS | BODY MASS INDEX: 28.04 KG/M2 | SYSTOLIC BLOOD PRESSURE: 113 MMHG | HEART RATE: 85 BPM | DIASTOLIC BLOOD PRESSURE: 74 MMHG

## 2018-08-02 DIAGNOSIS — Z78.9 INTOLERANCE OF CONTINUOUS POSITIVE AIRWAY PRESSURE (CPAP) VENTILATION: ICD-10-CM

## 2018-08-02 DIAGNOSIS — G47.33 OSA (OBSTRUCTIVE SLEEP APNEA): ICD-10-CM

## 2018-08-02 DIAGNOSIS — J30.9 ALLERGIC RHINITIS, UNSPECIFIED SEASONALITY, UNSPECIFIED TRIGGER: ICD-10-CM

## 2018-08-02 DIAGNOSIS — J34.2 NASAL SEPTAL DEVIATION: ICD-10-CM

## 2018-08-02 DIAGNOSIS — J34.3 HYPERTROPHY OF BOTH INFERIOR NASAL TURBINATES: ICD-10-CM

## 2018-08-02 DIAGNOSIS — T48.5X5A RHINITIS MEDICAMENTOSA: Primary | ICD-10-CM

## 2018-08-02 DIAGNOSIS — J31.0 RHINITIS MEDICAMENTOSA: Primary | ICD-10-CM

## 2018-08-02 PROCEDURE — 3078F DIAST BP <80 MM HG: CPT | Mod: CPTII,S$GLB,, | Performed by: SPECIALIST

## 2018-08-02 PROCEDURE — 3074F SYST BP LT 130 MM HG: CPT | Mod: CPTII,S$GLB,, | Performed by: SPECIALIST

## 2018-08-02 PROCEDURE — 99204 OFFICE O/P NEW MOD 45 MIN: CPT | Mod: 25,S$GLB,, | Performed by: SPECIALIST

## 2018-08-02 PROCEDURE — 96372 THER/PROPH/DIAG INJ SC/IM: CPT | Mod: 59,S$GLB,, | Performed by: SPECIALIST

## 2018-08-02 RX ORDER — FLUTICASONE PROPIONATE 50 MCG
SPRAY, SUSPENSION (ML) NASAL
Qty: 1 BOTTLE | Refills: 11 | Status: ON HOLD | OUTPATIENT
Start: 2018-08-02 | End: 2023-11-08 | Stop reason: HOSPADM

## 2018-08-02 RX ORDER — CYANOCOBALAMIN 1000 UG/ML
1000 INJECTION, SOLUTION INTRAMUSCULAR; SUBCUTANEOUS
Status: COMPLETED | OUTPATIENT
Start: 2018-08-02 | End: 2018-08-02

## 2018-08-02 RX ORDER — INSULIN DEGLUDEC 200 U/ML
INJECTION, SOLUTION SUBCUTANEOUS
COMMUNITY
Start: 2018-07-02 | End: 2020-06-30

## 2018-08-02 RX ORDER — AZELASTINE 1 MG/ML
SPRAY, METERED NASAL
Qty: 30 ML | Refills: 11 | Status: SHIPPED | OUTPATIENT
Start: 2018-08-02

## 2018-08-02 RX ORDER — TRIAMCINOLONE ACETONIDE 40 MG/ML
40 INJECTION, SUSPENSION INTRA-ARTICULAR; INTRAMUSCULAR
Status: COMPLETED | OUTPATIENT
Start: 2018-08-02 | End: 2018-08-02

## 2018-08-02 RX ADMIN — TRIAMCINOLONE ACETONIDE 40 MG: 40 INJECTION, SUSPENSION INTRA-ARTICULAR; INTRAMUSCULAR at 10:08

## 2018-08-02 RX ADMIN — CYANOCOBALAMIN 1000 MCG: 1000 INJECTION, SOLUTION INTRAMUSCULAR; SUBCUTANEOUS at 10:08

## 2018-08-02 NOTE — LETTER
August 5, 2018      Juan Bowman, SANDEEP  [address]  [address]  [facility] [address]           [facility] - Otorhinol[facility]ology  [address]  [facility] [address]  Phone: [phone]  Fax: [phone]          Patient: Sherif Knutson Jr.   MR Number: [id number]   YOB: 1950   Date of Visit: 8/2/2018       Dear Juan Bowman:    Thank you for referring Sherif Knutson to me for evaluation. Attached you will find relevant portions of my assessment and plan of care.    If you have questions, please do not hesitate to call me. I look forward to following Sherif Knutson along with you.    Sincerely,    JUAN DAVID Rascon MD    Enclosure  CC:  No Recipients    If you would like to receive this communication electronically, please contact [email or web address] or [phone] to request more information on [employer] Link access.    For providers and/or their staff who would like to refer a patient to [facility], please contact us through our one-stop-shop provider referral line, [facility], at [phone].    If you feel you have received this communication in error or would no longer like to receive these types of communications, please e-mail [email or web address]

## 2018-08-02 NOTE — PROGRESS NOTES
Patient given Kenalog 40mg IM in the L ventrogluteal. Patient tolerated well and Band-Aid was applied. Lot#[id number]. Patient advised to wait in the lobby for 15 min to make sure no adverse reactions occur. Patient states verbal understanding and has no further questions.    Patient given cyanocobalamin IM in the R ventrogluteal. Patient tolerated well and Band-Aid was applied. Lot#[id number]. Patient advised to wait in the lobby for 15 min to make sure no adverse reactions occur. Patient states verbal understanding and has no further questions.

## 2018-08-05 NOTE — PROGRESS NOTES
Subjective:       Patient ID: Sherif Knutson Jr. is a 67 y.o. male.    Chief Complaint: Sinus Problem and Headache    The patient is having chronic nasal obstruction, particularly on the left side.  Nasal secretions are clear.  Was recently diagnosed with sleep apnea and started on CPAP.  He is unable to use CPAP because of nasal obstruction.  He has been using a decongestant nasal spray on a daily basis for the over 2 years.  He uses a 15-20 times per day.      Review of Systems   Constitutional: Positive for fatigue. Negative for activity change, appetite change, chills, fever and unexpected weight change.   HENT: Positive for congestion, postnasal drip, rhinorrhea, sinus pain, sinus pressure and sore throat. Negative for ear discharge, ear pain, facial swelling, hearing loss, mouth sores, sneezing, tinnitus, trouble swallowing and voice change.    Eyes: Negative for photophobia, pain, discharge, redness, itching and visual disturbance.   Respiratory: Positive for cough. Negative for apnea, choking, shortness of breath and wheezing.    Cardiovascular: Negative for chest pain and palpitations.   Gastrointestinal: Negative for abdominal distention, abdominal pain, nausea and vomiting.   Musculoskeletal: Negative for arthralgias, myalgias, neck pain and neck stiffness.   Skin: Negative.  Negative for color change, pallor and rash.   Allergic/Immunologic: Negative for environmental allergies, food allergies and immunocompromised state.   Neurological: Positive for headaches. Negative for dizziness, facial asymmetry, speech difficulty, weakness, light-headedness and numbness.   Hematological: Negative for adenopathy. Does not bruise/bleed easily.   Psychiatric/Behavioral: Negative for agitation, confusion, decreased concentration and sleep disturbance.       Objective:      Physical Exam   Constitutional: He is oriented to person, place, and time. He appears well-developed and well-nourished. He is cooperative.   HENT:    Head: Normocephalic.   Right Ear: External ear and ear canal normal. Tympanic membrane is retracted.   Left Ear: External ear and ear canal normal. Tympanic membrane is retracted.   Nose: Mucosal edema (cyanotic, boggy inferior turbinates bilaterally), rhinorrhea (clear mucus bilaterally) and septal deviation present.   Mouth/Throat: Uvula is midline, oropharynx is clear and moist and mucous membranes are normal. No oral lesions.   Eyes: EOM and lids are normal. Pupils are equal, round, and reactive to light. Right eye exhibits no discharge and no exudate. Left eye exhibits no discharge and no exudate. Right conjunctiva is injected. Left conjunctiva is injected.   Neck: Trachea normal and normal range of motion. No muscular tenderness present. No tracheal deviation present. No thyroid mass and no thyromegaly present.   Cardiovascular: Normal rate, regular rhythm, normal heart sounds and normal pulses.    Pulmonary/Chest: Effort normal and breath sounds normal. No stridor. He has no decreased breath sounds. He has no wheezes. He has no rhonchi. He has no rales.   Abdominal: Soft. Bowel sounds are normal. There is no tenderness.   Musculoskeletal: Normal range of motion.   Lymphadenopathy:        Head (right side): No submental, no submandibular, no preauricular, no posterior auricular and no occipital adenopathy present.        Head (left side): No submental, no submandibular, no preauricular, no posterior auricular and no occipital adenopathy present.     He has no cervical adenopathy.   Neurological: He is alert and oriented to person, place, and time. He has normal strength. No cranial nerve deficit or sensory deficit. Gait normal.   Skin: Skin is warm and dry. No petechiae and no rash noted. No cyanosis. Nails show no clubbing.   Psychiatric: He has a normal mood and affect. His speech is normal and behavior is normal. Judgment and thought content normal. Cognition and memory are normal.       Assessment:        1. Rhinitis medicamentosa    2. Allergic rhinitis, unspecified seasonality, unspecified trigger    3. Nasal septal deviation    4. Hypertrophy of both inferior nasal turbinates    5. EMELI (obstructive sleep apnea)    6. Intolerance of continuous positive airway pressure (CPAP) ventilation        Plan:       I am placing the patient on a twice daily regimen of Astelin and Flonase.  He can use his decongestant nasal spray at nighttime only on 1 side only.  He is still alternate sides every other night.  I will recheck him in 2 weeks.  I have advised that he stops is nasal spray completely if possible.

## 2018-08-20 ENCOUNTER — OFFICE VISIT (OUTPATIENT)
Dept: OTOLARYNGOLOGY | Facility: CLINIC | Age: 68
End: 2018-08-20
Payer: MEDICARE

## 2018-08-20 VITALS
WEIGHT: 207 LBS | HEIGHT: 72 IN | SYSTOLIC BLOOD PRESSURE: 104 MMHG | TEMPERATURE: 98 F | BODY MASS INDEX: 28.04 KG/M2 | DIASTOLIC BLOOD PRESSURE: 60 MMHG | HEART RATE: 78 BPM

## 2018-08-20 DIAGNOSIS — J34.2 NASAL SEPTAL DEVIATION: ICD-10-CM

## 2018-08-20 DIAGNOSIS — J31.0 RHINITIS MEDICAMENTOSA: Primary | ICD-10-CM

## 2018-08-20 DIAGNOSIS — Z78.9 INTOLERANCE OF CONTINUOUS POSITIVE AIRWAY PRESSURE (CPAP) VENTILATION: ICD-10-CM

## 2018-08-20 DIAGNOSIS — G47.33 OSA (OBSTRUCTIVE SLEEP APNEA): ICD-10-CM

## 2018-08-20 DIAGNOSIS — J34.3 HYPERTROPHY OF BOTH INFERIOR NASAL TURBINATES: ICD-10-CM

## 2018-08-20 DIAGNOSIS — T48.5X5A RHINITIS MEDICAMENTOSA: Primary | ICD-10-CM

## 2018-08-20 PROCEDURE — 99213 OFFICE O/P EST LOW 20 MIN: CPT | Mod: S$GLB,,, | Performed by: SPECIALIST

## 2018-08-20 PROCEDURE — 3074F SYST BP LT 130 MM HG: CPT | Mod: CPTII,S$GLB,, | Performed by: SPECIALIST

## 2018-08-20 PROCEDURE — 3078F DIAST BP <80 MM HG: CPT | Mod: CPTII,S$GLB,, | Performed by: SPECIALIST

## 2018-08-20 RX ORDER — MONTELUKAST SODIUM 10 MG/1
10 TABLET ORAL NIGHTLY
Qty: 30 TABLET | Refills: 11 | Status: SHIPPED | OUTPATIENT
Start: 2018-08-20 | End: 2018-09-19

## 2018-08-21 NOTE — PROGRESS NOTES
Subjective:       Patient ID: Sherif Knutson Jr. is a 67 y.o. male.    Chief Complaint: Follow-up    The patient is for a follow-up visit.  He has been using the Astelin and Flonase on a twice daily basis.  He has not used any decongestant nasal spray in over a week.  He is breathing much better through his nose.  At night when he lays supine the nose does congestion more, but not to the degree that he needs to use a decongestant nasal spray.      Review of Systems   Constitutional: Positive for fatigue. Negative for activity change, appetite change, chills, fever and unexpected weight change.   HENT: Positive for congestion, postnasal drip, rhinorrhea, sinus pressure, sinus pain and sore throat. Negative for ear discharge, ear pain, facial swelling, hearing loss, mouth sores, sneezing, tinnitus, trouble swallowing and voice change.    Eyes: Negative for photophobia, pain, discharge, redness, itching and visual disturbance.   Respiratory: Positive for cough. Negative for apnea, choking, shortness of breath and wheezing.    Cardiovascular: Negative for chest pain and palpitations.   Gastrointestinal: Negative for abdominal distention, abdominal pain, nausea and vomiting.   Musculoskeletal: Negative for arthralgias, myalgias, neck pain and neck stiffness.   Skin: Negative.  Negative for color change, pallor and rash.   Allergic/Immunologic: Negative for environmental allergies, food allergies and immunocompromised state.   Neurological: Positive for headaches. Negative for dizziness, facial asymmetry, speech difficulty, weakness, light-headedness and numbness.   Hematological: Negative for adenopathy. Does not bruise/bleed easily.   Psychiatric/Behavioral: Negative for agitation, confusion, decreased concentration and sleep disturbance.       Objective:      Physical Exam   Constitutional: He is oriented to person, place, and time. He appears well-developed and well-nourished. He is cooperative.   HENT:   Head:  Normocephalic.   Right Ear: External ear and ear canal normal. Tympanic membrane is retracted.   Left Ear: External ear and ear canal normal. Tympanic membrane is retracted.   Nose: Mucosal edema (cyanotic, boggy inferior turbinates bilaterally), rhinorrhea (clear mucus bilaterally) and septal deviation (To the left) present.   Mouth/Throat: Uvula is midline, oropharynx is clear and moist and mucous membranes are normal. No oral lesions.   Eyes: EOM and lids are normal. Pupils are equal, round, and reactive to light. Right eye exhibits no discharge and no exudate. Left eye exhibits no discharge and no exudate. Right conjunctiva is injected. Left conjunctiva is injected.   Neck: Trachea normal and normal range of motion. No muscular tenderness present. No tracheal deviation present. No thyroid mass and no thyromegaly present.   Cardiovascular: Normal rate, regular rhythm, normal heart sounds and normal pulses.   Pulmonary/Chest: Effort normal and breath sounds normal. No stridor. He has no decreased breath sounds. He has no wheezes. He has no rhonchi. He has no rales.   Abdominal: Soft. Bowel sounds are normal. There is no tenderness.   Musculoskeletal: Normal range of motion.   Lymphadenopathy:        Head (right side): No submental, no submandibular, no preauricular, no posterior auricular and no occipital adenopathy present.        Head (left side): No submental, no submandibular, no preauricular, no posterior auricular and no occipital adenopathy present.     He has no cervical adenopathy.   Neurological: He is alert and oriented to person, place, and time. He has normal strength. No cranial nerve deficit or sensory deficit. Gait normal.   Skin: Skin is warm and dry. No petechiae and no rash noted. No cyanosis. Nails show no clubbing.   Psychiatric: He has a normal mood and affect. His speech is normal and behavior is normal. Judgment and thought content normal. Cognition and memory are normal.       Assessment:        1. Rhinitis medicamentosa    2. Nasal septal deviation    3. Hypertrophy of both inferior nasal turbinates    4. EMELI (obstructive sleep apnea)    5. Intolerance of continuous positive airway pressure (CPAP) ventilation        Plan:        I am placing the patient on a nighttime dose of Singulair.  He will continue with his to nasal sprays.  I will recheck him in 3 weeks.

## 2018-08-23 ENCOUNTER — PROCEDURE VISIT (OUTPATIENT)
Dept: NEUROLOGY | Facility: CLINIC | Age: 68
End: 2018-08-23
Payer: MEDICARE

## 2018-08-23 DIAGNOSIS — M51.16 LUMBAR DISC DISEASE WITH RADICULOPATHY: ICD-10-CM

## 2018-08-23 PROCEDURE — 95885 MUSC TST DONE W/NERV TST LIM: CPT | Mod: 59,S$GLB,, | Performed by: PSYCHIATRY & NEUROLOGY

## 2018-08-23 PROCEDURE — 95886 MUSC TEST DONE W/N TEST COMP: CPT | Mod: S$GLB,,, | Performed by: PSYCHIATRY & NEUROLOGY

## 2018-08-23 PROCEDURE — 95913 NRV CNDJ TEST 13/> STUDIES: CPT | Mod: S$GLB,,, | Performed by: PSYCHIATRY & NEUROLOGY

## 2018-08-28 ENCOUNTER — OFFICE VISIT (OUTPATIENT)
Dept: NEUROSURGERY | Facility: CLINIC | Age: 68
End: 2018-08-28
Payer: MEDICARE

## 2018-08-28 VITALS
HEIGHT: 72 IN | WEIGHT: 215.19 LBS | TEMPERATURE: 98 F | SYSTOLIC BLOOD PRESSURE: 145 MMHG | BODY MASS INDEX: 29.15 KG/M2 | DIASTOLIC BLOOD PRESSURE: 80 MMHG | HEART RATE: 76 BPM

## 2018-08-28 DIAGNOSIS — M50.10 CERVICAL DISC DISORDER WITH RADICULOPATHY: Primary | ICD-10-CM

## 2018-08-28 DIAGNOSIS — R93.7 ABNORMAL X-RAY OF CERVICAL SPINE: ICD-10-CM

## 2018-08-28 PROCEDURE — 99999 PR PBB SHADOW E&M-EST. PATIENT-LVL III: CPT | Mod: PBBFAC,,, | Performed by: NEUROLOGICAL SURGERY

## 2018-08-28 PROCEDURE — 3077F SYST BP >= 140 MM HG: CPT | Mod: CPTII,S$GLB,, | Performed by: NEUROLOGICAL SURGERY

## 2018-08-28 PROCEDURE — 3079F DIAST BP 80-89 MM HG: CPT | Mod: CPTII,S$GLB,, | Performed by: NEUROLOGICAL SURGERY

## 2018-08-28 PROCEDURE — 99214 OFFICE O/P EST MOD 30 MIN: CPT | Mod: S$GLB,,, | Performed by: NEUROLOGICAL SURGERY

## 2018-08-28 NOTE — PROGRESS NOTES
Subjective:    I, Lisa Grady, attest that this documentation has been prepared under the direction and in the presence of CHEIKH Love MD.     Patient ID: Sherif Knutson Jr. is a 67 y.o. male.    Chief Complaint: Follow-up    HPI   Pt is a 67 y.o. male who presents for follow up after EMG. Last time we saw him there was neck and arm pain, but did not have MRI scan at the time. We wanted to get hold of past MRI scan, EMG and selective nerve root block. Pt states that he did receive injections with relief for about 3 days and about 1 week of PT. Pt continues to endure significant, intermittent arm pain radiating to shoulders and notes mild LUE weakness and numbness. Pt denies pain at cyst site, denies any drainage, but does note past history of cyst drainage years ago.   Hold     Review of Systems   Constitutional: Negative for chills, diaphoresis, fatigue and fever.   HENT: Negative for congestion, rhinorrhea, sinus pressure, sneezing, sore throat and trouble swallowing.    Eyes: Negative.  Negative for visual disturbance.   Respiratory: Negative for cough, choking, chest tightness and shortness of breath.    Cardiovascular: Negative for chest pain.   Gastrointestinal: Negative for abdominal pain, diarrhea, nausea and vomiting.   Endocrine: Negative.    Genitourinary: Negative for dysuria.   Skin: Negative for color change, pallor, rash and wound.   Neurological: Positive for numbness. Negative for syncope.   Hematological: Does not bruise/bleed easily.   Psychiatric/Behavioral: Negative for confusion.       Objective:      Physical Exam:  Nursing note and vitals reviewed.    Constitutional: He appears well-developed.     Eyes: Pupils are equal, round, and reactive to light. Conjunctivae and EOM are normal.     Cardiovascular: Normal rate, regular rhythm, normal pulses and intact distal pulses.     Abdominal: Soft.     Psych/Behavior: He is alert. He is oriented to person, place, and time. He has a normal mood and affect.      Musculoskeletal: Gait is normal.        Neck: Range of motion is full. There is no tenderness. Muscle strength is 5/5. Tone is normal.        Back: Range of motion is full. There is no tenderness. Muscle strength is 5/5. Tone is normal.        Right Upper Extremities: Range of motion is full. There is no tenderness. Muscle strength is 5/5. Tone is normal.        Left Upper Extremities: Range of motion is full. There is no tenderness. Muscle strength is 5/5. Tone is normal.       Right Lower Extremities: Range of motion is full. There is no tenderness. Muscle strength is 5/5. Tone is normal.        Left Lower Extremities: Range of motion is full. There is no tenderness. Muscle strength is 5/5. Tone is normal.     Neurological:        Coordination: He has a normal Romberg Test, normal finger to nose coordination, normal heel to shin coordination and normal tandem walking coordination.        DTRs: DTRs are normal. Tricep reflexes are 2+ on the right side and 2+ on the left side. Bicep reflexes are 2+ on the right side and 2+ on the left side. Brachioradialis reflexes are 2+ on the right side and 2+ on the left side. Patellar reflexes are 2+ on the right side and 2+ on the left side. Achilles reflexes are 2+ on the right side and 2+ on the left side.        Cranial nerves: Cranial nerve(s) II, III, IV, V, VI, VII, VIII, IX, X, XI and XII are intact.       Pt continues to have neck pain and limited ROM. Pain seems to fit C6 distribution. No significant weakness.     Imaging:   MRI C spine, dated 7/20/2016, shows multiple disc bulge, but no definitive disc herniation. No significant central stenosis. Does have an incidental cyst lipoma or something alike on the back of the neck.     MRI L spine, dated 7/20/2018, advanced disc disease L1-2.     EMG shows radiculopathy and chronic L5-S1 radiculopathy.     CHEIKH GOLD MD, personally reviewed the imaging and interpreted independent of the radiology  report.    Assessment/Plan:   Pt with persistent and intractable neck pain and radiculopathy. Has not had any significant improvement with last round of injections or PT. His latest imaging is over 2 years old, I think we do need CT scans and MRI scans. I suspect a worsening herniated disc. Have him see one of our PA-C.     I, CHEIKH Love MD, personally performed the services described in this documentation. All medical record entries made by the scribe, Lisa Grady, were at my direction and in my presence.  I have reviewed the chart and agree that the record reflects my personal performance and is accurate and complete.

## 2018-08-28 NOTE — LETTER
August 29, 2018      Jose F Menon MD  [address] [facility] [address]           [facility] - Neurosurgery 7th Fl  [address]  [facility] [address]  Phone: [phone]          Patient: Sherif Knutson Jr.   MR Number: [id number]   YOB: 1950   Date of Visit: 8/28/2018       Dear Dr. Jose F Menon:    Thank you for referring Sherif Knutson to me for evaluation. Attached you will find relevant portions of my assessment and plan of care.    If you have questions, please do not hesitate to call me. I look forward to following Sherif Knutson along with you.    Sincerely,    Arnaud Love MD    Enclosure  CC:  No Recipients    If you would like to receive this communication electronically, please contact [email or web address] or [phone] to request more information on [address] Link access.    For providers and/or their staff who would like to refer a patient to [facility], please contact us through our one-stop-shop provider referral line, [facility] , at [phone].    If you feel you have received this communication in error or would no longer like to receive these types of communications, please e-mail [email or web address]

## 2018-09-04 RX ORDER — FUROSEMIDE 20 MG/1
20 TABLET ORAL DAILY
Qty: 30 TABLET | Refills: 6 | OUTPATIENT
Start: 2018-09-04 | End: 2019-09-04

## 2018-09-19 RX ORDER — METOPROLOL TARTRATE 50 MG/1
TABLET ORAL
Qty: 60 TABLET | OUTPATIENT
Start: 2018-09-19

## 2018-09-21 RX ORDER — METOPROLOL TARTRATE 50 MG/1
TABLET ORAL
Qty: 60 TABLET | Refills: 6 | Status: SHIPPED | OUTPATIENT
Start: 2018-09-21 | End: 2019-09-26 | Stop reason: SDUPTHER

## 2018-10-02 ENCOUNTER — HOSPITAL ENCOUNTER (OUTPATIENT)
Dept: RADIOLOGY | Facility: HOSPITAL | Age: 68
Discharge: HOME | End: 2018-10-02
Attending: NEUROLOGICAL SURGERY
Payer: MEDICARE

## 2018-10-02 ENCOUNTER — OFFICE VISIT (OUTPATIENT)
Dept: NEUROSURGERY | Facility: CLINIC | Age: 68
End: 2018-10-02
Payer: MEDICARE

## 2018-10-02 VITALS
BODY MASS INDEX: 29.12 KG/M2 | SYSTOLIC BLOOD PRESSURE: 94 MMHG | HEIGHT: 72 IN | WEIGHT: 215 LBS | HEART RATE: 72 BPM | DIASTOLIC BLOOD PRESSURE: 63 MMHG | TEMPERATURE: 98 F

## 2018-10-02 DIAGNOSIS — R93.7 ABNORMAL X-RAY OF CERVICAL SPINE: ICD-10-CM

## 2018-10-02 DIAGNOSIS — M47.22 CERVICAL SPONDYLOSIS WITH RADICULOPATHY: Primary | ICD-10-CM

## 2018-10-02 DIAGNOSIS — M50.10 CERVICAL DISC DISORDER WITH RADICULOPATHY: ICD-10-CM

## 2018-10-02 PROCEDURE — 72141 MRI NECK SPINE W/O DYE: CPT | Mod: TC

## 2018-10-02 PROCEDURE — 72050 X-RAY EXAM NECK SPINE 4/5VWS: CPT | Mod: TC

## 2018-10-02 PROCEDURE — 3078F DIAST BP <80 MM HG: CPT | Mod: CPTII,,, | Performed by: PHYSICIAN ASSISTANT

## 2018-10-02 PROCEDURE — 3074F SYST BP LT 130 MM HG: CPT | Mod: CPTII,,, | Performed by: PHYSICIAN ASSISTANT

## 2018-10-02 PROCEDURE — 72141 MRI NECK SPINE W/O DYE: CPT | Mod: 26,,, | Performed by: RADIOLOGY

## 2018-10-02 PROCEDURE — 72125 CT NECK SPINE W/O DYE: CPT | Mod: TC

## 2018-10-02 PROCEDURE — 72050 X-RAY EXAM NECK SPINE 4/5VWS: CPT | Mod: 26,,, | Performed by: RADIOLOGY

## 2018-10-02 PROCEDURE — 72125 CT NECK SPINE W/O DYE: CPT | Mod: 26,,, | Performed by: RADIOLOGY

## 2018-10-02 PROCEDURE — 1101F PT FALLS ASSESS-DOCD LE1/YR: CPT | Mod: CPTII,,, | Performed by: PHYSICIAN ASSISTANT

## 2018-10-02 PROCEDURE — 99214 OFFICE O/P EST MOD 30 MIN: CPT | Mod: S$PBB,,, | Performed by: PHYSICIAN ASSISTANT

## 2018-10-02 PROCEDURE — 99215 OFFICE O/P EST HI 40 MIN: CPT | Mod: PBBFAC,25 | Performed by: PHYSICIAN ASSISTANT

## 2018-10-02 PROCEDURE — 99999 PR PBB SHADOW E&M-EST. PATIENT-LVL V: CPT | Mod: PBBFAC,,, | Performed by: PHYSICIAN ASSISTANT

## 2018-10-02 RX ORDER — CYCLOBENZAPRINE HCL 5 MG
5 TABLET ORAL 3 TIMES DAILY PRN
Qty: 60 TABLET | Refills: 0 | Status: SHIPPED | OUTPATIENT
Start: 2018-10-02 | End: 2018-10-12

## 2018-10-03 ENCOUNTER — OFFICE VISIT (OUTPATIENT)
Dept: CARDIOLOGY | Facility: CLINIC | Age: 68
End: 2018-10-03
Payer: MEDICARE

## 2018-10-03 VITALS
OXYGEN SATURATION: 96 % | BODY MASS INDEX: 29.77 KG/M2 | HEART RATE: 67 BPM | HEIGHT: 72 IN | SYSTOLIC BLOOD PRESSURE: 115 MMHG | WEIGHT: 219.81 LBS | DIASTOLIC BLOOD PRESSURE: 63 MMHG

## 2018-10-03 DIAGNOSIS — G47.33 OSA (OBSTRUCTIVE SLEEP APNEA): ICD-10-CM

## 2018-10-03 DIAGNOSIS — Z95.820 S/P PERIPHERAL ARTERY ANGIOPLASTY WITH STENT PLACEMENT: ICD-10-CM

## 2018-10-03 DIAGNOSIS — Z79.4 TYPE 2 DIABETES MELLITUS WITH DIABETIC PERIPHERAL ANGIOPATHY WITHOUT GANGRENE, WITH LONG-TERM CURRENT USE OF INSULIN: ICD-10-CM

## 2018-10-03 DIAGNOSIS — Z95.1 S/P CABG X 5: ICD-10-CM

## 2018-10-03 DIAGNOSIS — E78.5 DYSLIPIDEMIA: Chronic | ICD-10-CM

## 2018-10-03 DIAGNOSIS — I25.10 CORONARY ARTERY DISEASE INVOLVING NATIVE CORONARY ARTERY OF NATIVE HEART WITHOUT ANGINA PECTORIS: Primary | Chronic | ICD-10-CM

## 2018-10-03 DIAGNOSIS — Z98.61 POST PTCA: Chronic | ICD-10-CM

## 2018-10-03 DIAGNOSIS — R06.01 ORTHOPNEA: ICD-10-CM

## 2018-10-03 DIAGNOSIS — E11.51 TYPE 2 DIABETES MELLITUS WITH DIABETIC PERIPHERAL ANGIOPATHY WITHOUT GANGRENE, WITH LONG-TERM CURRENT USE OF INSULIN: ICD-10-CM

## 2018-10-03 PROBLEM — Z01.810 PREOPERATIVE CARDIOVASCULAR EXAMINATION: Status: RESOLVED | Noted: 2017-01-18 | Resolved: 2018-10-03

## 2018-10-03 PROCEDURE — 99215 OFFICE O/P EST HI 40 MIN: CPT | Mod: PBBFAC | Performed by: NURSE PRACTITIONER

## 2018-10-03 PROCEDURE — 3078F DIAST BP <80 MM HG: CPT | Mod: CPTII,,, | Performed by: NURSE PRACTITIONER

## 2018-10-03 PROCEDURE — 1101F PT FALLS ASSESS-DOCD LE1/YR: CPT | Mod: CPTII,,, | Performed by: NURSE PRACTITIONER

## 2018-10-03 PROCEDURE — 99214 OFFICE O/P EST MOD 30 MIN: CPT | Mod: S$PBB,,, | Performed by: NURSE PRACTITIONER

## 2018-10-03 PROCEDURE — 3074F SYST BP LT 130 MM HG: CPT | Mod: CPTII,,, | Performed by: NURSE PRACTITIONER

## 2018-10-03 PROCEDURE — 99999 PR PBB SHADOW E&M-EST. PATIENT-LVL V: CPT | Mod: PBBFAC,,, | Performed by: NURSE PRACTITIONER

## 2018-10-03 RX ORDER — MONTELUKAST SODIUM 10 MG/1
TABLET ORAL
COMMUNITY
Start: 2018-09-21 | End: 2020-06-30

## 2018-10-03 NOTE — PROGRESS NOTES
CHIEF COMPLAINT:  Cervical radiculopathy.    HISTORY OF PRESENT ILLNESS:  Mr. Knutson is a 67-year-old male who has history of   cervical radiculopathy.  He was seen by Dr. Love and last saw on August 28th.  At   that time, the patient had an EMG that showed bilateral median neuropathies,   chronic left C5-C6 radiculopathy and chronic left L5-S1 radiculopathy.  The   patient was scheduled for updated CT and MRI scan of the cervical spine, which   she had done today.  He denies any new complaints since last visit.  He denies   any clumsiness of the hands.  He denies balance difficulty.  He has bowel or   bladder dysfunction.    PHYSICAL EXAMINATION:  GENERAL:  The patient is awake, alert, oriented, in no apparent distress.  NEUROLOGIC:  Cranial nerves II through XII are grossly intact.  His gait is   normal.  He can stand on his heels.  He can stand on his toes.  Strength in his   upper extremities is 5/5 bilaterally in deltoids, biceps, triceps, wrist   flexion, wrist extension, hand  and hand intrinsics.  Lower extremity   strength 5/5 bilaterally in hip flexion, knee flexion, knee extension,   dorsiflexion, plantarflexion and EHL.  Negative Katie sign, negative ankle   clonus.    IMAGING:  MRI of the cervical spine and CT of the cervical spine were both   personally reviewed.  There are multilevel cervical spondylosis with varying   degrees of foraminal stenosis, most significant at C4-C5 on the left and C6-C7   bilaterally.  The patient is without significant central canal stenosis or cord   compression visualized.  There is a subcutaneous cystic structure posterior to   C5.    ASSESSMENT AND PLAN:  Mr. Knutson is a 67-year-old male with cervical   radiculopathy and varying degrees of neural foraminal stenosis, most significant   at C4-C5 and C6-C7 as detailed above.  The patient is neurologically stable   without any focal weakness or signs or symptoms of myelopathy.  He had prior   epidural steroid injections  in the past with short-lived relief, but it has been   over a year.  He is interested in trying another round.  We will put in a   referral for Pain Management at [facility] to get this done.  We will put in a   prescription for physical therapy for his neck.  We will plan on seeing him back   in two to three months when he finishes this up.  The patient is to continue   Lyrica at this time.  I will refill his Flexeril if he develops any worsening   problems or has any questions or concerns in the meantime, he was encouraged to   give us a call.    DICTATED BY:  CAROLYN Clark Jr./ASTER  dd: 10/02/2018 13:54:47 (CDT)  td: 10/03/2018 10:57:04 (CDT)  Doc ID   #[id number]  Job ID #[id number]    CC:

## 2018-10-03 NOTE — PROGRESS NOTES
Mr. Knutson is a patient of Dr. Allen and was last seen in [facility] Cardiology Visit 3/1/18      Subjective:   Patient ID:  Sherif Knutson Jr. is a 67 y.o. male who presents for follow-up of Congestive Heart Failure (6 month f/u ) and Shortness of Breath    Problems:  CAD s/p CABGx5 in 2000 (SVG-OM1; LIMA-LAD; SVG-RCA; SVG-D1)   -TYSON RCA (2006); LCx (2013); OM3 (7/24/17)  Subclavian artery stenosis, left s/p stent in 2013  Carotid artery disease bilateral (JERAD 20-39%; LICA 40-49%)  HFpEF, 55-60%  HLD  HTN  EMELI  DM type II, uncontrolled    HPI  Mr. Knutson is in clinic today for routine follow up.  Continues to struggle to sleep because of orthopnea. Reports sleeping in the recliner. He had a LHC that revealed some stenotic areas that were not significant enough to require intervention.  He had an echo in 4/2018 that revealed normal systolic and diastolic function, EF 60-65%.  His IVC and PA pressures were normal.  Dr. Black sent him to sleep clinic and they dx him with EMELI but he is struggling with the cpap d/t a deviated septum.  He is doing slightly better after starting nose sprays.  Patient denies chest pain with exertion or at rest, palpitations, dizziness, syncope, edema, or claudication.  Reports no routine exercise.      Review of Systems   Constitution: Negative for decreased appetite, diaphoresis, weakness, malaise/fatigue, weight gain and weight loss.   Eyes: Negative for visual disturbance.   Cardiovascular: Positive for orthopnea and paroxysmal nocturnal dyspnea. Negative for chest pain, claudication, dyspnea on exertion, irregular heartbeat, leg swelling, near-syncope, palpitations and syncope.        Denies chest pressure   Respiratory: Negative for cough, hemoptysis, shortness of breath, sleep disturbances due to breathing and snoring.    Endocrine: Negative for cold intolerance and heat intolerance.   Hematologic/Lymphatic: Negative for bleeding problem. Does not bruise/bleed easily.   Musculoskeletal:  "Negative for myalgias.   Gastrointestinal: Negative for bloating, abdominal pain, anorexia, change in bowel habit, constipation, diarrhea, nausea and vomiting.   Neurological: Negative for difficulty with concentration, disturbances in coordination, excessive daytime sleepiness, dizziness, headaches, light-headedness, loss of balance and numbness.   Psychiatric/Behavioral: The patient does not have insomnia.      Allergies and current medications updated and reviewed:  Review of patient's allergies indicates:   Allergen Reactions    Sulfa (sulfonamide antibiotics) Photosensitivity    Zocor [simvastatin] Other (See Comments)     lathargic     Current Outpatient Medications   Medication Sig    aspirin 325 MG tablet Take 325 mg by mouth once daily.     azelastine (ASTELIN) 137 mcg (0.1 %) nasal spray Two sprays each nostril twice daily, sniff full until absorbed, then follow with fluticasone nasal spray    BD ULTRA-FINE JERMAINE PEN NEEDLES 32 gauge x 5/32" Ndle     clopidogrel (PLAVIX) 75 mg tablet Take 1 tablet (75 mg total) by mouth once daily.    CYANOCOBALAMIN, VITAMIN B-12, (VITAMIN B-12 ORAL) Take by mouth.    cyclobenzaprine (FLEXERIL) 5 MG tablet Take 1 tablet (5 mg total) by mouth 3 (three) times daily as needed for Muscle spasms.    EASY TOUCH 31 X 5/16 " Ndle     fluticasone (FLONASE) 50 mcg/actuation nasal spray Two sprays each nostril twice daily, use after spraying with nasal last in 1st    isosorbide mononitrate (IMDUR) 30 MG 24 hr tablet Take 1 tablet (30 mg total) by mouth once daily.    liraglutide 0.6 mg/0.1 mL, 18 mg/3 mL, subq PNIJ (VICTOZA 3-ANRDE) 0.6 mg/0.1 mL (18 mg/3 mL) PnIj Inject 1.8 mg into the skin once daily.    LYRICA 100 mg capsule 2 (two) times daily.     metoprolol tartrate (LOPRESSOR) 50 MG tablet TAKE 1 TABLET BY MOUTH TWICE A DAY    montelukast (SINGULAIR) 10 mg tablet     NOVOLOG 100 unit/mL injection     NOVOLOG FLEXPEN 100 unit/mL InPn pen as needed.     " "oxycodone-acetaminophen (PERCOCET)  mg per tablet Take 1 tablet by mouth every 6 (six) hours as needed for Pain.    ranitidine (ZANTAC) 300 MG tablet 300 mg 2 (two) times daily.     rosuvastatin (CRESTOR) 40 MG Tab Take 1 tablet (40 mg total) by mouth every evening.    sub-q insulin device, 20 unit (VGO 20) Lashay by Misc.(Non-Drug; Combo Route) route.    sub-q insulin device, 20 unit Lashay by Misc.(Non-Drug; Combo Route) route.    SURE COMFORT INSULIN SYRINGE 1/2 mL 31 x 5/16" Syrg     TRESIBA FLEXTOUCH U-200 200 unit/mL (3 mL) InPn     nitroGLYCERIN (NITROSTAT) 0.4 MG SL tablet Place 1 tablet (0.4 mg total) under the tongue every 5 (five) minutes as needed for Chest pain.     No current facility-administered medications for this visit.      Objective:     Right Arm BP - Sittin/63 (10/03/18 1046)  Left Arm BP - Sittin/63 (10/03/18 1046)    /63 (BP Location: Left arm, Patient Position: Sitting, BP Method: Large (Automatic))   Pulse 67   Ht 6' (1.829 m)   Wt 99.7 kg (219 lb 12.8 oz)   SpO2 96%   BMI 29.81 kg/m²     Physical Exam   Constitutional: He is oriented to person, place, and time. Vital signs are normal. He appears well-developed and well-nourished. He is active. No distress.   HENT:   Head: Normocephalic and atraumatic.   Eyes: Conjunctivae and lids are normal. No scleral icterus.   Neck: Neck supple. Normal carotid pulses, no hepatojugular reflux and no JVD present. Carotid bruit is not present.   Cardiovascular: Normal rate, regular rhythm, S1 normal, S2 normal and intact distal pulses. PMI is not displaced. Exam reveals no gallop and no friction rub.   No murmur heard.  Pulses:       Carotid pulses are 2+ on the right side, and 2+ on the left side.       Radial pulses are 2+ on the right side, and 2+ on the left side.        Dorsalis pedis pulses are 2+ on the right side, and 2+ on the left side.        Posterior tibial pulses are 1+ on the right side, and 1+ on the left " side.   Pulmonary/Chest: Effort normal and breath sounds normal. No respiratory distress. He has no decreased breath sounds. He has no wheezes. He has no rhonchi. He has no rales. He exhibits no tenderness.   Abdominal: Soft. Normal appearance and bowel sounds are normal. He exhibits no distension, no fluid wave, no abdominal bruit, no ascites and no pulsatile midline mass. There is no hepatosplenomegaly. There is no tenderness.   Musculoskeletal: He exhibits no edema.   Neurological: He is alert and oriented to person, place, and time. Gait normal.   Skin: Skin is warm, dry and intact. No rash noted. He is not diaphoretic. Nails show no clubbing.   Psychiatric: He has a normal mood and affect. His speech is normal and behavior is normal. Judgment and thought content normal. Cognition and memory are normal.   Nursing note and vitals reviewed.      Chemistry        Component Value Date/Time     (L) 04/03/2018 0711    K 3.9 04/03/2018 0711     04/03/2018 0711    CO2 27 04/03/2018 0711    BUN 19 04/03/2018 0711    CREATININE 1.4 04/03/2018 0711     (H) 04/03/2018 0711        Component Value Date/Time    CALCIUM 9.1 04/03/2018 0711    ALKPHOS 90 07/31/2017 0428    AST 26 07/31/2017 0428    ALT 26 07/31/2017 0428    BILITOT 1.0 07/31/2017 0428    ESTGFRAFRICA >60.0 04/03/2018 0711    EGFRNONAA 52.0 (A) 04/03/2018 0711        Lab Results   Component Value Date    HGBA1C 8.6 (H) 07/31/2017       Recent Labs   Lab  10/20/16   1224   07/30/17   2146   04/03/18   0711   WHITE BLOOD CELL COUNT  7.27   < >  6.26   < >  6.01   HEMOGLOBIN  15.2   < >  15.0   < >  14.5   HEMATOCRIT  45.5   < >  42.5   < >  43.6   MCV  89   < >  85   < >  91   PLATELETS  172   < >  158   < >  160   BNP   --    --   22   --    --    TSH  1.354   --    --    --    --    CHOLESTEROL  115 L   --    --    --    --    HDL  46   --    --    --    --    LDL CHOLESTEROL  41.6 L   --    --    --    --    TRIGLYCERIDES  137   --    --    --     --    HDL/CHOLESTEROL RATIO  40.0   --    --    --    --     < > = values in this interval not displayed.       Recent Labs   Lab  07/18/17   1502  07/31/17   0428   INR  1.0  1.0        Test(s) Reviewed  I have reviewed the following in detail:  [] Stress test   [x] Angiography   [x] Echocardiogram   [x] Labs   [] Other:         Assessment/Plan:     Coronary artery disease involving native coronary artery of native heart without angina pectoris  Comments:  Asymptomatic. Taking ASA and statin therapy. No changes.  Orders:  -     Comprehensive metabolic panel; Future; Expected date: 04/04/2019  -     Lipid panel; Future; Expected date: 04/04/2019  -     Magnesium; Future; Expected date: 04/03/2019    Post PTCA: RCA (2006); LCx (2013); OM3 (2017)    S/P CABG x 5 in 2000    S/P subclavian artery angioplasty with stent placement in 8/2013    Dyslipidemia  Comments:  LDL at goal <70. Continue rosuvastatin 40mg    Type 2 diabetes mellitus with diabetic peripheral angiopathy without gangrene, with long-term current use of insulin  Comments:  Uncontrolled, A1C not at goal <7. Encouraged f/u with PCP and tighter BG control    EMELI (obstructive sleep apnea)    Orthopnea  Comments:  Likely multifactorial. Has signficant back problems, untreated EMELI, and deviated septum with chronic rhinitis. Instructed to f/u with Dr. Rascon in ENT    Requested outside labs from PCP    Patient was discussed with but not examined by Dr. García    Follow-up in about 6 months (around 4/3/2019).

## 2018-10-23 ENCOUNTER — CLINICAL SUPPORT (OUTPATIENT)
Dept: REHABILITATION | Facility: HOSPITAL | Age: 68
End: 2018-10-23
Payer: MEDICARE

## 2018-10-23 DIAGNOSIS — M62.81 MUSCLE WEAKNESS: ICD-10-CM

## 2018-10-23 DIAGNOSIS — M54.2 NECK PAIN: ICD-10-CM

## 2018-10-23 DIAGNOSIS — R29.898 DECREASED ROM OF NECK: ICD-10-CM

## 2018-10-23 PROCEDURE — 97140 MANUAL THERAPY 1/> REGIONS: CPT | Mod: PN

## 2018-10-23 PROCEDURE — 97110 THERAPEUTIC EXERCISES: CPT | Mod: PN

## 2018-10-23 PROCEDURE — G8979 MOBILITY GOAL STATUS: HCPCS | Mod: CJ,PN

## 2018-10-23 PROCEDURE — G8978 MOBILITY CURRENT STATUS: HCPCS | Mod: CK,PN

## 2018-10-23 NOTE — PROGRESS NOTES
"  Physical Therapy Initial Evaluation     Name: Sherif Knutson Jr.  Clinic Number: [id number]    Diagnosis:   Encounter Diagnoses   Name Primary?    Neck pain     Muscle weakness     Decreased ROM of neck      Physician: Moises Harding, *    Visit # 1 of 12 authorized   Time In: 955  Time Out: 1050  Total time: 50 mins (1:1 with PT for duration of session)    Subjective     Patient states: that he has had pain for a long time. He injured his back,neck, and L shoulder badly in the 80s and had surgery, but he would not let them touch his neck. He said that he has nerves pinched throughout his neck shown on the recent MRI he got. He said that he was doing PT at [facility] 6 months ago and he did a week of therapy before they said it was not helping and then sent him to the doctor at [facility]. He reports that the PT was making things worse but he said that he feels like they did not do PT long enough to see if it would help in the long run. 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 He reported that he used to go to the chiropractor and that seemed to help for some time each time he went-- mostly doing manipulations to his C spine.   Pain Scale: Sherif rates pain on a scale of 0-10 to be 7 at worst; 7 currently; 0 at best .  Imaging: "Multilevel spondylosis, most pronounced at C4-5 with there is mild spinal canal stenosis as well as severe left and moderate right neural foraminal narrowing."    Objective       Sherif received 20 minutes of therapeutic exercise including:  Chin tuck in supine   Levator str 30" x 3  UT str 30" x3  Radial nerve glide B x10  Median nerve glide B x10   Towel snag 5" x5 ea side   Cervical ext with towel x15     Sherif received 15 minutes of manual therapy including:  Manual cervical traction 5 min  STM cervcial psp/lavator at C3-4   Upglides C4-7 L to R     Written Home Exercises " Provided:   Sherif rodriguez good understanding of the education provided. Patient demo good return demo of skill of exercises.    Assessment     Pt is a 67 y.o. Male with chronic neck pain and decreased ROM. He has hx of an injured RC in the L shoulder since his accident in the 80s, likely contributing to overuse of his neck mm. He now has significant degenerative changes in his cervical spine and weakness throughout his shoulders and neck causing decreased ROM and pain. He had neural tension in his radial and median nerves B. He had decreased segmental mobility from C3-7 from L to R with increased pain at C4-5. His L shoulder was significantly weaker than the R with shoulder abd and shoulder flexion. The patient would benefit from improving cervical mm strength, rotator cuff strength, improving scapular stability, improving flexibility, and cervcial ROM to improve his ability to perform functional activities and improve QOL.     Pt prognosis is Good.  Pt will benefit from skilled outpatient physical therapy to address the above stated deficits, provide pt/family education and to maximize pt's level of independence.     Goals as Follows:  Short Term GOALS: 4 weeks. Pt agrees with goals set.  1. Patient demonstrates independence with HEP.   2. Patient demonstrates independence with Postural Awareness.   3. Patient demonstrates independence with body mechanics.   4. Pt will improve cervical ext and rotation ROM to limited less than 25% to help improve his mobility.     Long Term GOALS: 12 weeks. Pt agrees with goals set.  1. Patient will be able to perform continuous exercise of his cervical mm and shoulders for 45 mins or more to improve tolerance to functional activities.   2. Patient demonstrates increased strength BUE's to 5/5 or greater to improve tolerance to functional activities.   3. Patient demonstrates improved overall function per FOTO  Neck Survey to 40% or less.       Plan     Certification from  10/23/18-1/23/19    Outpatient physical therapy 2  times weekly to include: pt ed, hep, therapeutic exercises, neuromuscular re-education/ balance exercises, joint mobilizations, and modalities prn. Cont PT for  12 weeks. Pt may be seen by PTA as part of the rehabilitation team.     Therapist: Nickie Turk, PT

## 2018-10-23 NOTE — PLAN OF CARE
"  Physical Therapy Initial Evaluation     Name: Sherif Knutson Jr.  Clinic Number: [id number]    Diagnosis:   Encounter Diagnoses   Name Primary?    Neck pain     Muscle weakness     Decreased ROM of neck      Physician: Moises Harding, *  Treatment Orders: PT Eval and Treat  Past Medical History:   Diagnosis Date    CHF (congestive heart failure) 8/29/2017    Coronary artery disease 2006, 5/2013    s/p pci to rca (2006), LCx (5/2013)    DM (diabetes mellitus)     HLD (hyperlipidemia)     HTN (hypertension)     Kidney stone     EMELI (obstructive sleep apnea) 11/2/2015    Subclavian artery stenosis, left      Current Outpatient Medications   Medication Sig    aspirin 325 MG tablet Take 325 mg by mouth once daily.     azelastine (ASTELIN) 137 mcg (0.1 %) nasal spray Two sprays each nostril twice daily, sniff full until absorbed, then follow with fluticasone nasal spray    BD ULTRA-FINE JERMAINE PEN NEEDLES 32 gauge x 5/32" Ndle     clopidogrel (PLAVIX) 75 mg tablet Take 1 tablet (75 mg total) by mouth once daily.    CYANOCOBALAMIN, VITAMIN B-12, (VITAMIN B-12 ORAL) Take by mouth.    EASY TOUCH 31 X 5/16 " Ndle     fluticasone (FLONASE) 50 mcg/actuation nasal spray Two sprays each nostril twice daily, use after spraying with nasal last in 1st    isosorbide mononitrate (IMDUR) 30 MG 24 hr tablet Take 1 tablet (30 mg total) by mouth once daily.    liraglutide 0.6 mg/0.1 mL, 18 mg/3 mL, subq PNIJ (VICTOZA 3-ANDRE) 0.6 mg/0.1 mL (18 mg/3 mL) PnIj Inject 1.8 mg into the skin once daily.    LYRICA 100 mg capsule 2 (two) times daily.     metoprolol tartrate (LOPRESSOR) 50 MG tablet TAKE 1 TABLET BY MOUTH TWICE A DAY    montelukast (SINGULAIR) 10 mg tablet     nitroGLYCERIN (NITROSTAT) 0.4 MG SL tablet Place 1 tablet (0.4 mg total) under the tongue every 5 (five) minutes as needed for Chest pain.    NOVOLOG 100 unit/mL injection     NOVOLOG FLEXPEN 100 unit/mL InPn pen as needed.     " "oxycodone-acetaminophen (PERCOCET)  mg per tablet Take 1 tablet by mouth every 6 (six) hours as needed for Pain.    ranitidine (ZANTAC) 300 MG tablet 300 mg 2 (two) times daily.     rosuvastatin (CRESTOR) 40 MG Tab Take 1 tablet (40 mg total) by mouth every evening.    sub-q insulin device, 20 unit (VGO 20) Lashay by Misc.(Non-Drug; Combo Route) route.    sub-q insulin device, 20 unit Lashay by Misc.(Non-Drug; Combo Route) route.    SURE COMFORT INSULIN SYRINGE 1/2 mL 31 x 5/16" Syrg     TRESIBA FLEXTOUCH U-200 200 unit/mL (3 mL) InPn      No current facility-administered medications for this visit.      Review of patient's allergies indicates:   Allergen Reactions    Sulfa (sulfonamide antibiotics) Photosensitivity    Zocor [simvastatin] Other (See Comments)     lathargic     History    Co-morbidities and personal factors that may impact the plan of care Examination    Body Structures and Functions, activity limitations and participation restrictions that may impact the plan of care Clinical Presentation Decision Making/ Complexity Score   Co-morbidities:     CHF, CAD, HLD, HTN, subclavian artery stenosis,DM, kidney stone, obstructive sleep apnea             Personal Factors:age and chronicity of condition, hobbies  Body Regions: neck and L shoulder          Body Systems:  musculoskeletal (ROM, strength, endurance, flexibility, gait); neuromuscular (balance, posture, motor control, coordination)     stable    uncomplicated  precitable  Low Complexity    FOTO Neck Survey    Score: 49% Limitation     Visit # 1 of 12 authorized     Subjective     Patient states: that he has had pain for a long time. He injured his back,neck, and L shoulder badly in the 80s and had surgery, but he would not let them touch his neck. He said that he has nerves pinched throughout his neck shown on the recent MRI he got. He said that he was doing PT at [facility] ago and he did a week of therapy " "before they said it was not helping and then sent him to the doctor at [facility]. He reports that the PT was making things worse but he said that he feels like they did not do PT long enough to see if it would help in the long run. 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 He reported that he used to go to the chiropractor and that seemed to help for some time each time he went-- mostly doing manipulations to his C spine.   Pain Scale: Sherif rates pain on a scale of 0-10 to be 7 at worst; 7 currently; 0 at best .  Pain Scale: Sherif rates pain on a scale of 0-10 to be 7 at worst; 7 currently; 0 at best .  Onset: gradual  Radicular symptoms:  Yes, numbness in a medial nerve pattern bilaterally   Aggravating factors:   Strenuous work, laying under the car for long periods of time, lifting   Easing factors:  Hot and cold packs; rest   Prior Therapy: yes, at [facility] 6 months ago   Functional Deficits Leading to Referral: unable to participate in hobbies   Occupation:  Retired; mechanical work as a hobby                        Pts goals:  To decreased pain and improve ability to perform hobbies   Imaging: "Multilevel spondylosis, most pronounced at C4-5 with there is mild spinal canal stenosis as well as severe left and moderate right neural foraminal narrowing."    Objective     Posture Alignment: forward head    CERVICAL SPINE AROM:   Flexion: WFL    Extension: Limited 75 %- pain    Left Sidebend:  Limited 50%- pain down shoulder   Right Sidebend: Limited 50%   Left Rotation: Limited 25%- tight    Right Rotation: Limited 10%     SEGMENTAL MOBILITY: hypomobility C4-5, 5-6, 6-7 on L side     UPPER EXTREMITY STRENGTH:   Left Right   Shoulder Flexion 4+/5 5/5   Shoulder Abduction 3/5 5/5   Shoulder Internal Rotation 5/5 5/5   Shoulder External Rotation 4+/5 5/5   Elbow Flexion  5/5 5/5   Elbow Extension " "5/5 5/5   Wrist Flexion 5/5 5/5   Wrist Extension 5/5 5/5       DTR's:   Left Right   Biceps Tendon 2+ 2+   Brachioradialis 2+ 2+   Triceps Tendon 2+ 2+     Dermatomes: Sensation: Light Touch: Intact  Myotomes: WNL    Special Tests:   Left Right   VBI - -   transvers ligament - -   Spurling - -     Pt/family was provided educational information, including: role of PT, goals for PT, scheduling - pt verbalized understanding. Discussed insurance limitations with pt.     Treatment     Time In: 955  Time Out: 1050  Total time: 50 mins     PT Evaluation Completed? Yes  Discussed Plan of Care with patient: Yes    Sherif received 20 minutes of therapeutic exercise including:  Chin tuck in supine   Levator str 30" x 3  UT str 30" x3  Radial nerve glide B x10  Median nerve glide B x10   Towel snag 5" x5 ea side   Cervical ext with towel x15     Sherif received 15 minutes of manual therapy including:  Manual cervical traction 5 min  STM cervcial psp/lavator at C3-4   Upglides C4-7 L to R     Written Home Exercises Provided:   Sherif demo good understanding of the education provided. Patient demo good return demo of skill of exercises.    Assessment     Pt is a 67 y.o. Male with chronic neck pain and decreased ROM. He has hx of an injured RC in the L shoulder since his accident in the 80s, likely contributing to overuse of his neck mm. He now has significant degenerative changes in his cervical spine and weakness throughout his shoulders and neck causing decreased ROM and pain. He had neural tension in his radial and median nerves B. He had decreased segmental mobility from C3-7 from L to R with increased pain at C4-5. His L shoulder was significantly weaker than the R with shoulder abd and shoulder flexion. The patient would benefit from improving cervical mm strength, rotator cuff strength, improving scapular stability, improving flexibility, and cervcial ROM to improve his ability to perform functional activities and improve " QOL.     Pt prognosis is Good.  Pt will benefit from skilled outpatient physical therapy to address the above stated deficits, provide pt/family education and to maximize pt's level of independence.     Medical necessity is demonstrated by the following IMPAIRMENTS/PROBLEMS:  1. Increased Pain  2. Decreased Segmental Mobility & Decreased ROM  3. Decreased Cervical and L shoulder strength   4. Decreased Flexibility of shoulder  5. Decreased Tolerance to Functional Activities    Pt's spiritual, cultural and educational needs considered and pt agreeable to plan of care and goals as stated below:     Anticipated Barriers for physical therapy: age, chronicity of condition    Short Term GOALS: 4 weeks. Pt agrees with goals set.  1. Patient demonstrates independence with HEP.   2. Patient demonstrates independence with Postural Awareness.   3. Patient demonstrates independence with body mechanics.   4. Pt will improve cervical ext and rotation ROM to limited less than 25% to help improve his mobility.     Long Term GOALS: 12 weeks. Pt agrees with goals set.  1. Patient will be able to perform continuous exercise of his cervical mm and shoulders for 45 mins or more to improve tolerance to functional activities.   2. Patient demonstrates increased strength BUE's to 5/5 or greater to improve tolerance to functional activities.   3. Patient demonstrates improved overall function per FOTO  Neck Survey to 40% or less.     Functional Limitations Reports - G Codes  Category: Mobility  Tool: FOTO Neck Survey  Score: 49% limited    TEST SCORE Modifier Impairment Limitation Restriction   0 CH 0 % impaired, limited or restricted   1-9 CI @ least 1% but less than 20% impaired, limited or restricted   10-19 CJ @ least 20%<40% impaired, limited or restricted   20-29 CK @ least 40%<60% impaired, limited or restricted   30-39 CL @ least 60% <80% impaired, limited or restricted   40-49 CM @ least 80%<100% impaired limited or restricted    50/50 % impaired, limited or restricted       Current/  CK (@ least 40%<60% impaired, limited or restricted)  Goal/ : CJ (@ least 20%<40% impaired, limited or restricted)  Discharge/   CJ    Plan     Certification from 10/23/18-1/23/19    Outpatient physical therapy 2  times weekly to include: pt ed, hep, therapeutic exercises, neuromuscular re-education/ balance exercises, joint mobilizations, and modalities prn. Cont PT for  12 weeks. Pt may be seen by PTA as part of the rehabilitation team.     Therapist: Nickie Turk, PT

## 2018-10-30 ENCOUNTER — DOCUMENTATION ONLY (OUTPATIENT)
Dept: REHABILITATION | Facility: HOSPITAL | Age: 68
End: 2018-10-30

## 2018-10-30 NOTE — PROGRESS NOTES
Missed Visit/Cancellation     Date: 10/30/28    Canceled Number: 1             Pt initially had visit scheduled for today at 1030.  Pt with NS visit today.  Front office called pt, however, unable to leave message due to mailbox being full.

## 2018-11-01 ENCOUNTER — CLINICAL SUPPORT (OUTPATIENT)
Dept: REHABILITATION | Facility: HOSPITAL | Age: 68
End: 2018-11-01
Payer: MEDICARE

## 2018-11-01 DIAGNOSIS — R29.898 DECREASED ROM OF NECK: ICD-10-CM

## 2018-11-01 DIAGNOSIS — M62.81 MUSCLE WEAKNESS: ICD-10-CM

## 2018-11-01 DIAGNOSIS — M54.2 NECK PAIN: Primary | ICD-10-CM

## 2018-11-01 PROCEDURE — 97140 MANUAL THERAPY 1/> REGIONS: CPT | Mod: PN

## 2018-11-01 NOTE — PROGRESS NOTES
"  Physical Therapy Initial Evaluation     Name: Sherif Knutson Jr.  Clinic Number: [id number]    Diagnosis:   No diagnosis found.  Physician: Moises Harding, *    Visit # 2 of 12 authorized   Time In: 1025  Time Out: 1130  Total time: 65 mins (1:1 with PT for duration of session)    Subjective     Patient states: that his neck is feeling stiff. He said that he has been doing his HEP.   Pain Scale: Sherif rates pain on a scale of 0-10 to be  6 currently.      Objective     Sherif received 50  minutes of therapeutic exercise including:  +UBE 3/3  Chin tuck in supine 5" 2x10   Levator str 30" x 3  UT str 30" x3  Radial nerve glide B x10  Median nerve glide B x10   Towel snag 5" x5 ea side   Cervical ext with towel 2x10    +rows GTB 3x30  +Shoulder IR RTB 3x10  +shoulder ER RTB 3x10  +SA wall slides 2x12  + supine punches 4# 2x15     Sherif received 15 minutes of manual therapy including:  Manual cervical traction  STM cervcial psp  C spine side glides L>R C4-5, 5-6,6-7    Written Home Exercises Provided:   Sherif demo good understanding of the education provided. Patient demo good return demo of skill of exercises.    Assessment     Pt tolerated new exercises well today. He continues to have tightness in his cervcial psps and upper traps. He had decreased mobility in his C spine with extension, SB, and Rot. Increased tension and tenderness with side glides from L to R. Pt reported decreased pain and improved mobility after session today.     Pt prognosis is Good.  Pt will benefit from skilled outpatient physical therapy to address the above stated deficits, provide pt/family education and to maximize pt's level of independence.     Goals as Follows:  Short Term GOALS: 4 weeks. Pt agrees with goals set.  1. Patient demonstrates independence with HEP.   2. Patient demonstrates independence with Postural Awareness.   3. Patient demonstrates independence with body mechanics.   4. Pt will improve cervical ext and rotation ROM " to limited less than 25% to help improve his mobility.     Long Term GOALS: 12 weeks. Pt agrees with goals set.  1. Patient will be able to perform continuous exercise of his cervical mm and shoulders for 45 mins or more to improve tolerance to functional activities.   2. Patient demonstrates increased strength BUE's to 5/5 or greater to improve tolerance to functional activities.   3. Patient demonstrates improved overall function per FOTO  Neck Survey to 40% or less.       Plan     Certification from 10/23/18-1/23/19    Outpatient physical therapy 2  times weekly to include: pt ed, hep, therapeutic exercises, neuromuscular re-education/ balance exercises, joint mobilizations, and modalities prn. Cont PT for  12 weeks. Pt may be seen by PTA as part of the rehabilitation team.     Therapist: Nickie Turk, PT

## 2018-11-06 ENCOUNTER — CLINICAL SUPPORT (OUTPATIENT)
Dept: REHABILITATION | Facility: HOSPITAL | Age: 68
End: 2018-11-06
Payer: MEDICARE

## 2018-11-06 DIAGNOSIS — M54.2 NECK PAIN: Primary | ICD-10-CM

## 2018-11-06 DIAGNOSIS — R29.898 DECREASED ROM OF NECK: ICD-10-CM

## 2018-11-06 DIAGNOSIS — M62.81 MUSCLE WEAKNESS: ICD-10-CM

## 2018-11-06 PROCEDURE — 97110 THERAPEUTIC EXERCISES: CPT | Mod: PN

## 2018-11-06 NOTE — PROGRESS NOTES
"  Physical Therapy Initial Evaluation     Name: Sherif Knutson Jr.  Clinic Number: [id number]    Diagnosis:   Encounter Diagnoses   Name Primary?    Neck pain Yes    Muscle weakness     Decreased ROM of neck      Physician: Moises Harding, *    Visit # 3 of 12 authorized   Time In: 0917  Time Out: 1015  Total time: 58 mins (1:1 with PT for 24 minutes)    Subjective     Patient states: that he is compliant with HEP  Pain Scale: Sherif rates pain on a scale of 0-10 to be  6 currently.      Objective     Sherif received 48  minutes of therapeutic exercise including:  UBE 3/3  Chin tuck in supine 5" 2x10   Levator str 30" x 3  UT str 30" x3  Radial nerve glide B x10  Median nerve glide B x10   Towel snag for rotation 5" x5 ea side   Cervical ext with towel 2x10    rows GTB 3x30  Shoulder IR RTB 3x10  shoulder ER RTB 3x10  SA wall slides 2x12  supine punches 4# 2x15     Sherif received 10 minutes of manual therapy including:  Manual cervical traction  STM cervcial psp  C spine side glides L>R C4-5, 5-6,6-7 (resume when seen by PT)    Written Home Exercises Provided:   Sherif demo good understanding of the education provided. Patient demo good return demo of skill of exercises.    Assessment     Pt tolerated treatment good today with great retention of exercises.  Displays decreased cervical ROM with extension and side bending.  Experienced clicking/catching and pain to R side with L levator and upper trap stretching.  Increased tightness throughout upper trap, levator scap, and suboccipital musculature.  Pt prognosis is Good.  Pt will benefit from skilled outpatient physical therapy to address the above stated deficits, provide pt/family education and to maximize pt's level of independence.     Goals as Follows:  Short Term GOALS: 4 weeks. Pt agrees with goals set.  1. Patient demonstrates independence with HEP.   2. Patient demonstrates independence with Postural Awareness.   3. Patient demonstrates independence with " body mechanics.   4. Pt will improve cervical ext and rotation ROM to limited less than 25% to help improve his mobility.     Long Term GOALS: 12 weeks. Pt agrees with goals set.  1. Patient will be able to perform continuous exercise of his cervical mm and shoulders for 45 mins or more to improve tolerance to functional activities.   2. Patient demonstrates increased strength BUE's to 5/5 or greater to improve tolerance to functional activities.   3. Patient demonstrates improved overall function per FOTO  Neck Survey to 40% or less.       Plan     Certification from 10/23/18-1/23/19    Outpatient physical therapy 2  times weekly to include: pt ed, hep, therapeutic exercises, neuromuscular re-education/ balance exercises, joint mobilizations, and modalities prn. Cont PT for  12 weeks. Pt may be seen by PTA as part of the rehabilitation team.     Therapist: Arianna Ferris PTA

## 2018-11-08 ENCOUNTER — CLINICAL SUPPORT (OUTPATIENT)
Dept: REHABILITATION | Facility: HOSPITAL | Age: 68
End: 2018-11-08
Payer: MEDICARE

## 2018-11-08 DIAGNOSIS — M54.2 NECK PAIN: Primary | ICD-10-CM

## 2018-11-08 DIAGNOSIS — R29.898 DECREASED ROM OF NECK: ICD-10-CM

## 2018-11-08 DIAGNOSIS — M62.81 MUSCLE WEAKNESS: ICD-10-CM

## 2018-11-08 PROCEDURE — 97140 MANUAL THERAPY 1/> REGIONS: CPT | Mod: PN

## 2018-11-08 PROCEDURE — 97110 THERAPEUTIC EXERCISES: CPT | Mod: PN

## 2018-11-08 NOTE — PROGRESS NOTES
"  Physical Therapy Treatment Note     Name: Sherif Knutson Jr.  Clinic Number: [id number]    Diagnosis:   Encounter Diagnoses   Name Primary?    Neck pain Yes    Muscle weakness     Decreased ROM of neck      Physician: Moises Harding, *    Visit # 4 of 12 authorized   Time In: 1105  Time Out: 1200  Total time: 55 mins (1:1 with PTA for duration of treatment session )    Subjective     Patient states: that he is doing good today.  Patient states that he does not have pain but soreness.   Pain Scale: Sherif rates pain on a scale of 0-10 to be  0 currently.      Objective     Sherif received 45  minutes of therapeutic exercise including:  UBE 3/3  Chin tuck in supine 5" 2x10   Levator str 30" x 3  UT str 30" x3  Radial nerve glide B x10  Median nerve glide B x10   Towel snag for rotation 5" x5 ea side   Cervical ext with towel 2x10    rows GTB 3x30  + BUE Extension Green TB 3 x 10   Shoulder IR Red TB 3x10  shoulder ER Red TB 3x10  SA wall slides 2x12  supine punches 4# 2x15     Sherif received 10 minutes of manual therapy including:  Manual cervical traction  STM cervcial psp  C spine side glides L>R C4-5, 5-6,6-7 (resume when seen by PT)    Written Home Exercises Provided:   Sherif demo good understanding of the education provided. Patient demo good return demo of skill of exercises.    Assessment     Patient tolerated treatment session well today.  Patient with good response to exercises without provocation of pain.  Patient will benefit from continued progression of scapular strengthening.   Pt prognosis is Good.  Pt will benefit from skilled outpatient physical therapy to address the above stated deficits, provide pt/family education and to maximize pt's level of independence.     Goals as Follows:  Short Term GOALS: 4 weeks. Pt agrees with goals set.  1. Patient demonstrates independence with HEP.   2. Patient demonstrates independence with Postural Awareness.   3. Patient demonstrates independence with body " mechanics.   4. Pt will improve cervical ext and rotation ROM to limited less than 25% to help improve his mobility.     Long Term GOALS: 12 weeks. Pt agrees with goals set.  1. Patient will be able to perform continuous exercise of his cervical mm and shoulders for 45 mins or more to improve tolerance to functional activities.   2. Patient demonstrates increased strength BUE's to 5/5 or greater to improve tolerance to functional activities.   3. Patient demonstrates improved overall function per FOTO  Neck Survey to 40% or less.       Plan     Certification from 10/23/18-1/23/19    Outpatient physical therapy 2  times weekly to include: pt ed, hep, therapeutic exercises, neuromuscular re-education/ balance exercises, joint mobilizations, and modalities prn. Cont PT for  12 weeks. Pt may be seen by PTA as part of the rehabilitation team.     Therapist: Kay Cadena PTA

## 2018-11-15 ENCOUNTER — CLINICAL SUPPORT (OUTPATIENT)
Dept: REHABILITATION | Facility: HOSPITAL | Age: 68
End: 2018-11-15
Payer: MEDICARE

## 2018-11-15 DIAGNOSIS — R29.898 DECREASED ROM OF NECK: ICD-10-CM

## 2018-11-15 DIAGNOSIS — J34.2 NASAL SEPTAL DEVIATION: ICD-10-CM

## 2018-11-15 DIAGNOSIS — M54.2 NECK PAIN: ICD-10-CM

## 2018-11-15 DIAGNOSIS — M62.81 MUSCLE WEAKNESS: ICD-10-CM

## 2018-11-15 PROCEDURE — G8979 MOBILITY GOAL STATUS: HCPCS | Mod: CJ,PN

## 2018-11-15 PROCEDURE — G8978 MOBILITY CURRENT STATUS: HCPCS | Mod: CK,PN

## 2018-11-15 PROCEDURE — 97140 MANUAL THERAPY 1/> REGIONS: CPT | Mod: PN

## 2018-11-15 PROCEDURE — 97110 THERAPEUTIC EXERCISES: CPT | Mod: PN

## 2018-11-15 NOTE — PROGRESS NOTES
"  Physical Therapy Treatment Note     Name: Sherif Knutson Jr.  Clinic Number: [id number]    Diagnosis:   Encounter Diagnoses   Name Primary?    Neck pain     Muscle weakness     Decreased ROM of neck      Physician: Moises Harding, *    Visit # 5 of 12 authorized   Time In: 1030  Time Out: 1130  Total time: 60 mins (1:1 with PT for 30 of treatment session )    Subjective     Patient states: that he feels a little stiff due to the cold weather.   Pain Scale: Sherif rates pain on a scale of 0-10 to be  0 currently.      Objective     Sherif received 45  minutes of therapeutic exercise including:    UBE 3/3  Levator str 30" x 3  UT str 30" x3  Towel snag for rotation 5" x5 ea side   Cervical ext with towel 2x10     Chin tuck seated 5" 2x10   Radial nerve glide B x10  Median nerve glide B x10   +SA overhead reaching 2x15   rows GTB 3x30  BUE Extension Green TB 3 x 10   Shoulder IR Red TB 3x10  shoulder ER Red TB 3x10  SA wall slides 2x12  supine punches 4# 2x15       Sherif received 15 minutes of manual therapy including:  Manual cervical traction  STM cervcial psp  C spine side glides L>R C4-5, 5-6,6-7 (resume when seen by PT)    Written Home Exercises Provided:   Sherif demo good understanding of the education provided. Patient demo good return demo of skill of exercises.    Assessment     Patient tolerated session well today. He continues to be limited in cervical extension and rotation. His cervical ROM with side glides is limited with L to R side glides at lower to mid cervical spine and with up glides. He has been consistently performing HEP and making good progress towards goals. His FOTO score did not make any significant changes after 5 visits.   Pt prognosis is Good.  Pt will benefit from skilled outpatient physical therapy to address the above stated deficits, provide pt/family education and to maximize pt's level of independence.     Goals as Follows:  Short Term GOALS: 4 weeks. Pt agrees with goals " set.  1. Patient demonstrates independence with HEP.   2. Patient demonstrates independence with Postural Awareness.   3. Patient demonstrates independence with body mechanics.   4. Pt will improve cervical ext and rotation ROM to limited less than 25% to help improve his mobility.     Long Term GOALS: 12 weeks. Pt agrees with goals set.  1. Patient will be able to perform continuous exercise of his cervical mm and shoulders for 45 mins or more to improve tolerance to functional activities.   2. Patient demonstrates increased strength BUE's to 5/5 or greater to improve tolerance to functional activities.   3. Patient demonstrates improved overall function per FOTO  Neck Survey to 40% or less.     FOTO Neck Survey visit #5: 50% limited     Plan     Certification from 10/23/18-1/23/19    Continue outpatient physical therapy 2  times weekly to include: pt ed, hep, therapeutic exercises, neuromuscular re-education/ balance exercises, joint mobilizations, and modalities prn. Cont PT for  12 weeks. Pt may be seen by PTA as part of the rehabilitation team.     Therapist: Nickie Turk, PT

## 2018-11-20 ENCOUNTER — CLINICAL SUPPORT (OUTPATIENT)
Dept: REHABILITATION | Facility: HOSPITAL | Age: 68
End: 2018-11-20
Payer: MEDICARE

## 2018-11-20 DIAGNOSIS — M54.2 NECK PAIN: Primary | ICD-10-CM

## 2018-11-20 DIAGNOSIS — M62.81 MUSCLE WEAKNESS: ICD-10-CM

## 2018-11-20 DIAGNOSIS — R29.898 DECREASED ROM OF NECK: ICD-10-CM

## 2018-11-20 PROCEDURE — 97110 THERAPEUTIC EXERCISES: CPT | Mod: PN

## 2018-11-20 NOTE — PROGRESS NOTES
"  Physical Therapy Treatment Note     Name: Sherif Knutson Jr.  Clinic Number: [id number]    Diagnosis:   Encounter Diagnoses   Name Primary?    Neck pain Yes    Muscle weakness     Decreased ROM of neck      Physician: Moises Harding, *    Visit # 6 of 12 authorized     Time In: 1025  Time Out: 1130  Total time: 65 mins (1:1 with PTA for 30 of treatment session )    Subjective     Patient states: that he continues to feel the same.  Patient reports his biggest problem now is that the muscles in his neck get tired very quick, especially when working on his car.    Pain Scale: Sherif rates pain on a scale of 0-10 to be  0 currently.      Objective     Sherif received 55  minutes of therapeutic exercise including:    UBE 3/3  + Corner stretch 3 x 20''   + SA overhead reaching with lift off 2x10  + Standing chin tucks with O float 20 x 5'' hold  + CROM Rotations with O float x 20   + Standing alt flexion / abduction against wall with 1/2 foam roll x 10   + Standing horizontal abduction with 1/2 foal roll against wall x 20   rows GTB 3x30  BUE Extension Green TB 3 x 10   Shoulder IR Red TB 3x10  shoulder ER Red TB 3x10  supine punches 4# 2x15   + Supine shoulder flexion with 4# dowel 2 x 10   Levator str 30" x 2  UT str 30" x 2     NP   Towel snag for rotation 5" x5 ea side   Cervical ext with towel 2x10   Chin tuck seated 5" 2x10   Radial nerve glide B x10  Median nerve glide B x10   SA overhead reaching 2x15   SA wall slides 2x12      Sherif received 10 minutes of manual therapy including:  Manual cervical traction  STM cervcial psp  C spine side glides L>R C4-5, 5-6,6-7 (resume when seen by PT)    Written Home Exercises Provided:   Sherif demo good understanding of the education provided. Patient demo good return demo of skill of exercises.    Assessment     Patient tolerated session well today. Patient with great response to progression of cervical and shoulder strengthening without difficulty.  Pt displayed " improvements in cervical range of motion and posture post treatment session.   Pt prognosis is Good.  Pt will benefit from skilled outpatient physical therapy to address the above stated deficits, provide pt/family education and to maximize pt's level of independence.     Goals as Follows:  Short Term GOALS: 4 weeks. Pt agrees with goals set.  1. Patient demonstrates independence with HEP.   2. Patient demonstrates independence with Postural Awareness.   3. Patient demonstrates independence with body mechanics.   4. Pt will improve cervical ext and rotation ROM to limited less than 25% to help improve his mobility.     Long Term GOALS: 12 weeks. Pt agrees with goals set.  1. Patient will be able to perform continuous exercise of his cervical mm and shoulders for 45 mins or more to improve tolerance to functional activities.   2. Patient demonstrates increased strength BUE's to 5/5 or greater to improve tolerance to functional activities.   3. Patient demonstrates improved overall function per FOTO  Neck Survey to 40% or less.     FOTO Neck Survey visit #5: 50% limited     Plan     Certification from 10/23/18-1/23/19    Continue outpatient physical therapy 2  times weekly to include: pt ed, hep, therapeutic exercises, neuromuscular re-education/ balance exercises, joint mobilizations, and modalities prn. Cont PT for  12 weeks. Pt may be seen by PTA as part of the rehabilitation team.     Therapist: Kay Cadena PTA

## 2018-11-27 ENCOUNTER — CLINICAL SUPPORT (OUTPATIENT)
Dept: REHABILITATION | Facility: HOSPITAL | Age: 68
End: 2018-11-27
Payer: MEDICARE

## 2018-11-27 DIAGNOSIS — M62.81 MUSCLE WEAKNESS: ICD-10-CM

## 2018-11-27 DIAGNOSIS — R29.898 DECREASED ROM OF NECK: ICD-10-CM

## 2018-11-27 DIAGNOSIS — M54.2 NECK PAIN: ICD-10-CM

## 2018-11-27 PROCEDURE — 97110 THERAPEUTIC EXERCISES: CPT | Mod: PN

## 2018-11-27 PROCEDURE — 97140 MANUAL THERAPY 1/> REGIONS: CPT | Mod: PN

## 2018-11-27 NOTE — PROGRESS NOTES
"  Physical Therapy Treatment Note     Name: Sherif Knutson Jr.  Clinic Number: [id number]    Diagnosis:   Encounter Diagnoses   Name Primary?    Neck pain     Muscle weakness     Decreased ROM of neck      Physician: Moises Harding, *    Visit # 7 of 12 authorized     Time In: 1030  Time Out: 1130  Total time: 60 mins (1:1 with PT for 30 of treatment session )    Subjective     Patient states: that he continues to feel just sore; just like he's used to. He said that since he has begun therapy, he has not has as much numbness and tingling into his hands.   Pain Scale: Sherif rates pain on a scale of 0-10 to be  0 currently.      Objective     Sherif received 50 minutes of therapeutic exercise including:    UBE 3/3  Levator str 30" x 2  UT str 30" x 2   Seated chin tuck 3" hold  2x10  +Chin tuck with rotation 3" hold 2x10 ea   +ulnar nerve glides x10 ea  Median nerve glides x10 ea   Corner stretch 3 x 20''   SA overhead reaching with lift off 3x10  SA overhead reaching 2x10  Standing alt flexion / abduction against wall with 1/2 foam roll x 10   Standing horizontal abduction with 1/2 foal roll against wall x 20   rows GTB 3x30  BUE Extension Green TB 3 x 10   Shoulder IR green TB 3x10  shoulder ER Red TB 3x10  supine punches 4# 3x10   Supine shoulder flexion with 4# dowel 2 x 10       NP   Standing chin tucks with O float 20 x 5'' hold  CROM Rotations with O float x 20   Towel snag for rotation 5" x5 ea side   Cervical ext with towel 2x10   SA wall slides 2x12      Sherif received 10 minutes of manual therapy including:  Manual cervical traction  STM cervcial psp  C spine side glides L>R C4-5, 5-6,6-7    Written Home Exercises Provided:   Sherif demo good understanding of the education provided. Patient demo good return demo of skill of exercises.    Assessment     Patient reports crepitus with ROM exercises but no pain associated with it. He required moderate cues for proper form, proper mm activation, and for " posture. He has ongoing stiffness in her cervical spine that is worse on the L side.  He was given an updated HEP today with ulnar and median nerve glides.   Pt prognosis is Good.  Pt will benefit from skilled outpatient physical therapy to address the above stated deficits, provide pt/family education and to maximize pt's level of independence.     Goals as Follows:  Short Term GOALS: 4 weeks. Pt agrees with goals set.  1. Patient demonstrates independence with HEP.   2. Patient demonstrates independence with Postural Awareness.   3. Patient demonstrates independence with body mechanics.   4. Pt will improve cervical ext and rotation ROM to limited less than 25% to help improve his mobility.     Long Term GOALS: 12 weeks. Pt agrees with goals set.  1. Patient will be able to perform continuous exercise of his cervical mm and shoulders for 45 mins or more to improve tolerance to functional activities.   2. Patient demonstrates increased strength BUE's to 5/5 or greater to improve tolerance to functional activities.   3. Patient demonstrates improved overall function per FOTO  Neck Survey to 40% or less.     FOTO Neck Survey visit #5: 50% limited     Plan     Certification from 10/23/18-1/23/19    Continue outpatient physical therapy 2  times weekly to include: pt ed, hep, therapeutic exercises, neuromuscular re-education/ balance exercises, joint mobilizations, and modalities prn. Cont PT for  12 weeks. Pt may be seen by PTA as part of the rehabilitation team.     Therapist: Nickie Turk, PT

## 2019-01-15 ENCOUNTER — OFFICE VISIT (OUTPATIENT)
Dept: NEUROSURGERY | Facility: CLINIC | Age: 69
End: 2019-01-15
Payer: MEDICARE

## 2019-01-15 VITALS
DIASTOLIC BLOOD PRESSURE: 88 MMHG | SYSTOLIC BLOOD PRESSURE: 146 MMHG | BODY MASS INDEX: 28.71 KG/M2 | HEIGHT: 72 IN | TEMPERATURE: 98 F | WEIGHT: 212 LBS | HEART RATE: 79 BPM

## 2019-01-15 DIAGNOSIS — M47.22 CERVICAL SPONDYLOSIS WITH RADICULOPATHY: Primary | ICD-10-CM

## 2019-01-15 PROCEDURE — 99999 PR PBB SHADOW E&M-EST. PATIENT-LVL V: ICD-10-PCS | Mod: PBBFAC,,, | Performed by: PHYSICIAN ASSISTANT

## 2019-01-15 PROCEDURE — 1101F PT FALLS ASSESS-DOCD LE1/YR: CPT | Mod: CPTII,S$GLB,, | Performed by: PHYSICIAN ASSISTANT

## 2019-01-15 PROCEDURE — 3077F PR MOST RECENT SYSTOLIC BLOOD PRESSURE >= 140 MM HG: ICD-10-PCS | Mod: CPTII,S$GLB,, | Performed by: PHYSICIAN ASSISTANT

## 2019-01-15 PROCEDURE — 3079F DIAST BP 80-89 MM HG: CPT | Mod: CPTII,S$GLB,, | Performed by: PHYSICIAN ASSISTANT

## 2019-01-15 PROCEDURE — 99214 OFFICE O/P EST MOD 30 MIN: CPT | Mod: S$GLB,,, | Performed by: PHYSICIAN ASSISTANT

## 2019-01-15 PROCEDURE — 99999 PR PBB SHADOW E&M-EST. PATIENT-LVL V: CPT | Mod: PBBFAC,,, | Performed by: PHYSICIAN ASSISTANT

## 2019-01-15 PROCEDURE — 99214 PR OFFICE/OUTPT VISIT, EST, LEVL IV, 30-39 MIN: ICD-10-PCS | Mod: S$GLB,,, | Performed by: PHYSICIAN ASSISTANT

## 2019-01-15 PROCEDURE — 3079F PR MOST RECENT DIASTOLIC BLOOD PRESSURE 80-89 MM HG: ICD-10-PCS | Mod: CPTII,S$GLB,, | Performed by: PHYSICIAN ASSISTANT

## 2019-01-15 PROCEDURE — 1101F PR PT FALLS ASSESS DOC 0-1 FALLS W/OUT INJ PAST YR: ICD-10-PCS | Mod: CPTII,S$GLB,, | Performed by: PHYSICIAN ASSISTANT

## 2019-01-15 PROCEDURE — 3077F SYST BP >= 140 MM HG: CPT | Mod: CPTII,S$GLB,, | Performed by: PHYSICIAN ASSISTANT

## 2019-01-16 NOTE — PROGRESS NOTES
HISTORY OF PRESENT ILLNESS:  Mr. Knutson is a 68-year-old male with history of   cervical radiculopathy.  He has been followed by Dr. Love and myself in the past.    The patient has EMG evidence of bilateral median neuropathies and chronic left   C5-C6 and left L5-S1 radiculopathy.  At the time of the last visit, the patient   is neurologically stable without focal weakness or myelopathy.  I discussed   with PT and injections.  He wanted to try physical therapy first.  He has   completed this.  He has been on Lyrica 100 mg twice a day.  The patient reports   minimal improvement with physical therapy.  He reports similar pain and   paresthesias down the left upper extremity that is constant.  He denies any new   or worsening weakness.  He denies balance difficulty.  He denies bowel or   bladder dysfunction.    PHYSICAL EXAMINATION:  GENERAL:  The patient is awake, alert, oriented, in no apparent distress.  NEUROLOGIC:  Cranial nerves II-XII are grossly intact.  Strength in his upper   extremities is 5/5 bilaterally in deltoids, biceps, triceps, wrist flexion,   wrist extension, hand  and hand intrinsics.  Lower extremity strength is   5/5 bilaterally in hip flexion, knee flexion, knee extension, dorsiflexion,   plantarflexion and EHL.  He has negative Katie sign.  Negative ankle clonus.    Toes are downgoing.  His gait is normal.  He has no difficulty with tandem   gait.    ASSESSMENT AND PLAN:  Mr. Knutson is a 68-year-old male with foraminal stenosis,   most significant at C4-C5 on the left, C5-C6 and C6-C7 bilaterally.  The patient   is neurologically intact without any focal deficits or signs or symptoms of   myelopathy.  He has gotten minimal relief with physical therapy.  Continue   Lyrica.  We will get him set up to see Dr. Kate, again he is an established for   bilateral transforaminal cervical epidural steroid injections at C4-C5 and   C5-C6.  Again, he has an EMG evidence of C5-C6 radiculopathy.  We will  see him   back after this is done, like him to wear carpal tunnel braces.  We can get an   injection of this in the future, but his symptoms appear to be more radicular at   this point in time and we will see him back in the injections are done.  He is   encouraged to call the clinic with questions or concerns in the meantime.    DICTATED BY:  SUJIT Clark  dd: 01/15/2019 10:57:47 (CST)  td: 01/15/2019 23:27:28 (CST)  Doc ID   #[id number]  Job ID #[id number]    CC:

## 2019-03-05 RX ORDER — CYCLOBENZAPRINE HCL 5 MG
5 TABLET ORAL 3 TIMES DAILY PRN
Qty: 60 TABLET | Refills: 0 | OUTPATIENT
Start: 2019-03-05

## 2019-07-26 DIAGNOSIS — I20.9 ANGINA, CLASS III: ICD-10-CM

## 2019-07-26 RX ORDER — CLOPIDOGREL BISULFATE 75 MG/1
75 TABLET ORAL DAILY
Qty: 30 TABLET | Refills: 11 | Status: SHIPPED | OUTPATIENT
Start: 2019-07-26 | End: 2020-07-29

## 2019-09-05 ENCOUNTER — LAB VISIT (OUTPATIENT)
Dept: LAB | Facility: HOSPITAL | Age: 69
End: 2019-09-05
Attending: INTERNAL MEDICINE
Payer: MEDICARE

## 2019-09-05 ENCOUNTER — OFFICE VISIT (OUTPATIENT)
Dept: CARDIOLOGY | Facility: CLINIC | Age: 69
End: 2019-09-05
Payer: MEDICARE

## 2019-09-05 VITALS
BODY MASS INDEX: 30.42 KG/M2 | WEIGHT: 224.63 LBS | DIASTOLIC BLOOD PRESSURE: 63 MMHG | SYSTOLIC BLOOD PRESSURE: 138 MMHG | HEIGHT: 72 IN | OXYGEN SATURATION: 95 % | HEART RATE: 84 BPM

## 2019-09-05 DIAGNOSIS — G47.33 OSA (OBSTRUCTIVE SLEEP APNEA): ICD-10-CM

## 2019-09-05 DIAGNOSIS — E11.51 TYPE 2 DIABETES MELLITUS WITH DIABETIC PERIPHERAL ANGIOPATHY WITHOUT GANGRENE, WITH LONG-TERM CURRENT USE OF INSULIN: ICD-10-CM

## 2019-09-05 DIAGNOSIS — Z79.4 TYPE 2 DIABETES MELLITUS WITH DIABETIC PERIPHERAL ANGIOPATHY WITHOUT GANGRENE, WITH LONG-TERM CURRENT USE OF INSULIN: ICD-10-CM

## 2019-09-05 DIAGNOSIS — E78.5 DYSLIPIDEMIA: ICD-10-CM

## 2019-09-05 DIAGNOSIS — R06.02 SHORTNESS OF BREATH: ICD-10-CM

## 2019-09-05 DIAGNOSIS — R06.09 DYSPNEA ON EXERTION: ICD-10-CM

## 2019-09-05 DIAGNOSIS — Z95.820 S/P PERIPHERAL ARTERY ANGIOPLASTY WITH STENT PLACEMENT: ICD-10-CM

## 2019-09-05 DIAGNOSIS — I25.10 CORONARY ARTERY DISEASE INVOLVING NATIVE CORONARY ARTERY OF NATIVE HEART WITHOUT ANGINA PECTORIS: Primary | ICD-10-CM

## 2019-09-05 DIAGNOSIS — Z95.1 S/P CABG X 5: ICD-10-CM

## 2019-09-05 DIAGNOSIS — Z98.61 POST PTCA: Chronic | ICD-10-CM

## 2019-09-05 DIAGNOSIS — R17 ELEVATED BILIRUBIN: ICD-10-CM

## 2019-09-05 DIAGNOSIS — I95.1 ORTHOSTATIC HYPOTENSION: ICD-10-CM

## 2019-09-05 LAB
ALBUMIN SERPL BCP-MCNC: 3.8 G/DL (ref 3.5–5.2)
ALP SERPL-CCNC: 93 U/L (ref 55–135)
ALT SERPL W/O P-5'-P-CCNC: 34 U/L (ref 10–44)
ANION GAP SERPL CALC-SCNC: 5 MMOL/L (ref 8–16)
AST SERPL-CCNC: 25 U/L (ref 10–40)
BASOPHILS # BLD AUTO: 0.03 K/UL (ref 0–0.2)
BASOPHILS NFR BLD: 0.4 % (ref 0–1.9)
BILIRUB SERPL-MCNC: 1.4 MG/DL (ref 0.1–1)
BUN SERPL-MCNC: 21 MG/DL (ref 8–23)
CALCIUM SERPL-MCNC: 9.2 MG/DL (ref 8.7–10.5)
CHLORIDE SERPL-SCNC: 106 MMOL/L (ref 95–110)
CO2 SERPL-SCNC: 30 MMOL/L (ref 23–29)
CREAT SERPL-MCNC: 1.8 MG/DL (ref 0.5–1.4)
DIFFERENTIAL METHOD: NORMAL
EOSINOPHIL # BLD AUTO: 0.2 K/UL (ref 0–0.5)
EOSINOPHIL NFR BLD: 2.3 % (ref 0–8)
ERYTHROCYTE [DISTWIDTH] IN BLOOD BY AUTOMATED COUNT: 12.4 % (ref 11.5–14.5)
EST. GFR  (AFRICAN AMERICAN): 44 ML/MIN/1.73 M^2
EST. GFR  (NON AFRICAN AMERICAN): 38 ML/MIN/1.73 M^2
GLUCOSE SERPL-MCNC: 115 MG/DL (ref 70–110)
HCT VFR BLD AUTO: 45.8 % (ref 40–54)
HGB BLD-MCNC: 15.7 G/DL (ref 14–18)
LYMPHOCYTES # BLD AUTO: 1.5 K/UL (ref 1–4.8)
LYMPHOCYTES NFR BLD: 20.3 % (ref 18–48)
MCH RBC QN AUTO: 30.1 PG (ref 27–31)
MCHC RBC AUTO-ENTMCNC: 34.3 G/DL (ref 32–36)
MCV RBC AUTO: 88 FL (ref 82–98)
MONOCYTES # BLD AUTO: 0.6 K/UL (ref 0.3–1)
MONOCYTES NFR BLD: 8.1 % (ref 4–15)
NEUTROPHILS # BLD AUTO: 5.2 K/UL (ref 1.8–7.7)
NEUTROPHILS NFR BLD: 68.9 % (ref 38–73)
PLATELET # BLD AUTO: 150 K/UL (ref 150–350)
PMV BLD AUTO: 10.1 FL (ref 9.2–12.9)
POTASSIUM SERPL-SCNC: 4.4 MMOL/L (ref 3.5–5.1)
PROT SERPL-MCNC: 7.1 G/DL (ref 6–8.4)
RBC # BLD AUTO: 5.22 M/UL (ref 4.6–6.2)
SODIUM SERPL-SCNC: 141 MMOL/L (ref 136–145)
TSH SERPL DL<=0.005 MIU/L-ACNC: 0.98 UIU/ML (ref 0.4–4)
WBC # BLD AUTO: 7.54 K/UL (ref 3.9–12.7)

## 2019-09-05 PROCEDURE — 99999 PR PBB SHADOW E&M-EST. PATIENT-LVL IV: ICD-10-PCS | Mod: PBBFAC,,, | Performed by: INTERNAL MEDICINE

## 2019-09-05 PROCEDURE — 99999 PR PBB SHADOW E&M-EST. PATIENT-LVL IV: CPT | Mod: PBBFAC,,, | Performed by: INTERNAL MEDICINE

## 2019-09-05 PROCEDURE — 80053 COMPREHEN METABOLIC PANEL: CPT

## 2019-09-05 PROCEDURE — 99214 PR OFFICE/OUTPT VISIT, EST, LEVL IV, 30-39 MIN: ICD-10-PCS | Mod: S$GLB,,, | Performed by: INTERNAL MEDICINE

## 2019-09-05 PROCEDURE — 85025 COMPLETE CBC W/AUTO DIFF WBC: CPT

## 2019-09-05 PROCEDURE — 3078F PR MOST RECENT DIASTOLIC BLOOD PRESSURE < 80 MM HG: ICD-10-PCS | Mod: CPTII,S$GLB,, | Performed by: INTERNAL MEDICINE

## 2019-09-05 PROCEDURE — 1101F PR PT FALLS ASSESS DOC 0-1 FALLS W/OUT INJ PAST YR: ICD-10-PCS | Mod: CPTII,S$GLB,, | Performed by: INTERNAL MEDICINE

## 2019-09-05 PROCEDURE — 3078F DIAST BP <80 MM HG: CPT | Mod: CPTII,S$GLB,, | Performed by: INTERNAL MEDICINE

## 2019-09-05 PROCEDURE — 84443 ASSAY THYROID STIM HORMONE: CPT

## 2019-09-05 PROCEDURE — 1101F PT FALLS ASSESS-DOCD LE1/YR: CPT | Mod: CPTII,S$GLB,, | Performed by: INTERNAL MEDICINE

## 2019-09-05 PROCEDURE — 99214 OFFICE O/P EST MOD 30 MIN: CPT | Mod: S$GLB,,, | Performed by: INTERNAL MEDICINE

## 2019-09-05 PROCEDURE — 36415 COLL VENOUS BLD VENIPUNCTURE: CPT

## 2019-09-05 PROCEDURE — 3075F PR MOST RECENT SYSTOLIC BLOOD PRESS GE 130-139MM HG: ICD-10-PCS | Mod: CPTII,S$GLB,, | Performed by: INTERNAL MEDICINE

## 2019-09-05 PROCEDURE — 3075F SYST BP GE 130 - 139MM HG: CPT | Mod: CPTII,S$GLB,, | Performed by: INTERNAL MEDICINE

## 2019-09-05 NOTE — PROGRESS NOTES
Subjective:   Patient ID:  Sherif Knutson Jr. is a 68 y.o. male who presents for follow-up of Shortness of Breath and Edema      HPI:   The patient presents for further evaluation of SOB. Sherif Knutson Jr. has known coronary artery disease having had had CABG and PCI with all grafts occluded. Cath over a year ago demonstrated adequate revascularization including patent left subclavian stent.  He has had more chronic GLEZ including orthopnea. States for the past 3 months the GLEZ has been worse. Feels he has to take a deep breath while siting at times. Can sleep on his right side , not his left and usually in a recliner. Last echo with normal systolic and diastolic function. Sherif Knutson Jr. denies chest pain,  palpitations, presyncope , or syncope. Orthostatic dizziness not as much a problem. Sherif Knutson Jr. has dyslipidemia  on high intensity statin stating his LDL was 50 last check at his PCP. Walks as part of his job but Sherif Knutson Jr. is not exercising.  Review of Systems   Constitution: Positive for weight gain. Negative for malaise/fatigue and weight loss.   Eyes: Negative for blurred vision.   Cardiovascular: Positive for dyspnea on exertion. Negative for chest pain, claudication, cyanosis, irregular heartbeat, leg swelling, near-syncope, orthopnea, palpitations, paroxysmal nocturnal dyspnea and syncope.   Respiratory: Positive for shortness of breath. Negative for cough and wheezing.         EMELI intolerant of CPAP   Musculoskeletal: Negative for falls and myalgias.   Gastrointestinal: Positive for nausea and vomiting. Negative for abdominal pain and heartburn.   Genitourinary: Negative for nocturia.   Neurological: Negative for brief paralysis, dizziness, focal weakness, headaches, numbness, paresthesias and weakness.   Psychiatric/Behavioral: Negative for altered mental status.       Current Outpatient Medications   Medication Sig    aspirin 325 MG tablet Take 325 mg by mouth once daily.     azelastine (ASTELIN)  "137 mcg (0.1 %) nasal spray Two sprays each nostril twice daily, sniff full until absorbed, then follow with fluticasone nasal spray    BD ULTRA-FINE JREMAINE PEN NEEDLES 32 gauge x 5/32" Ndle     clopidogrel (PLAVIX) 75 mg tablet Take 1 tablet (75 mg total) by mouth once daily.    CYANOCOBALAMIN, VITAMIN B-12, (VITAMIN B-12 ORAL) Take by mouth.    EASY TOUCH 31 X 5/16 " Ndle     fluticasone (FLONASE) 50 mcg/actuation nasal spray Two sprays each nostril twice daily, use after spraying with nasal last in 1st    liraglutide 0.6 mg/0.1 mL, 18 mg/3 mL, subq PNIJ (VICTOZA 3-ANDRE) 0.6 mg/0.1 mL (18 mg/3 mL) PnIj Inject 1.8 mg into the skin once daily.    LYRICA 100 mg capsule 2 (two) times daily.     metoprolol tartrate (LOPRESSOR) 50 MG tablet TAKE 1 TABLET BY MOUTH TWICE A DAY    montelukast (SINGULAIR) 10 mg tablet     NOVOLOG 100 unit/mL injection     NOVOLOG FLEXPEN 100 unit/mL InPn pen as needed.     oxycodone-acetaminophen (PERCOCET)  mg per tablet Take 1 tablet by mouth every 6 (six) hours as needed for Pain.    ranitidine (ZANTAC) 300 MG tablet 300 mg 2 (two) times daily.     rosuvastatin (CRESTOR) 40 MG Tab Take 1 tablet (40 mg total) by mouth every evening.    sub-q insulin device, 20 unit (VGO 20) [place]ashay by Misc.(Non-Drug; Combo Route) route.    sub-q insulin device, 20 unit [place]ashay by Misc.(Non-Drug; Combo Route) route.    SURE COMFORT INSULIN SYRINGE 1/2 mL 31 x 5/16" Syrg     TRESIBA FLEXTOUCH U-200 200 unit/mL (3 mL) InPn     isosorbide mononitrate (IMDUR) 30 MG 24 hr tablet Take 1 tablet (30 mg total) by mouth once daily.    nitroGLYCERIN (NITROSTAT) 0.4 MG SL tablet Place 1 tablet (0.4 mg total) under the tongue every 5 (five) minutes as needed for Chest pain.     No current facility-administered medications for this visit.      Objective:   Physical Exam   Constitutional: He is oriented to person, place, and time. He appears well-developed. No distress.   /63 (BP Location: Left " arm, Patient Position: Sitting, BP Method: Large (Automatic))   Pulse 84   Ht 6' (1.829 m)   Wt 101.9 kg (224 lb 10.4 oz)   SpO2 95%   BMI 30.47 kg/m²    HENT:   Head: Normocephalic.   Right Ear: External ear normal.   Left Ear: External ear normal.   Eyes: Pupils are equal, round, and reactive to light. EOM are normal. No scleral icterus.   Neck: Neck supple. No JVD present. No thyromegaly present.   Cardiovascular: Normal rate, regular rhythm, normal heart sounds and intact distal pulses. PMI is not displaced. Exam reveals no gallop and no friction rub.   No murmur heard.  Heart tones distant   Pulmonary/Chest: Effort normal and breath sounds normal. No respiratory distress. He has no wheezes. He has no rales.   Abdominal: Soft. He exhibits no distension. There is no hepatosplenomegaly. There is no tenderness.   Musculoskeletal: He exhibits no edema or tenderness.   Gait normal   Neurological: He is alert and oriented to person, place, and time.   Skin: Skin is warm and dry. No rash noted.   Psychiatric: He has a normal mood and affect. His behavior is normal.       Lab Results   Component Value Date     (L) 04/03/2018    K 3.9 04/03/2018     04/03/2018    CO2 27 04/03/2018    BUN 19 04/03/2018    CREATININE 1.4 04/03/2018     (H) 04/03/2018    HGBA1C 8.6 (H) 07/31/2017    MG 2.2 07/31/2017    AST 26 07/31/2017    ALT 26 07/31/2017    ALBUMIN 3.3 (L) 07/31/2017    PROT 6.5 07/31/2017    BILITOT 1.0 07/31/2017    WBC 6.01 04/03/2018    HGB 14.5 04/03/2018    HCT 43.6 04/03/2018    MCV 91 04/03/2018     04/03/2018    TSH 1.354 10/20/2016    CHOL 115 (L) 10/20/2016    HDL 46 10/20/2016    LDLCALC 41.6 (L) 10/20/2016    TRIG 137 10/20/2016       Assessment:     1. Coronary artery disease involving native coronary artery of native heart without angina pectoris ; Adequate revascularization last cath with noflow restricting lesions   2. S/P CABG x 5 in 2000    3. S/P subclavian artery  angioplasty with stent placement in 8/2013 : Patent last cath   4. Post PTCA: RCA (2006); LCx (2013); OM3 (2017)    5. Shortness of breath : Uncertain origin   6. Dyslipidemia :  on high intensity statin with last LDL reported as 50   7. Orthostatic hypotension : not a major sx   8. EMELI (obstructive sleep apnea) : Untreated   9. Type 2 diabetes mellitus with diabetic peripheral angiopathy without gangrene, with long-term current use of insulin            Plan:     Sherif was seen today for shortness of breath and edema.    Diagnoses and all orders for this visit:    Coronary artery disease involving native coronary artery of native heart without angina pectoris    S/P CABG x 5 in 2000    S/P subclavian artery angioplasty with stent placement in 8/2013    Post PTCA: RCA (2006); LCx (2013); OM3 (2017)    Shortness of breath  -     CT Chest Without Contrast; Future; Expected date: 09/05/2019  -     Echo Color Flow Doppler? Yes; Bubble Contrast? No; Future  -     CBC auto differential; Future; Expected date: 09/05/2019  -     Comprehensive metabolic panel; Future; Expected date: 09/05/2019    Dyslipidemia  Continue current regimen    Orthostatic hypotension    EMELI (obstructive sleep apnea)    Type 2 diabetes mellitus with diabetic peripheral angiopathy without gangrene, with long-term current use of insulin  -     TSH; Future; Expected date: 09/05/2019

## 2019-09-05 NOTE — PATIENT INSTRUCTIONS
Continue current regimen  If repeat studies OK suggest Graded exercise for 30 minutes a day at least 5 days a week .

## 2019-09-16 ENCOUNTER — TELEPHONE (OUTPATIENT)
Dept: CARDIOLOGY | Facility: CLINIC | Age: 69
End: 2019-09-16

## 2019-09-16 ENCOUNTER — HOSPITAL ENCOUNTER (OUTPATIENT)
Dept: CARDIOLOGY | Facility: CLINIC | Age: 69
Discharge: HOME | End: 2019-09-16
Attending: INTERNAL MEDICINE
Payer: MEDICARE

## 2019-09-16 ENCOUNTER — HOSPITAL ENCOUNTER (OUTPATIENT)
Dept: RADIOLOGY | Facility: HOSPITAL | Age: 69
Discharge: HOME | End: 2019-09-16
Attending: INTERNAL MEDICINE
Payer: MEDICARE

## 2019-09-16 VITALS
BODY MASS INDEX: 29.12 KG/M2 | HEART RATE: 78 BPM | SYSTOLIC BLOOD PRESSURE: 110 MMHG | WEIGHT: 215 LBS | DIASTOLIC BLOOD PRESSURE: 70 MMHG | HEIGHT: 72 IN

## 2019-09-16 DIAGNOSIS — R06.02 SHORTNESS OF BREATH: ICD-10-CM

## 2019-09-16 DIAGNOSIS — R06.09 DYSPNEA ON EXERTION: ICD-10-CM

## 2019-09-16 LAB
ASCENDING AORTA: 3.49 CM
AV INDEX (PROSTH): 0.75
AV MEAN GRADIENT: 2 MMHG
AV PEAK GRADIENT: 4 MMHG
AV VALVE AREA: 3.45 CM2
AV VELOCITY RATIO: 0.75
BSA FOR ECHO PROCEDURE: 2.23 M2
CV ECHO LV RWT: 0.41 CM
DOP CALC AO PEAK VEL: 1 M/S
DOP CALC AO VTI: 18.4 CM
DOP CALC LVOT AREA: 4.6 CM2
DOP CALC LVOT DIAMETER: 2.42 CM
DOP CALC LVOT PEAK VEL: 0.75 M/S
DOP CALC LVOT STROKE VOLUME: 63.53 CM3
DOP CALCLVOT PEAK VEL VTI: 13.82 CM
E WAVE DECELERATION TIME: 199.72 MSEC
E/A RATIO: 1.22
E/E' RATIO: 8 M/S
ECHO LV POSTERIOR WALL: 0.88 CM (ref 0.6–1.1)
FRACTIONAL SHORTENING: 37 % (ref 28–44)
INTERVENTRICULAR SEPTUM: 1.14 CM (ref 0.6–1.1)
LA MAJOR: 4.59 CM
LA MINOR: 4.41 CM
LA WIDTH: 3.47 CM
LEFT ATRIUM SIZE: 4.34 CM
LEFT ATRIUM VOLUME INDEX: 26.2 ML/M2
LEFT ATRIUM VOLUME: 57.58 CM3
LEFT INTERNAL DIMENSION IN SYSTOLE: 2.74 CM (ref 2.1–4)
LEFT VENTRICLE DIASTOLIC VOLUME INDEX: 38.4 ML/M2
LEFT VENTRICLE DIASTOLIC VOLUME: 84.35 ML
LEFT VENTRICLE MASS INDEX: 66 G/M2
LEFT VENTRICLE SYSTOLIC VOLUME INDEX: 12.8 ML/M2
LEFT VENTRICLE SYSTOLIC VOLUME: 28.06 ML
LEFT VENTRICULAR INTERNAL DIMENSION IN DIASTOLE: 4.33 CM (ref 3.5–6)
LEFT VENTRICULAR MASS: 146.09 G
LV LATERAL E/E' RATIO: 6.67 M/S
LV SEPTAL E/E' RATIO: 10 M/S
MV PEAK A VEL: 0.49 M/S
MV PEAK E VEL: 0.6 M/S
PISA TR MAX VEL: 1.93 M/S
PULM VEIN S/D RATIO: 1.28
PV PEAK D VEL: 0.39 M/S
PV PEAK S VEL: 0.5 M/S
RA MAJOR: 4.01 CM
RA WIDTH: 3.05 CM
RIGHT VENTRICULAR END-DIASTOLIC DIMENSION: 3.41 CM
RV TISSUE DOPPLER FREE WALL SYSTOLIC VELOCITY 1 (APICAL 4 CHAMBER VIEW): 6.47 CM/S
SINUS: 3.29 CM
STJ: 3.29 CM
TDI LATERAL: 0.09 M/S
TDI SEPTAL: 0.06 M/S
TDI: 0.08 M/S
TR MAX PG: 15 MMHG
TRICUSPID ANNULAR PLANE SYSTOLIC EXCURSION: 1.16 CM

## 2019-09-16 PROCEDURE — 93306 ECHO (CUPID ONLY): ICD-10-PCS | Mod: S$GLB,,, | Performed by: INTERNAL MEDICINE

## 2019-09-16 PROCEDURE — 71250 CT THORAX DX C-: CPT | Mod: TC

## 2019-09-16 PROCEDURE — 71250 CT CHEST WITHOUT CONTRAST: ICD-10-PCS | Mod: 26,,, | Performed by: RADIOLOGY

## 2019-09-16 PROCEDURE — 93306 TTE W/DOPPLER COMPLETE: CPT | Mod: S$GLB,,, | Performed by: INTERNAL MEDICINE

## 2019-09-16 PROCEDURE — 71250 CT THORAX DX C-: CPT | Mod: 26,,, | Performed by: RADIOLOGY

## 2019-09-30 RX ORDER — METOPROLOL TARTRATE 50 MG/1
TABLET ORAL
Qty: 60 TABLET | Refills: 6 | Status: SHIPPED | OUTPATIENT
Start: 2019-09-30 | End: 2020-11-17

## 2020-02-28 ENCOUNTER — TELEPHONE (OUTPATIENT)
Dept: CARDIOLOGY | Facility: CLINIC | Age: 70
End: 2020-02-28

## 2020-02-28 NOTE — TELEPHONE ENCOUNTER
----- Message from Kodi Allen MD sent at 2/27/2020  6:00 PM CST -----  Contact: patient call  OK to stop Plavix for 5 days pre procedure but would suggest continuing low dose ( 81 mg ) aspirin joel procedure then return to the 325 mg dose.  ----- Message -----  From: Oksana Rodriguez MA  Sent: 2/27/2020  12:11 PM CST  To: Kodi Allen MD        ----- Message -----  From: Polina Lyons MA  Sent: 2/27/2020  10:18 AM CST  To: ANTOINETTE Bean Dr last seen him.  ----- Message -----  From: Constance Torrez MA  Sent: 2/27/2020  10:10 AM CST  To: Ricky Kohler Staff    The patient need to talk  to you about his back procedure that schedule on 2- and his medication Plavix please call [phone]. Thank you.

## 2020-06-30 ENCOUNTER — OFFICE VISIT (OUTPATIENT)
Dept: CARDIOLOGY | Facility: CLINIC | Age: 70
End: 2020-06-30
Payer: MEDICARE

## 2020-06-30 VITALS
HEART RATE: 65 BPM | HEIGHT: 72 IN | BODY MASS INDEX: 30.22 KG/M2 | SYSTOLIC BLOOD PRESSURE: 132 MMHG | WEIGHT: 223.13 LBS | DIASTOLIC BLOOD PRESSURE: 69 MMHG | OXYGEN SATURATION: 95 %

## 2020-06-30 DIAGNOSIS — I25.10 CORONARY ARTERY DISEASE INVOLVING NATIVE CORONARY ARTERY OF NATIVE HEART WITHOUT ANGINA PECTORIS: Primary | ICD-10-CM

## 2020-06-30 DIAGNOSIS — R06.02 SHORTNESS OF BREATH: ICD-10-CM

## 2020-06-30 DIAGNOSIS — I95.1 ORTHOSTATIC HYPOTENSION: ICD-10-CM

## 2020-06-30 DIAGNOSIS — G47.33 OSA (OBSTRUCTIVE SLEEP APNEA): ICD-10-CM

## 2020-06-30 DIAGNOSIS — Z79.4 TYPE 2 DIABETES MELLITUS WITH DIABETIC PERIPHERAL ANGIOPATHY WITHOUT GANGRENE, WITH LONG-TERM CURRENT USE OF INSULIN: ICD-10-CM

## 2020-06-30 DIAGNOSIS — Z98.61 POST PTCA: ICD-10-CM

## 2020-06-30 DIAGNOSIS — E78.5 DYSLIPIDEMIA: ICD-10-CM

## 2020-06-30 DIAGNOSIS — Z95.820 S/P PERIPHERAL ARTERY ANGIOPLASTY WITH STENT PLACEMENT: ICD-10-CM

## 2020-06-30 DIAGNOSIS — Z91.89 SEDENTARY LIFESTYLE: ICD-10-CM

## 2020-06-30 DIAGNOSIS — Z95.1 S/P CABG X 5: ICD-10-CM

## 2020-06-30 DIAGNOSIS — I10 ESSENTIAL HYPERTENSION: ICD-10-CM

## 2020-06-30 DIAGNOSIS — E11.51 TYPE 2 DIABETES MELLITUS WITH DIABETIC PERIPHERAL ANGIOPATHY WITHOUT GANGRENE, WITH LONG-TERM CURRENT USE OF INSULIN: ICD-10-CM

## 2020-06-30 PROCEDURE — 99999 PR PBB SHADOW E&M-EST. PATIENT-LVL V: CPT | Mod: PBBFAC,,, | Performed by: NURSE PRACTITIONER

## 2020-06-30 PROCEDURE — 99214 OFFICE O/P EST MOD 30 MIN: CPT | Mod: S$GLB,,, | Performed by: NURSE PRACTITIONER

## 2020-06-30 PROCEDURE — 3075F PR MOST RECENT SYSTOLIC BLOOD PRESS GE 130-139MM HG: ICD-10-PCS | Mod: CPTII,S$GLB,, | Performed by: NURSE PRACTITIONER

## 2020-06-30 PROCEDURE — 3008F BODY MASS INDEX DOCD: CPT | Mod: CPTII,S$GLB,, | Performed by: NURSE PRACTITIONER

## 2020-06-30 PROCEDURE — 1159F PR MEDICATION LIST DOCUMENTED IN MEDICAL RECORD: ICD-10-PCS | Mod: S$GLB,,, | Performed by: NURSE PRACTITIONER

## 2020-06-30 PROCEDURE — 99214 PR OFFICE/OUTPT VISIT, EST, LEVL IV, 30-39 MIN: ICD-10-PCS | Mod: S$GLB,,, | Performed by: NURSE PRACTITIONER

## 2020-06-30 PROCEDURE — 1126F PR PAIN SEVERITY QUANTIFIED, NO PAIN PRESENT: ICD-10-PCS | Mod: S$GLB,,, | Performed by: NURSE PRACTITIONER

## 2020-06-30 PROCEDURE — 1159F MED LIST DOCD IN RCRD: CPT | Mod: S$GLB,,, | Performed by: NURSE PRACTITIONER

## 2020-06-30 PROCEDURE — 1126F AMNT PAIN NOTED NONE PRSNT: CPT | Mod: S$GLB,,, | Performed by: NURSE PRACTITIONER

## 2020-06-30 PROCEDURE — 99999 PR PBB SHADOW E&M-EST. PATIENT-LVL V: ICD-10-PCS | Mod: PBBFAC,,, | Performed by: NURSE PRACTITIONER

## 2020-06-30 PROCEDURE — 3075F SYST BP GE 130 - 139MM HG: CPT | Mod: CPTII,S$GLB,, | Performed by: NURSE PRACTITIONER

## 2020-06-30 PROCEDURE — 3078F PR MOST RECENT DIASTOLIC BLOOD PRESSURE < 80 MM HG: ICD-10-PCS | Mod: CPTII,S$GLB,, | Performed by: NURSE PRACTITIONER

## 2020-06-30 PROCEDURE — 3078F DIAST BP <80 MM HG: CPT | Mod: CPTII,S$GLB,, | Performed by: NURSE PRACTITIONER

## 2020-06-30 PROCEDURE — 3008F PR BODY MASS INDEX (BMI) DOCUMENTED: ICD-10-PCS | Mod: CPTII,S$GLB,, | Performed by: NURSE PRACTITIONER

## 2020-06-30 RX ORDER — LOSARTAN POTASSIUM 50 MG/1
50 TABLET, FILM COATED ORAL DAILY
COMMUNITY
Start: 2020-04-06

## 2020-06-30 RX ORDER — INSULIN LISPRO 100 [IU]/ML
12 INJECTION, SOLUTION INTRAVENOUS; SUBCUTANEOUS 3 TIMES DAILY
Status: ON HOLD | COMMUNITY
Start: 2020-05-22 | End: 2023-11-08

## 2020-06-30 RX ORDER — PREGABALIN 300 MG/1
CAPSULE ORAL 2 TIMES DAILY
COMMUNITY
Start: 2020-04-06

## 2020-06-30 RX ORDER — INSULIN DEGLUDEC 200 U/ML
64 INJECTION, SOLUTION SUBCUTANEOUS DAILY
Status: ON HOLD | COMMUNITY
Start: 2020-06-18 | End: 2023-11-08

## 2020-06-30 NOTE — PROGRESS NOTES
Subjective:   Chief Complaint: Shortness of Breath (x 1 month)      Problem List:   CAD s/p CABGx5 in 2000  HLD  Orthostatic hypotension  DM2  EMELI intolerant of CPAP  2019-Mild, scattered calcific atherosclerosis  Subclavian artery stenosis with stent    History of Present Illness: Sherif Knutson Jr. is a 69 y.o.  male patient who presents for further evaluation of Shortness of Breath (x 1 month). Sherif Knutson Jr. has known coronary artery disease having had had CABG and PCI with all grafts occluded. Cath 2018 demonstrated adequate revascularization including patent left subclavian stent.  He is still having more chronic GLEZ at rest and with exertion including orthopnea and he now sleeps in a recliner.  Feels he has to take a deep breath while siting at times. Occasionally feels lightheaded at random times in the morning and afternoons when he sits up or stands with BP checked 148/76. He reports this lightheadedness occurs almost daily and is slow to come on lasting 5-10 minutes without any accompanying symptoms  He denies any symptoms of cough, peripheral edema, palpitations, presyncope, syncope or claudication.  He does feel a Sharp pain in left shoulder area at rest lasting one minute sometimes with SOB but no palpitations.  Left shoulder area pain has occurred 4-5 times in the last 6 months    Clinic BP/HR: 132/69 HR 65  Home BP readings: similar  Home weights: reports weight loss by eating less CHO  exercise regimen: limited due to back/neck pains but plans to begin using a stationary bike   Labs done 6months ago at [facility]; 2016 LDL 41  Renal function-2017-Cr 1.8, needs updated results  DM not controlled-her reports last  A1C 8.6 at PCP about 6 months ago  On DAPT without any bleeding episodes  Adherent with medication    9/2019 Previous ECHO: LVEF 55%   9/2019 CT chest-unremarkable    Social History:  Sherif reports that he has never smoked. He has never used smokeless tobacco. He reports that he does not  drink alcohol or use drugs.      The ASCVD Risk score (Russel JULIANNE Jr., et al., 2013) failed to calculate for the following reasons:    Cannot find a previous HDL lab    Cannot find a previous total cholesterol lab     Past Medical History:   Diagnosis Date    CHF (congestive heart failure) 8/29/2017    Coronary artery disease 2006, 5/2013    s/p pci to rca (2006), LCx (5/2013)    DM (diabetes mellitus)     HLD (hyperlipidemia)     HTN (hypertension)     Kidney stone     EMELI (obstructive sleep apnea) 11/2/2015    Subclavian artery stenosis, left        Review of Systems   Constitution: Positive for malaise/fatigue. Negative for chills, decreased appetite, diaphoresis, fever and weight gain.        Denies change in activity level   HENT: Negative for nosebleeds and sore throat.    Eyes: Negative for visual disturbance.        No amaurosis fugax   Cardiovascular: Positive for chest pain, dyspnea on exertion, orthopnea and paroxysmal nocturnal dyspnea. Negative for claudication, cyanosis, irregular heartbeat, leg swelling, near-syncope, palpitations and syncope.        As per HPI above   Respiratory: Positive for sleep disturbances due to breathing and snoring. Negative for cough, hemoptysis, shortness of breath and wheezing.         EMELI intolerant of CPAP    Endocrine:        +DM   Hematologic/Lymphatic: Negative for bleeding problem.   Skin: Negative for color change, nail changes and rash.   Musculoskeletal: Positive for arthritis, back pain and neck pain. Negative for falls.        Denies muscle aches   Gastrointestinal: Negative for abdominal pain, constipation, diarrhea, hematemesis, hematochezia, melena, nausea and vomiting.   Genitourinary: Positive for nocturia (1-2 times a night). Negative for dysuria and hematuria.        No change in urinary output   Neurological: Positive for excessive daytime sleepiness and light-headedness. Negative for dizziness, headaches, loss of balance, tremors, vertigo and  "weakness.   Psychiatric/Behavioral: Negative for altered mental status. The patient has insomnia (melatonin 5mg). The patient is not nervous/anxious.    Allergic/Immunologic:        Drug allergies listed elsewhere if present          Medications:  Outpatient Encounter Medications as of 6/30/2020   Medication Sig Dispense Refill    aspirin 325 MG tablet Take 325 mg by mouth once daily.       azelastine (ASTELIN) 137 mcg (0.1 %) nasal spray Two sprays each nostril twice daily, sniff full until absorbed, then follow with fluticasone nasal spray 30 mL 11    BD ULTRA-FINE JERMAINE PEN NEEDLES 32 gauge x 5/32" Ndle       clopidogrel (PLAVIX) 75 mg tablet Take 1 tablet (75 mg total) by mouth once daily. 30 tablet 11    CYANOCOBALAMIN, VITAMIN B-12, (VITAMIN B-12 ORAL) Take by mouth.      EASY TOUCH 31 X 5/16 " Ndle       fluticasone (FLONASE) 50 mcg/actuation nasal spray Two sprays each nostril twice daily, use after spraying with nasal last in 1st 1 Bottle 11    isosorbide mononitrate (IMDUR) 30 MG 24 hr tablet Take 1 tablet (30 mg total) by mouth once daily. 30 tablet 11    liraglutide 0.6 mg/0.1 mL, 18 mg/3 mL, subq PNIJ (VICTOZA 3-ANDRE) 0.6 mg/0.1 mL (18 mg/3 mL) PnIj Inject 1.8 mg into the skin once daily.      LYRICA 100 mg capsule 2 (two) times daily.       metoprolol tartrate (LOPRESSOR) 50 MG tablet TAKE 1 TABLET BY MOUTH TWICE A DAY 60 tablet 6    montelukast (SINGULAIR) 10 mg tablet       nitroGLYCERIN (NITROSTAT) 0.4 MG SL tablet Place 1 tablet (0.4 mg total) under the tongue every 5 (five) minutes as needed for Chest pain. 20 tablet 0    NOVOLOG 100 unit/mL injection       NOVOLOG FLEXPEN 100 unit/mL InPn pen as needed.       oxycodone-acetaminophen (PERCOCET)  mg per tablet Take 1 tablet by mouth every 6 (six) hours as needed for Pain. 40 tablet 0    ranitidine (ZANTAC) 300 MG tablet 300 mg 2 (two) times daily.       rosuvastatin (CRESTOR) 40 MG Tab Take 1 tablet (40 mg total) by mouth " "every evening. 30 tablet 6    sub-q insulin device, 20 unit (VGO 20) Lashay by Misc.(Non-Drug; Combo Route) route.      sub-q insulin device, 20 unit Lashay by Misc.(Non-Drug; Combo Route) route.      SURE COMFORT INSULIN SYRINGE 1/2 mL 31 x 5/16" Syrg       TRESIBA FLEXTOUCH U-200 200 unit/mL (3 mL) InPn        No facility-administered encounter medications on file as of 6/30/2020.      Family History:  Sherif's family history includes Heart attack in his father and mother; Heart disease in his father, mother, and paternal uncle; Hypertension in his brother.    Objective:          /69 (BP Location: Left arm, Patient Position: Sitting, BP Method: X-Large (Automatic))   Pulse 65   Ht 6' (1.829 m)   Wt 101.2 kg (223 lb 1.7 oz)   SpO2 95%   BMI 30.26 kg/m²       Physical Exam  Vitals signs reviewed.   Constitutional:       General: He is not in acute distress.     Appearance: Normal appearance. He is well-developed. He is not ill-appearing, toxic-appearing or diaphoretic.   HENT:      Head: Normocephalic and atraumatic.   Eyes:      General: No scleral icterus.     Extraocular Movements: Extraocular movements intact.      Conjunctiva/sclera: Conjunctivae normal.      Pupils: Pupils are equal, round, and reactive to light.   Neck:      Musculoskeletal: Normal range of motion and neck supple. No muscular tenderness.      Thyroid: No thyromegaly.      Vascular: No carotid bruit or JVD.      Trachea: No tracheal deviation.   Cardiovascular:      Rate and Rhythm: Normal rate and regular rhythm.      Chest Wall: PMI is not displaced. No thrill.      Pulses:           Carotid pulses are 2+ on the right side and 2+ on the left side.       Radial pulses are 2+ on the right side and 2+ on the left side.      Heart sounds: Heart sounds are distant. No murmur. No friction rub. No gallop.    Pulmonary:      Effort: Pulmonary effort is normal. No respiratory distress.      Breath sounds: Normal breath sounds. No stridor. " No wheezing, rhonchi or rales.   Chest:      Chest wall: No tenderness.   Abdominal:      General: Bowel sounds are normal. There is no distension or abdominal bruit.      Palpations: Abdomen is soft. There is no mass.      Tenderness: There is no abdominal tenderness. There is no guarding.      Comments: Central obesity   Musculoskeletal: Normal range of motion.      Right lower leg: No edema.      Left lower leg: No edema.   Lymphadenopathy:      Cervical: No cervical adenopathy.   Skin:     General: Skin is warm and dry.      Coloration: Skin is not jaundiced or pale.      Findings: No erythema or rash.   Neurological:      Mental Status: He is alert and oriented to person, place, and time.   Psychiatric:         Mood and Affect: Mood normal.         Behavior: Behavior normal.         Thought Content: Thought content normal.         Judgment: Judgment normal.         Lab Results   Component Value Date     09/05/2019    K 4.4 09/05/2019     09/05/2019    CO2 30 (H) 09/05/2019    BUN 21 09/05/2019    CREATININE 1.8 (H) 09/05/2019     (H) 09/05/2019    HGBA1C 8.6 (H) 07/31/2017    MG 2.2 07/31/2017    AST 25 09/05/2019    ALT 34 09/05/2019    ALBUMIN 3.8 09/05/2019    PROT 7.1 09/05/2019    BILITOT 1.4 (H) 09/05/2019    BNP 22 07/30/2017    WBC 7.54 09/05/2019    HGB 15.7 09/05/2019    HCT 45.8 09/05/2019    MCV 88 09/05/2019     09/05/2019    INR 1.0 07/31/2017    TSH 0.979 09/05/2019    CHOL 115 (L) 10/20/2016    HDL 46 10/20/2016    LDLCALC 41.6 (L) 10/20/2016    TRIG 137 10/20/2016         Reviewed  I have reviewed the following in detail:  []  Stress test   [x]  Angiography   [x]  Echocardiogram   [x]  Labs   [x]  Other:  Old records           Assessment/Plan:     Sherif Knutson  was seen today for Shortness of Breath (x 1 month)     Diagnoses and all orders for this visit:     Coronary artery disease involving native coronary artery of native heart without angina pectoris--Adequate  revascularization last cath with noflow restricting lesions  -     Lipid Panel; Future; Expected date: 07/01/2020   -LDL at goal; maintain LDL <70  -Optimize BP control  --Should continue ASA, BB, and statins    S/P CABG x 5 in 2000-patent last cath 2017  -continue BB, ARB, statin  -ASA 325mg (adaptable study)    S/P subclavian artery angioplasty with stent placement in 8/2013  -continue DAPT       Post PTCA: RCA (2006); LCx (2013); OM3 (2017)    Shortness of breath-uncertain origin to include possible physical deconditioning  -     Holter monitor - 48 hour; Future    Dyslipidemia  -     Lipid Panel; Future; Expected date: 07/01/2020  Continue current regimen       Orthostatic hypotension-improved  Stay hydrated  Sit/stand up slowly to avoid sudden drop in blood pressure    EMELI (obstructive sleep apnea)-not treated  -Recommend CPAP     Type 2 diabetes mellitus with diabetic peripheral angiopathy without gangrene, with long-term current use of insulin-not controlled, A1C 8.6  -Have emphasized the necessity of good control  -HgbA1C goal <7.0  -Follow-up with PCP for continued DM management  -Recommend diet with low white carbohydrates       Essential hypertension-controlled  -     Comprehensive metabolic panel; Future; Expected date: 07/01/2020  -     CBC auto differential; Future; Expected date: 06/30/2020  -BP goal <130/80, recommended home BP monitoring and report BP consistently above goal  -Encouraged Low Na  -Regular exercise regimen at least 30minutes 5 days a week  -Not eligible for Digital HTN program    Sedentary lifestyle  - Graded exercise for 30 minutes a day for at least 5 days a week or a total of 150 minutes a week.        This patient was seen and discussed with MD Alejandro Waldrop APRN, FNP-BC   6/30/2020 11:08 AM    Unless there are intervening problems, patient should return for re-evaluation in Follow up in about 6 months (around 12/30/2020).    Follow-up:     Future Appointments   Date Time  Provider Department Center   7/1/2020 10:00 AM LAB, APPOINTMENT [facility]   7/1/2020  3:00 PM HOLTER, E[facility]

## 2020-06-30 NOTE — PATIENT INSTRUCTIONS
Stay hydrated    Sit/stand up slowly to avoid sudden drop in blood pressure    Lab test HGBA1C goal should be less than 7. Follow-up with your doctor as planned  Recommend diet with low white carbohydrates (limit white breads, pastas, rice) starchy vegetables like potatoes.      Other foods high in carbohydrates and can significantly raise blood sugar levels in diabetics:  Corn  Milk  Fruit other than berries  Juice, soda, punch, sweetened tea.  Beer  Desserts, baked goods, candy, ice cream         Graded exercise for 30 minutes a day for at least 5 days a week or a total of 150 minutes a week.  You can include walkng, stationary bike, or swim for 30 minutes .  Whatever exercise you choose, make sure you are working hard enough to increase your heart rate.

## 2020-07-01 ENCOUNTER — CLINICAL SUPPORT (OUTPATIENT)
Dept: CARDIOLOGY | Facility: HOSPITAL | Age: 70
End: 2020-07-01
Attending: NURSE PRACTITIONER
Payer: MEDICARE

## 2020-07-01 DIAGNOSIS — R06.02 SHORTNESS OF BREATH: ICD-10-CM

## 2020-07-01 DIAGNOSIS — E87.5 HYPERKALEMIA: Primary | ICD-10-CM

## 2020-07-01 PROCEDURE — 93226 XTRNL ECG REC<48 HR SCAN A/R: CPT

## 2020-07-01 PROCEDURE — 93227 XTRNL ECG REC<48 HR R&I: CPT | Mod: ,,, | Performed by: INTERNAL MEDICINE

## 2020-07-01 PROCEDURE — 93227 HOLTER MONITOR - 48 HOUR (CUPID ONLY): ICD-10-PCS | Mod: ,,, | Performed by: INTERNAL MEDICINE

## 2020-07-01 RX ORDER — NITROGLYCERIN 0.4 MG/1
TABLET SUBLINGUAL
Qty: 20 TABLET | Refills: 0 | Status: SHIPPED | OUTPATIENT
Start: 2020-07-01

## 2020-07-06 LAB
OHS CV EVENT MONITOR DAY: 0
OHS CV HOLTER LENGTH DECIMAL HOURS: 48
OHS CV HOLTER LENGTH HOURS: 48
OHS CV HOLTER LENGTH MINUTES: 0

## 2020-08-25 ENCOUNTER — TELEPHONE (OUTPATIENT)
Dept: CARDIOLOGY | Facility: CLINIC | Age: 70
End: 2020-08-25

## 2020-08-25 NOTE — TELEPHONE ENCOUNTER
Patient called stating that [facility] is requesting a statement regarding holding Plavix.  Patient reports he has been hold plavix for 5 days today for epidural injection scheduled on 8/26/2020. Patient approved to hold plavix for 5 days.      JAYESH Garcia-BC  Cardiology Consult

## 2021-01-28 ENCOUNTER — OFFICE VISIT (OUTPATIENT)
Dept: CARDIOLOGY | Facility: CLINIC | Age: 71
End: 2021-01-28
Payer: MEDICARE

## 2021-01-28 VITALS
HEIGHT: 72 IN | WEIGHT: 227.5 LBS | BODY MASS INDEX: 30.81 KG/M2 | DIASTOLIC BLOOD PRESSURE: 72 MMHG | HEART RATE: 70 BPM | SYSTOLIC BLOOD PRESSURE: 153 MMHG

## 2021-01-28 DIAGNOSIS — G47.33 OSA (OBSTRUCTIVE SLEEP APNEA): ICD-10-CM

## 2021-01-28 DIAGNOSIS — Z98.61 POST PTCA: Chronic | ICD-10-CM

## 2021-01-28 DIAGNOSIS — I95.1 ORTHOSTATIC HYPOTENSION: ICD-10-CM

## 2021-01-28 DIAGNOSIS — E78.5 DYSLIPIDEMIA: ICD-10-CM

## 2021-01-28 DIAGNOSIS — I25.10 CAD (CORONARY ARTERY DISEASE): ICD-10-CM

## 2021-01-28 DIAGNOSIS — Z79.4 TYPE 2 DIABETES MELLITUS WITH DIABETIC PERIPHERAL ANGIOPATHY WITHOUT GANGRENE, WITH LONG-TERM CURRENT USE OF INSULIN: ICD-10-CM

## 2021-01-28 DIAGNOSIS — Z95.820 S/P PERIPHERAL ARTERY ANGIOPLASTY WITH STENT PLACEMENT: ICD-10-CM

## 2021-01-28 DIAGNOSIS — I25.10 CORONARY ARTERY DISEASE INVOLVING NATIVE CORONARY ARTERY OF NATIVE HEART WITHOUT ANGINA PECTORIS: Primary | ICD-10-CM

## 2021-01-28 DIAGNOSIS — E11.51 TYPE 2 DIABETES MELLITUS WITH DIABETIC PERIPHERAL ANGIOPATHY WITHOUT GANGRENE, WITH LONG-TERM CURRENT USE OF INSULIN: ICD-10-CM

## 2021-01-28 DIAGNOSIS — Z95.1 S/P CABG X 5: ICD-10-CM

## 2021-01-28 DIAGNOSIS — Z91.89 SEDENTARY LIFESTYLE: ICD-10-CM

## 2021-01-28 DIAGNOSIS — I10 ESSENTIAL HYPERTENSION: ICD-10-CM

## 2021-01-28 PROCEDURE — 1159F PR MEDICATION LIST DOCUMENTED IN MEDICAL RECORD: ICD-10-PCS | Mod: S$GLB,,, | Performed by: PHYSICIAN ASSISTANT

## 2021-01-28 PROCEDURE — 3077F SYST BP >= 140 MM HG: CPT | Mod: CPTII,S$GLB,, | Performed by: PHYSICIAN ASSISTANT

## 2021-01-28 PROCEDURE — 1126F AMNT PAIN NOTED NONE PRSNT: CPT | Mod: S$GLB,,, | Performed by: PHYSICIAN ASSISTANT

## 2021-01-28 PROCEDURE — 3077F PR MOST RECENT SYSTOLIC BLOOD PRESSURE >= 140 MM HG: ICD-10-PCS | Mod: CPTII,S$GLB,, | Performed by: PHYSICIAN ASSISTANT

## 2021-01-28 PROCEDURE — 3052F PR MOST RECENT HEMOGLOBIN A1C LEVEL 8.0 - < 9.0%: ICD-10-PCS | Mod: CPTII,S$GLB,, | Performed by: PHYSICIAN ASSISTANT

## 2021-01-28 PROCEDURE — 1126F PR PAIN SEVERITY QUANTIFIED, NO PAIN PRESENT: ICD-10-PCS | Mod: S$GLB,,, | Performed by: PHYSICIAN ASSISTANT

## 2021-01-28 PROCEDURE — 3078F DIAST BP <80 MM HG: CPT | Mod: CPTII,S$GLB,, | Performed by: PHYSICIAN ASSISTANT

## 2021-01-28 PROCEDURE — 99214 OFFICE O/P EST MOD 30 MIN: CPT | Mod: S$GLB,,, | Performed by: PHYSICIAN ASSISTANT

## 2021-01-28 PROCEDURE — 99999 PR PBB SHADOW E&M-EST. PATIENT-LVL III: CPT | Mod: PBBFAC,,, | Performed by: PHYSICIAN ASSISTANT

## 2021-01-28 PROCEDURE — 3078F PR MOST RECENT DIASTOLIC BLOOD PRESSURE < 80 MM HG: ICD-10-PCS | Mod: CPTII,S$GLB,, | Performed by: PHYSICIAN ASSISTANT

## 2021-01-28 PROCEDURE — 99999 PR PBB SHADOW E&M-EST. PATIENT-LVL III: ICD-10-PCS | Mod: PBBFAC,,, | Performed by: PHYSICIAN ASSISTANT

## 2021-01-28 PROCEDURE — 3008F BODY MASS INDEX DOCD: CPT | Mod: CPTII,S$GLB,, | Performed by: PHYSICIAN ASSISTANT

## 2021-01-28 PROCEDURE — 99214 PR OFFICE/OUTPT VISIT, EST, LEVL IV, 30-39 MIN: ICD-10-PCS | Mod: S$GLB,,, | Performed by: PHYSICIAN ASSISTANT

## 2021-01-28 PROCEDURE — 3052F HG A1C>EQUAL 8.0%<EQUAL 9.0%: CPT | Mod: CPTII,S$GLB,, | Performed by: PHYSICIAN ASSISTANT

## 2021-01-28 PROCEDURE — 1159F MED LIST DOCD IN RCRD: CPT | Mod: S$GLB,,, | Performed by: PHYSICIAN ASSISTANT

## 2021-01-28 PROCEDURE — 3008F PR BODY MASS INDEX (BMI) DOCUMENTED: ICD-10-PCS | Mod: CPTII,S$GLB,, | Performed by: PHYSICIAN ASSISTANT

## 2021-01-28 RX ORDER — CYCLOBENZAPRINE HCL 10 MG
TABLET ORAL
Status: ON HOLD | COMMUNITY
Start: 2021-01-07 | End: 2023-11-08 | Stop reason: HOSPADM

## 2021-01-28 RX ORDER — ROSUVASTATIN CALCIUM 20 MG/1
20 TABLET, COATED ORAL NIGHTLY
Qty: 30 TABLET | Refills: 11 | Status: SHIPPED | OUTPATIENT
Start: 2021-01-28 | End: 2022-01-23

## 2021-01-28 RX ORDER — METFORMIN HYDROCHLORIDE 750 MG/1
750 TABLET, EXTENDED RELEASE ORAL 2 TIMES DAILY
COMMUNITY
Start: 2020-11-25

## 2021-01-28 RX ORDER — FAMOTIDINE 20 MG/1
20 TABLET, FILM COATED ORAL 2 TIMES DAILY
COMMUNITY
Start: 2021-01-25

## 2021-04-15 ENCOUNTER — PATIENT MESSAGE (OUTPATIENT)
Dept: RESEARCH | Facility: HOSPITAL | Age: 71
End: 2021-04-15

## 2021-08-14 ENCOUNTER — HOSPITAL ENCOUNTER (EMERGENCY)
Facility: HOSPITAL | Age: 71
Discharge: HOME | End: 2021-08-14
Attending: FAMILY MEDICINE
Payer: MEDICARE

## 2021-08-14 VITALS
SYSTOLIC BLOOD PRESSURE: 172 MMHG | BODY MASS INDEX: 28.99 KG/M2 | WEIGHT: 214 LBS | HEIGHT: 72 IN | DIASTOLIC BLOOD PRESSURE: 76 MMHG | RESPIRATION RATE: 20 BRPM | HEART RATE: 75 BPM | TEMPERATURE: 98 F | OXYGEN SATURATION: 95 %
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
## 2021-08-14 DIAGNOSIS — M79.604 RIGHT LEG PAIN: ICD-10-CM

## 2021-08-14 DIAGNOSIS — S99.921A RIGHT FOOT INJURY, INITIAL ENCOUNTER: Primary | ICD-10-CM

## 2021-08-14 PROCEDURE — 99281 PR EMERGENCY DEPT VISIT,LEVEL I: ICD-10-PCS | Mod: ,,, | Performed by: FAMILY MEDICINE

## 2021-08-14 PROCEDURE — 99281 EMR DPT VST MAYX REQ PHY/QHP: CPT | Mod: ,,, | Performed by: FAMILY MEDICINE

## 2021-08-14 PROCEDURE — 99283 EMERGENCY DEPT VISIT LOW MDM: CPT | Mod: 25,ER

## 2022-01-22 ENCOUNTER — OFFICE VISIT (OUTPATIENT)
Dept: URGENT CARE | Facility: CLINIC | Age: 72
End: 2022-01-22
Payer: MEDICARE

## 2022-01-22 VITALS
TEMPERATURE: 98 F | HEART RATE: 108 BPM | SYSTOLIC BLOOD PRESSURE: 163 MMHG | OXYGEN SATURATION: 98 % | RESPIRATION RATE: 20 BRPM | DIASTOLIC BLOOD PRESSURE: 69 MMHG | HEIGHT: 72 IN | WEIGHT: 215 LBS | BODY MASS INDEX: 29.12 KG/M2

## 2022-01-22 DIAGNOSIS — J06.9 UPPER RESPIRATORY TRACT INFECTION, UNSPECIFIED TYPE: Primary | ICD-10-CM

## 2022-01-22 LAB
CTP QC/QA: YES
SARS-COV-2 RDRP RESP QL NAA+PROBE: NEGATIVE

## 2022-01-22 PROCEDURE — 3078F PR MOST RECENT DIASTOLIC BLOOD PRESSURE < 80 MM HG: ICD-10-PCS | Mod: CPTII,S$GLB,, | Performed by: FAMILY MEDICINE

## 2022-01-22 PROCEDURE — 3008F PR BODY MASS INDEX (BMI) DOCUMENTED: ICD-10-PCS | Mod: CPTII,S$GLB,, | Performed by: FAMILY MEDICINE

## 2022-01-22 PROCEDURE — 1160F PR REVIEW ALL MEDS BY PRESCRIBER/CLIN PHARMACIST DOCUMENTED: ICD-10-PCS | Mod: CPTII,S$GLB,, | Performed by: FAMILY MEDICINE

## 2022-01-22 PROCEDURE — 1159F PR MEDICATION LIST DOCUMENTED IN MEDICAL RECORD: ICD-10-PCS | Mod: CPTII,S$GLB,, | Performed by: FAMILY MEDICINE

## 2022-01-22 PROCEDURE — 3008F BODY MASS INDEX DOCD: CPT | Mod: CPTII,S$GLB,, | Performed by: FAMILY MEDICINE

## 2022-01-22 PROCEDURE — 4010F ACE/ARB THERAPY RXD/TAKEN: CPT | Mod: CPTII,S$GLB,, | Performed by: FAMILY MEDICINE

## 2022-01-22 PROCEDURE — U0002 COVID-19 LAB TEST NON-CDC: HCPCS | Mod: QW,S$GLB,, | Performed by: FAMILY MEDICINE

## 2022-01-22 PROCEDURE — 99214 PR OFFICE/OUTPT VISIT, EST, LEVL IV, 30-39 MIN: ICD-10-PCS | Mod: S$GLB,,, | Performed by: FAMILY MEDICINE

## 2022-01-22 PROCEDURE — 1160F RVW MEDS BY RX/DR IN RCRD: CPT | Mod: CPTII,S$GLB,, | Performed by: FAMILY MEDICINE

## 2022-01-22 PROCEDURE — 4010F PR ACE/ARB THEARPY RXD/TAKEN: ICD-10-PCS | Mod: CPTII,S$GLB,, | Performed by: FAMILY MEDICINE

## 2022-01-22 PROCEDURE — 3077F SYST BP >= 140 MM HG: CPT | Mod: CPTII,S$GLB,, | Performed by: FAMILY MEDICINE

## 2022-01-22 PROCEDURE — 3077F PR MOST RECENT SYSTOLIC BLOOD PRESSURE >= 140 MM HG: ICD-10-PCS | Mod: CPTII,S$GLB,, | Performed by: FAMILY MEDICINE

## 2022-01-22 PROCEDURE — U0002: ICD-10-PCS | Mod: QW,S$GLB,, | Performed by: FAMILY MEDICINE

## 2022-01-22 PROCEDURE — 99214 OFFICE O/P EST MOD 30 MIN: CPT | Mod: S$GLB,,, | Performed by: FAMILY MEDICINE

## 2022-01-22 PROCEDURE — 1159F MED LIST DOCD IN RCRD: CPT | Mod: CPTII,S$GLB,, | Performed by: FAMILY MEDICINE

## 2022-01-22 PROCEDURE — 3078F DIAST BP <80 MM HG: CPT | Mod: CPTII,S$GLB,, | Performed by: FAMILY MEDICINE

## 2022-01-22 RX ORDER — FLUTICASONE PROPIONATE 50 MCG
1 SPRAY, SUSPENSION (ML) NASAL DAILY
Qty: 9.9 ML | Refills: 0 | Status: ON HOLD | OUTPATIENT
Start: 2022-01-22 | End: 2023-11-08 | Stop reason: HOSPADM

## 2022-01-22 NOTE — PROGRESS NOTES
Subjective:       Patient ID: Sherif Knutson Jr. is a 71 y.o. male.    Vitals:  height is 6' (1.829 m) and weight is 97.5 kg (215 lb). His oral temperature is 97.8 °F (36.6 °C). His blood pressure is 163/69 (abnormal) and his pulse is 108. His respiration is 20 and oxygen saturation is 98%.     Chief Complaint: URI    Pt states that he is having a cough , sore throat and congestion for about 2 days now . Pt is not taking any otc meds. Reports wife has tested positive for COVID. Fully vaccinated and boosted. No shortness of breath or other concerning symptoms.     URI   This is a new problem. The current episode started in the past 7 days. The problem has been unchanged. There has been no fever. Associated symptoms include congestion, coughing and a sore throat. He has tried nothing for the symptoms.       HENT: Positive for congestion and sore throat.    Respiratory: Positive for cough.        Objective:      Physical Exam   Constitutional: He does not appear ill. No distress. normal  HENT:   Head: Normocephalic and atraumatic.   Nose: Congestion present. No rhinorrhea.   Mouth/Throat: Posterior oropharyngeal erythema present. No oropharyngeal exudate.   Eyes: Pupils are equal, round, and reactive to light.      extraocular movement intact   Neck: Neck supple.   Cardiovascular: Normal rate, regular rhythm, normal heart sounds and normal pulses.   Pulmonary/Chest: Effort normal and breath sounds normal. He has no wheezes.   Abdominal: Normal appearance.   Neurological: He is alert.   Nursing note and vitals reviewed.    Results for orders placed or performed in visit on 01/22/22   POCT COVID-19 Rapid Screening   Result Value Ref Range    POC Rapid COVID Negative Negative     Acceptable Yes          Assessment:       1. Upper respiratory tract infection, unspecified type          Plan:         Upper respiratory tract infection, unspecified type  -     POCT COVID-19 Rapid Screening  -     fluticasone  propionate (FLONASE) 50 mcg/actuation nasal spray; 1 spray (50 mcg total) by Each Nostril route once daily.  Dispense: 9.9 mL; Refill: 0    recommended OTC mucinex. RTC prn worsening symptoms

## 2022-01-22 NOTE — PATIENT INSTRUCTIONS
Patient Education       Viral Upper Respiratory Infection Discharge Instructions, Adult   About this topic   You have an upper respiratory infection or URI. A URI can affect your nose, throat, ears, and sinuses. A virus is the cause of almost all URIs and antibiotics will not help you feel better more quickly. The common cold is an example of a viral URI.  URIs are easy to spread from person to person, most often through coughing or sneezing. A URI will almost always get better in a week or two without any treatment.         What care is needed at home?   · Ask your doctor what you need to do when you go home. Make sure you ask questions if you do not understand what the doctor says.  · If you smoke, try to quit. Your doctor or nurse can help.  · Drink lots of fluids like water, juice, or broth. This will help replace any fluids lost if you have a runny nose or fever. Warm tea or soup can help soothe a sore throat.  · If the air in your home feels dry, use a cool mist humidifier. This can help a stuffy nose and make it easier to breathe.  · You can also use saline nose drops to relieve stuffiness.  · If you decide to take over-the-counter cough or cold medicines, follow the directions on the label carefully. Be sure you do not take more than 1 medicine that contains acetaminophen. Also, if you have a heart problem or high blood pressure, check with your doctor before you take any of these medicines.  · Wash your hands often. Cough or sneeze into a tissue or your elbow instead of your hands. This will help keep others healthy.  What follow-up care is needed?   Your doctor may ask you to make visits to the office to check on your progress. Be sure to keep these visits.  What drugs may be needed?   The doctor may order drugs to:  · Open up the tubes of your lungs  · Treat viral infection  · Relieve or stop coughing  · Help with pain from a sore throat  · Relieve runny and stuffy nose  · Provide oxygen  Will physical  activity be limited?   You need to rest for a few days to let your body recover from the infection.  What changes to diet are needed?   Eat soft foods like soup if swallowing is too painful.  What problems could happen?   · Asthma attack  · Sinus infections  · Lung problems like pneumonia and bronchitis  · Severe fluid loss. This is dehydration.  What can be done to prevent this health problem?   · Wash your hands often with soap and water for at least 20 seconds, especially after coughing or sneezing. Alcohol-based hand sanitizers also work to kill the virus.  · If you are sick, cover your mouth and nose with tissue when you cough or sneeze. You can also cough into your elbow. Throw away tissues in the trash and wash your hands after touching used tissues.  · Do not get too close (kissing, hugging) to people who are sick.  · Do not share towels or hankies with anyone who is sick. Clean commonly handled things like door handles, remotes, toys, and phones. Wipe them with a disinfectant.  · Stay away from crowded places.  · Cover your nose and mouth when you sneeze or cough.  · Take vitamin C to help build up your body's ability to fight disease.  · Get a flu shot each year.  When do I need to call the doctor?   · You have trouble breathing when talking or sitting still.  · You have a fever of 100.4°F (38°C) or higher for several days, chills, a very bad sore throat, or ear or sinus pain.  · You develop a new fever after several days of feeling the same or improving.  · You develop chest pain when you cough.  · You have a cough that lasts more than 10 days.  · You cough up blood, or the color of the mucus you cough up changes.  Teach Back: Helping You Understand   The Teach Back Method helps you understand the information we are giving you. After you talk with the staff, tell them in your own words what you learned. This helps to make sure the staff has described each thing clearly. It also helps to explain things  that may have been confusing. Before going home, make sure you can do these:  · I can tell you about my condition.  · I can tell you what may help ease my signs.  · I can tell you what I will do if I have a fever, chills, breathing very fast, or trouble breathing.  Where can I learn more?   American Lung Association  https://www.lung.org/blog/can-you-exercise-with-a-cold   American Lung Association  https://www.lung.org/lung-health-diseases/lung-disease-lookup/influenza/facts-about-the-common-cold   NHS Choices  https://www.nhs.uk/conditions/respiratory-tract-infection/   UpToDate  https://www.[employer]/contents/the-common-cold-in-adults-beyond-the-basics   Last Reviewed Date   2021-06-08  Consumer Information Use and Disclaimer   This information is not specific medical advice and does not replace information you receive from your health care provider. This is only a brief summary of general information. It does NOT include all information about conditions, illnesses, injuries, tests, procedures, treatments, therapies, discharge instructions or life-style choices that may apply to you. You must talk with your health care provider for complete information about your health and treatment options. This information should not be used to decide whether or not to accept your health care providers advice, instructions or recommendations. Only your health care provider has the knowledge and training to provide advice that is right for you.  Copyright   Copyright © 2021 UpToDate, Inc. and its affiliates and/or licensors. All rights reserved.

## 2022-04-28 ENCOUNTER — OFFICE VISIT (OUTPATIENT)
Dept: CARDIOLOGY | Facility: CLINIC | Age: 72
End: 2022-04-28
Payer: MEDICARE

## 2022-04-28 VITALS
BODY MASS INDEX: 29.17 KG/M2 | HEIGHT: 72 IN | HEART RATE: 72 BPM | OXYGEN SATURATION: 95 % | DIASTOLIC BLOOD PRESSURE: 71 MMHG | SYSTOLIC BLOOD PRESSURE: 139 MMHG | WEIGHT: 215.38 LBS

## 2022-04-28 DIAGNOSIS — I25.10 CORONARY ARTERY DISEASE INVOLVING NATIVE CORONARY ARTERY OF NATIVE HEART WITHOUT ANGINA PECTORIS: ICD-10-CM

## 2022-04-28 DIAGNOSIS — Z95.820 S/P PERIPHERAL ARTERY ANGIOPLASTY WITH STENT PLACEMENT: ICD-10-CM

## 2022-04-28 DIAGNOSIS — Z79.4 TYPE 2 DIABETES MELLITUS WITH DIABETIC PERIPHERAL ANGIOPATHY WITHOUT GANGRENE, WITH LONG-TERM CURRENT USE OF INSULIN: ICD-10-CM

## 2022-04-28 DIAGNOSIS — Z95.1 S/P CABG X 5: ICD-10-CM

## 2022-04-28 DIAGNOSIS — E11.51 TYPE 2 DIABETES MELLITUS WITH DIABETIC PERIPHERAL ANGIOPATHY WITHOUT GANGRENE, WITH LONG-TERM CURRENT USE OF INSULIN: ICD-10-CM

## 2022-04-28 DIAGNOSIS — I73.9 PAD (PERIPHERAL ARTERY DISEASE): ICD-10-CM

## 2022-04-28 DIAGNOSIS — M62.81 MUSCLE WEAKNESS: ICD-10-CM

## 2022-04-28 DIAGNOSIS — I25.10 CORONARY ARTERY DISEASE INVOLVING NATIVE CORONARY ARTERY OF NATIVE HEART WITHOUT ANGINA PECTORIS: Primary | ICD-10-CM

## 2022-04-28 DIAGNOSIS — I10 ESSENTIAL HYPERTENSION: ICD-10-CM

## 2022-04-28 DIAGNOSIS — R55 SYNCOPE AND COLLAPSE: ICD-10-CM

## 2022-04-28 DIAGNOSIS — Z78.9 INTOLERANCE OF CONTINUOUS POSITIVE AIRWAY PRESSURE (CPAP) VENTILATION: ICD-10-CM

## 2022-04-28 PROCEDURE — 4010F PR ACE/ARB THEARPY RXD/TAKEN: ICD-10-PCS | Mod: CPTII,S$GLB,, | Performed by: INTERNAL MEDICINE

## 2022-04-28 PROCEDURE — 3078F PR MOST RECENT DIASTOLIC BLOOD PRESSURE < 80 MM HG: ICD-10-PCS | Mod: CPTII,S$GLB,, | Performed by: INTERNAL MEDICINE

## 2022-04-28 PROCEDURE — 1159F PR MEDICATION LIST DOCUMENTED IN MEDICAL RECORD: ICD-10-PCS | Mod: CPTII,S$GLB,, | Performed by: INTERNAL MEDICINE

## 2022-04-28 PROCEDURE — 99999 PR PBB SHADOW E&M-EST. PATIENT-LVL IV: ICD-10-PCS | Mod: PBBFAC,,, | Performed by: INTERNAL MEDICINE

## 2022-04-28 PROCEDURE — 4010F ACE/ARB THERAPY RXD/TAKEN: CPT | Mod: CPTII,S$GLB,, | Performed by: INTERNAL MEDICINE

## 2022-04-28 PROCEDURE — 99214 PR OFFICE/OUTPT VISIT, EST, LEVL IV, 30-39 MIN: ICD-10-PCS | Mod: S$GLB,,, | Performed by: INTERNAL MEDICINE

## 2022-04-28 PROCEDURE — 3075F PR MOST RECENT SYSTOLIC BLOOD PRESS GE 130-139MM HG: ICD-10-PCS | Mod: CPTII,S$GLB,, | Performed by: INTERNAL MEDICINE

## 2022-04-28 PROCEDURE — 3075F SYST BP GE 130 - 139MM HG: CPT | Mod: CPTII,S$GLB,, | Performed by: INTERNAL MEDICINE

## 2022-04-28 PROCEDURE — 99999 PR PBB SHADOW E&M-EST. PATIENT-LVL IV: CPT | Mod: PBBFAC,,, | Performed by: INTERNAL MEDICINE

## 2022-04-28 PROCEDURE — 3078F DIAST BP <80 MM HG: CPT | Mod: CPTII,S$GLB,, | Performed by: INTERNAL MEDICINE

## 2022-04-28 PROCEDURE — 3008F PR BODY MASS INDEX (BMI) DOCUMENTED: ICD-10-PCS | Mod: CPTII,S$GLB,, | Performed by: INTERNAL MEDICINE

## 2022-04-28 PROCEDURE — 1160F RVW MEDS BY RX/DR IN RCRD: CPT | Mod: CPTII,S$GLB,, | Performed by: INTERNAL MEDICINE

## 2022-04-28 PROCEDURE — 1126F AMNT PAIN NOTED NONE PRSNT: CPT | Mod: CPTII,S$GLB,, | Performed by: INTERNAL MEDICINE

## 2022-04-28 PROCEDURE — 3008F BODY MASS INDEX DOCD: CPT | Mod: CPTII,S$GLB,, | Performed by: INTERNAL MEDICINE

## 2022-04-28 PROCEDURE — 1159F MED LIST DOCD IN RCRD: CPT | Mod: CPTII,S$GLB,, | Performed by: INTERNAL MEDICINE

## 2022-04-28 PROCEDURE — 1126F PR PAIN SEVERITY QUANTIFIED, NO PAIN PRESENT: ICD-10-PCS | Mod: CPTII,S$GLB,, | Performed by: INTERNAL MEDICINE

## 2022-04-28 PROCEDURE — 99214 OFFICE O/P EST MOD 30 MIN: CPT | Mod: S$GLB,,, | Performed by: INTERNAL MEDICINE

## 2022-04-28 PROCEDURE — 1160F PR REVIEW ALL MEDS BY PRESCRIBER/CLIN PHARMACIST DOCUMENTED: ICD-10-PCS | Mod: CPTII,S$GLB,, | Performed by: INTERNAL MEDICINE

## 2022-04-28 NOTE — PROGRESS NOTES
"    PCP - Jose F Chicas MD  Subjective:   Patient ID:  Sherif Knutson Jr. is a 71 y.o. male who presents for  Follow up of Coronary Artery Disease; Hypertension; Hyperlipidemia     HPI: Sherif Knutson Jr. is a 71 y.o. with CAD s/p CABG x5 in 2000 (patent LIMA-LAD, and known occlusions of SVG-D1, SVG-OM, SVG-RCA), w/ subsequent PCIs to RCA (2006), LCx (2013), and OM4 (2017), hx left subclavian artery stent, HTN, HLD, DM2 who presents for follow up. Patient reports new onset episodes of clamminess/ cold sweats since he was last seen in our clinic. He reports Easter Sunday he was sitting at mass when suddenly he became diaphoretic and clammy. He denies associated chest discomfort or shortness of breath. Symptoms resolved spontaneously within 10 minutes. He had two similar recurrent episodes after - once while he was walking to his grandson's football game. He sat down with subsequent resolution. On full review of systems patient reports long-standing chronic shortness of breath on exertion and 3 pillow orthopnea which he subjectively feels has gotten better since losing 20 lb. He otherwise denies PND or peripheral swelling. He also acknowledges symmetrical bilateral hip pain w/radiation to his buttocks during ambulation (~1 block) which he attributes to sciatica. More recently he has been experiencing issues with imbalance and inability to walk "straight" - this sensation is relived by holding onto something. He otherwise denies near syncope or syncope. He reports neuropathy in his feet and numbness tingling in his digits on both hands as well.     Patient's last 2D echo in September 2019 showed preserved LVEF 55%.       History:     Past Medical History:   Diagnosis Date    CHF (congestive heart failure) 8/29/2017    Coronary artery disease 2006, 5/2013    s/p pci to rca (2006), LCx (5/2013)    DM (diabetes mellitus)     HLD (hyperlipidemia)     HTN (hypertension)     Kidney stone     EMELI (obstructive sleep " "apnea) 2015    Subclavian artery stenosis, left      Past Surgical History:   Procedure Laterality Date    BACK SURGERY      CHOLECYSTECTOMY      CORONARY ANGIOPLASTY      CORONARY ARTERY BYPASS GRAFT      5 bypass surgeries    SPINE SURGERY      TONSILLECTOMY       Social History     Tobacco Use    Smoking status: Never Smoker    Smokeless tobacco: Never Used   Substance Use Topics    Alcohol use: No     Alcohol/week: 0.0 standard drinks     Family History   Problem Relation Age of Onset    Heart disease Mother     Heart attack Mother     Heart disease Father     Heart attack Father     Hypertension Brother     Heart disease Paternal Uncle     Prostate cancer Neg Hx     Kidney disease Neg Hx        Meds:     Review of patient's allergies indicates:   Allergen Reactions    Sulfa (sulfonamide antibiotics) Photosensitivity    Zocor [simvastatin] Other (See Comments)     lathargic       Current Outpatient Medications:     aspirin 325 MG tablet, Take 325 mg by mouth once daily. , Disp: , Rfl:     azelastine (ASTELIN) 137 mcg (0.1 %) nasal spray, Two sprays each nostril twice daily, sniff full until absorbed, then follow with fluticasone nasal spray, Disp: 30 mL, Rfl: 11    BD ULTRA-FINE JERMAINE PEN NEEDLES 32 gauge x 5/32" Ndle, , Disp: , Rfl:     clopidogreL (PLAVIX) 75 mg tablet, TAKE 1 TABLET BY MOUTH ONCE DAILY IN THE EVENING, Disp: 30 tablet, Rfl: 0    COZAAR 50 mg tablet, once daily., Disp: , Rfl:     CYANOCOBALAMIN, VITAMIN B-12, (VITAMIN B-12 ORAL), Take by mouth., Disp: , Rfl:     cyclobenzaprine (FLEXERIL) 10 MG tablet, SMARTSI Tablet(s) By Mouth Every 12 Hours PRN, Disp: , Rfl:     EASY TOUCH 31 X 5/16 " Ndle, , Disp: , Rfl:     famotidine (PEPCID) 20 MG tablet, Take 20 mg by mouth 2 (two) times daily., Disp: , Rfl:     fluticasone (FLONASE) 50 mcg/actuation nasal spray, Two sprays each nostril twice daily, use after spraying with nasal last in , Disp: 1 " "Bottle, Rfl: 11    fluticasone propionate (FLONASE) 50 mcg/actuation nasal spray, 1 spray (50 mcg total) by Each Nostril route once daily., Disp: 9.9 mL, Rfl: 0    HUMALOG KWIKPEN INSULIN 100 unit/mL pen, 12 Units 3 (three) times daily., Disp: , Rfl:     isosorbide mononitrate (IMDUR) 30 MG 24 hr tablet, Take 1 tablet (30 mg total) by mouth once daily., Disp: 30 tablet, Rfl: 11    metFORMIN (GLUCOPHAGE-XR) 750 MG ER 24hr tablet, Take 750 mg by mouth 2 (two) times daily., Disp: , Rfl:     metoprolol tartrate (LOPRESSOR) 50 MG tablet, TAKE ONE TABLET BY MOUTH TWICE A DAY, Disp: 60 tablet, Rfl: 6    nitroGLYCERIN (NITROSTAT) 0.4 MG SL tablet, Place 1 tab under the tongue for chest pain every 5 minutes x 3; if chest pain not relieved after 3 doses go to the ED, Disp: 20 tablet, Rfl: 0    oxycodone-acetaminophen (PERCOCET)  mg per tablet, Take 1 tablet by mouth every 6 (six) hours as needed for Pain., Disp: 40 tablet, Rfl: 0    pregabalin (LYRICA) 300 MG Cap, 2 (two) times a day., Disp: , Rfl:     rosuvastatin (CRESTOR) 20 MG tablet, Take 1 tablet (20 mg total) by mouth every evening., Disp: 30 tablet, Rfl: 11    SURE COMFORT INSULIN SYRINGE 1/2 mL 31 x 5/16" Syrg, , Disp: , Rfl:     TRESIBA FLEXTOUCH U-200 200 unit/mL (3 mL) InPn, 64 Units once daily. , Disp: , Rfl:       Review of Systems   Constitutional: Negative for chills, diaphoresis, fever, malaise/fatigue and weight loss.        Diaphoresis , intermittent episodes   HENT: Negative for nosebleeds and sinus pain.    Respiratory: Positive for shortness of breath. Negative for cough and sputum production.    Cardiovascular: Positive for orthopnea. Negative for chest pain, palpitations, claudication, leg swelling and PND.   Gastrointestinal: Negative for abdominal pain, blood in stool, constipation, diarrhea, nausea and vomiting.   Genitourinary: Negative for dysuria, frequency, hematuria and urgency.   Musculoskeletal:        Hip pain "   Neurological: Negative for dizziness, loss of consciousness and weakness.        Gait /balance disturbance   Psychiatric/Behavioral: Negative for depression. The patient is not nervous/anxious.        Objective:   /71 (BP Location: Left arm, Patient Position: Sitting, BP Method: Large (Automatic))   Pulse 72   Ht 6' (1.829 m)   Wt 97.7 kg (215 lb 6.2 oz)   SpO2 95%   BMI 29.21 kg/m²   Physical Exam  Constitutional:       General: He is not in acute distress.  HENT:      Head: Normocephalic and atraumatic.   Eyes:      Conjunctiva/sclera: Conjunctivae normal.      Pupils: Pupils are equal, round, and reactive to light.   Neck:      Vascular: No carotid bruit or JVD.   Cardiovascular:      Rate and Rhythm: Normal rate and regular rhythm.      Pulses:           Radial pulses are 2+ on the right side and 2+ on the left side.      Heart sounds: Normal heart sounds. No murmur heard.    No friction rub. No gallop.      Comments: R PT biphasic by doppler  L PT biphasic by doppler    R DP difficult to locate and faint , biphasic by doppler    L DP faint , biphasic by doppler  Pulmonary:      Effort: Pulmonary effort is normal. No respiratory distress.      Breath sounds: Normal breath sounds. No wheezing or rales.   Chest:      Chest wall: No tenderness.   Abdominal:      General: Bowel sounds are normal. There is no distension.      Palpations: Abdomen is soft.      Tenderness: There is no abdominal tenderness.   Musculoskeletal:         General: Normal range of motion.      Cervical back: Normal range of motion and neck supple.   Skin:     General: Skin is warm and dry.      Findings: No erythema.   Neurological:      Mental Status: He is alert and oriented to person, place, and time.   Psychiatric:         Mood and Affect: Mood and affect normal.         Cognition and Memory: Memory normal.         Judgment: Judgment normal.         Labs:     Lab Results   Component Value Date     07/01/2020    K 5.2  (H) 07/01/2020     07/01/2020    CO2 30 (H) 07/01/2020    BUN 33 (H) 07/01/2020    CREATININE 1.5 (H) 07/01/2020    ANIONGAP 7 (L) 07/01/2020     Lab Results   Component Value Date    HGBA1C 8.0 (H) 07/01/2020     Lab Results   Component Value Date    BNP 22 07/30/2017    BNP 74 08/08/2014    BNP 48 06/28/2014       Lab Results   Component Value Date    WBC 7.13 07/01/2020    HGB 14.6 07/01/2020    HCT 46.3 07/01/2020     07/01/2020    GRAN 4.6 07/01/2020    GRAN 64.0 07/01/2020     Lab Results   Component Value Date    CHOL 92 (L) 07/01/2020    HDL 36 (L) 07/01/2020    LDLCALC 26.0 (L) 07/01/2020    TRIG 150 07/01/2020       Lab Results   Component Value Date     07/01/2020    K 5.2 (H) 07/01/2020     07/01/2020    CO2 30 (H) 07/01/2020    BUN 33 (H) 07/01/2020    CREATININE 1.5 (H) 07/01/2020    ANIONGAP 7 (L) 07/01/2020     Lab Results   Component Value Date    HGBA1C 8.0 (H) 07/01/2020     Lab Results   Component Value Date    BNP 22 07/30/2017    BNP 74 08/08/2014    BNP 48 06/28/2014    Lab Results   Component Value Date    WBC 7.13 07/01/2020    HGB 14.6 07/01/2020    HCT 46.3 07/01/2020     07/01/2020    GRAN 4.6 07/01/2020    GRAN 64.0 07/01/2020     Lab Results   Component Value Date    CHOL 92 (L) 07/01/2020    HDL 36 (L) 07/01/2020    LDLCALC 26.0 (L) 07/01/2020    TRIG 150 07/01/2020                  Assessment & Plan:     1- Diaphoresis   2- Coronary artery disease involving native coronary artery of native heart without angina pectoris  3- S/P CABG x 5 in 2000  - Patient is free of typical anginal symptoms but reports new episodes of diaphoresis / cold clammy sweats at rest and exertion of unclear etiology and clinical significance. Will obtain PET stress test for ischemic evaluation (patient unable to exercise due to hip pain and balance disturbance). Continue with EC aspirin 81 mg daily and Plavix 75 mg daily. Continue with Lopressor 50 mg twice daily and Imdur 30 mg  daily.      4- NYHA class II symptoms  - Patient reports chronic GLEZ and orthopnea. He appears euvolemic on exam today. Will update 2D echo to reassess LV function.     5- Essential hypertension  - Continue current antihypertensive regimen. Recheck BP at future visits.     6- Dyslipidemia  - 7/1/2020 LDL 26. Continue high intensity statin.     7- CKD stage 3  - Cr 1.5 w/ baseline ~1.4-1.8. Avoid nephrotoxins.     8- S/P subclavian artery angioplasty with stent placement in 8/2013  - Patient reports new episodes of diaphoresis at rest and exertion of unclear etiology and significance. Also noted to have 15 mmHg BP differential on exam today (lower on LUE) concerning for possible L subclavian artery in-stent restenosis/steal in patient with LIMA-LAD graft and L subclavian artery stenting in 2013. Will obtain Vascular US for further evaluation of his L subclavian stent.      9- Intolerance of continuous positive airway pressure (CPAP) ventilation  - Patient reports issues with CPAP tolerance in past however is interested in alternative methods/options to help with sleep disordered breathing. Referral placed to Sleep Medicine, appreciate assistance.      10- Type 2 diabetes mellitus with diabetic peripheral angiopathy without gangrene, with long-term current use of insulin  - 7/1/2020 HbA1c = 8%. Recommend strict glycemic control.      11- Balance disturbance  - Patient reports new onset imbalance issues during ambulation. Suspect this is likely related to his diabetic neuropathy. Referral placed to Neurology for further evaluation and management, appreciate assistance.     12- Hip pain   - Patient reports bilateral symmetrical hip pain during ambulation w/ radiation to his buttocks. Patient attributes this to his sciatica. However given his underlying vascular disease, will obtain ABIs to rule out component of peripheral artery disease / claudication.       Discussed with Dr Black    Signed:  Alma Rivera,  M.D.  Interventional Cardiology Fellow PGY-7  [facility]     I have personally taken the history and examined this patient and agree with the resident's note as stated above.  The patient reports symptoms concerning for an anginal equivalent.  The patient has DM2 and the differential diagnosis for his symptoms of diaphoresis also includes hypoglycemia.  Given the patient's known CAD, spencer order a stress test to evaluate for myocardial ischemia.  The patient will not be able to walk on a treadmill due to balance issues that are likely secondary to neuropathy. Therefore will order a PET stress test.  Plan as detailed above.  All of the patient's and his wife's questions were answered.

## 2022-05-09 ENCOUNTER — HOSPITAL ENCOUNTER (OUTPATIENT)
Dept: CARDIOLOGY | Facility: HOSPITAL | Age: 72
Discharge: HOME | End: 2022-05-09
Attending: INTERNAL MEDICINE
Payer: MEDICARE

## 2022-05-09 VITALS
WEIGHT: 215 LBS | DIASTOLIC BLOOD PRESSURE: 72 MMHG | HEIGHT: 72 IN | HEART RATE: 69 BPM | BODY MASS INDEX: 29.12 KG/M2 | SYSTOLIC BLOOD PRESSURE: 124 MMHG

## 2022-05-09 DIAGNOSIS — Z95.820 S/P PERIPHERAL ARTERY ANGIOPLASTY WITH STENT PLACEMENT: ICD-10-CM

## 2022-05-09 DIAGNOSIS — I73.9 PAD (PERIPHERAL ARTERY DISEASE): ICD-10-CM

## 2022-05-09 DIAGNOSIS — I25.10 CORONARY ARTERY DISEASE INVOLVING NATIVE CORONARY ARTERY OF NATIVE HEART WITHOUT ANGINA PECTORIS: ICD-10-CM

## 2022-05-09 DIAGNOSIS — M62.81 MUSCLE WEAKNESS: ICD-10-CM

## 2022-05-09 LAB
ASCENDING AORTA: 3.12 CM
AV INDEX (PROSTH): 0.75
AV MEAN GRADIENT: 3 MMHG
AV PEAK GRADIENT: 6 MMHG
AV VALVE AREA: 2.69 CM2
AV VELOCITY RATIO: 0.65
BSA FOR ECHO PROCEDURE: 2.23 M2
CV ECHO LV RWT: 0.46 CM
DOP CALC AO PEAK VEL: 1.2 M/S
DOP CALC AO VTI: 24.49 CM
DOP CALC LVOT AREA: 3.6 CM2
DOP CALC LVOT DIAMETER: 2.14 CM
DOP CALC LVOT PEAK VEL: 0.78 M/S
DOP CALC LVOT STROKE VOLUME: 65.9 CM3
DOP CALCLVOT PEAK VEL VTI: 18.33 CM
E WAVE DECELERATION TIME: 179.8 MSEC
E/A RATIO: 1.15
E/E' RATIO: 9.86 M/S
ECHO LV POSTERIOR WALL: 1 CM (ref 0.6–1.1)
EJECTION FRACTION: 62 %
FRACTIONAL SHORTENING: 37 % (ref 28–44)
IMMEDIATE ARM BP: 190 MMHG
IMMEDIATE LEFT ABI: 1.03
IMMEDIATE LEFT TIBIAL: 196 MMHG
IMMEDIATE RIGHT ABI: 0.95
IMMEDIATE RIGHT TIBIAL: 181 MMHG
INTERVENTRICULAR SEPTUM: 1.25 CM (ref 0.6–1.1)
LA MAJOR: 4.44 CM
LA MINOR: 4.45 CM
LA WIDTH: 3.69 CM
LEFT ABI: 1.16
LEFT ARM BP: 141 MMHG
LEFT ATRIUM SIZE: 3.98 CM
LEFT ATRIUM VOLUME INDEX MOD: 26.8 ML/M2
LEFT ATRIUM VOLUME INDEX: 25.2 ML/M2
LEFT ATRIUM VOLUME MOD: 58.98 CM3
LEFT ATRIUM VOLUME: 55.49 CM3
LEFT DORSALIS PEDIS: 160 MMHG
LEFT INTERNAL DIMENSION IN SYSTOLE: 2.75 CM (ref 2.1–4)
LEFT POSTERIOR TIBIAL: 163 MMHG
LEFT VENTRICLE DIASTOLIC VOLUME INDEX: 38.73 ML/M2
LEFT VENTRICLE DIASTOLIC VOLUME: 85.2 ML
LEFT VENTRICLE MASS INDEX: 78 G/M2
LEFT VENTRICLE SYSTOLIC VOLUME INDEX: 12.8 ML/M2
LEFT VENTRICLE SYSTOLIC VOLUME: 28.19 ML
LEFT VENTRICULAR INTERNAL DIMENSION IN DIASTOLE: 4.35 CM (ref 3.5–6)
LEFT VENTRICULAR MASS: 171.31 G
LEFT WRIST BRACHIAL INDEX: 1.04 WBI
LV LATERAL E/E' RATIO: 7.67 M/S
LV SEPTAL E/E' RATIO: 13.8 M/S
MV A" WAVE DURATION": 9.99 MSEC
MV PEAK A VEL: 0.6 M/S
MV PEAK E VEL: 0.69 M/S
MV STENOSIS PRESSURE HALF TIME: 52.14 MS
MV VALVE AREA P 1/2 METHOD: 4.22 CM2
PISA TR MAX VEL: 2.32 M/S
PULM VEIN S/D RATIO: 0.87
PV PEAK D VEL: 0.38 M/S
PV PEAK S VEL: 0.33 M/S
RA MAJOR: 4.33 CM
RA WIDTH: 3.5 CM
RIGHT ABI: 1.07
RIGHT ARM BP: 139 MMHG
RIGHT DORSALIS PEDIS: 139 MMHG
RIGHT POSTERIOR TIBIAL: 151 MMHG
RIGHT VENTRICULAR END-DIASTOLIC DIMENSION: 3.94 CM
RV TISSUE DOPPLER FREE WALL SYSTOLIC VELOCITY 1 (APICAL 4 CHAMBER VIEW): 9.06 CM/S
SINUS: 3.38 CM
STJ: 2.92 CM
TDI LATERAL: 0.09 M/S
TDI SEPTAL: 0.05 M/S
TDI: 0.07 M/S
TR MAX PG: 22 MMHG
TREADMILL GRADE: 12 %
TREADMILL SPEED: 2 MPH
TREADMILL TIME: 5 MIN
TRICUSPID ANNULAR PLANE SYSTOLIC EXCURSION: 1.56 CM
UPPER ARTERIAL LEFT ARM AXILLARY SYS MAX: 118 CM/S
UPPER ARTERIAL LEFT ARM BRACHIAL SYS MAX: 124 CM/S
UPPER ARTERIAL LEFT ARM RADIAL SYS MAX: 98 CM/S
UPPER ARTERIAL LEFT ARM SUBCLAVIAN SYS MAX: 129 CM/S
UPPER ARTERIAL LEFT ARM ULNAR SYS MAX: 83 CM/S

## 2022-05-09 PROCEDURE — 93306 ECHO (CUPID ONLY): ICD-10-PCS | Mod: 26,,, | Performed by: INTERNAL MEDICINE

## 2022-05-09 PROCEDURE — 93931 UPPER EXTREMITY STUDY: CPT | Mod: LT

## 2022-05-09 PROCEDURE — 93924 LWR XTR VASC STDY BILAT: CPT | Mod: 26,,, | Performed by: INTERNAL MEDICINE

## 2022-05-09 PROCEDURE — 93924 ANKLE BRACHIAL INDICES (ABI): ICD-10-PCS | Mod: 26,,, | Performed by: INTERNAL MEDICINE

## 2022-05-09 PROCEDURE — 93931 UPPER EXTREMITY STUDY: CPT | Mod: 26,LT,, | Performed by: INTERNAL MEDICINE

## 2022-05-09 PROCEDURE — 93924 LWR XTR VASC STDY BILAT: CPT

## 2022-05-09 PROCEDURE — 93306 TTE W/DOPPLER COMPLETE: CPT | Mod: 26,,, | Performed by: INTERNAL MEDICINE

## 2022-05-09 PROCEDURE — 93306 TTE W/DOPPLER COMPLETE: CPT

## 2022-05-09 PROCEDURE — 93931 CV US DOPPLER ARTERIAL ARM LEFT (CUPID ONLY): ICD-10-PCS | Mod: 26,LT,, | Performed by: INTERNAL MEDICINE

## 2022-05-17 ENCOUNTER — TELEPHONE (OUTPATIENT)
Dept: CARDIOLOGY | Facility: CLINIC | Age: 72
End: 2022-05-17
Payer: MEDICARE

## 2022-05-17 NOTE — TELEPHONE ENCOUNTER
----- Message from Juancho Black MD sent at 5/17/2022  3:07 PM CDT -----  Please call MsNela To had a and let him know that his left subclavian artery ultrasound is reported as being okay.  We are waiting on his stress test results.  Thanks

## 2022-05-27 ENCOUNTER — HOSPITAL ENCOUNTER (OUTPATIENT)
Dept: CARDIOLOGY | Facility: HOSPITAL | Age: 72
Discharge: HOME | End: 2022-05-27
Attending: INTERNAL MEDICINE
Payer: MEDICARE

## 2022-05-27 VITALS
HEART RATE: 63 BPM | WEIGHT: 215 LBS | HEIGHT: 72 IN | BODY MASS INDEX: 29.12 KG/M2 | SYSTOLIC BLOOD PRESSURE: 153 MMHG | DIASTOLIC BLOOD PRESSURE: 87 MMHG

## 2022-05-27 DIAGNOSIS — R55 SYNCOPE AND COLLAPSE: ICD-10-CM

## 2022-05-27 DIAGNOSIS — I25.10 CORONARY ARTERY DISEASE INVOLVING NATIVE CORONARY ARTERY OF NATIVE HEART WITHOUT ANGINA PECTORIS: ICD-10-CM

## 2022-05-27 LAB
CFR FLOW - ANTERIOR: 1.84
CFR FLOW - INFERIOR: 2.35
CFR FLOW - LATERAL: 2.12
CFR FLOW - MAX: 2.83
CFR FLOW - MIN: 1.4
CFR FLOW - SEPTAL: 2.07
CFR FLOW - WHOLE HEART: 2.1
CV PHARM DOSE: 0.4 MG
CV STRESS BASE HR: 63 BPM
DIASTOLIC BLOOD PRESSURE: 87 MMHG
EJECTION FRACTION- HIGH: 65 %
END DIASTOLIC INDEX-HIGH: 153 ML/M2
END DIASTOLIC INDEX-LOW: 93 ML/M2
END SYSTOLIC INDEX-HIGH: 71 ML/M2
END SYSTOLIC INDEX-LOW: 31 ML/M2
NUC REST DIASTOLIC VOLUME INDEX: 102
NUC REST EJECTION FRACTION: 49
NUC REST SYSTOLIC VOLUME INDEX: 52
NUC STRESS DIASTOLIC VOLUME INDEX: 120
NUC STRESS EJECTION FRACTION: 58 %
NUC STRESS SYSTOLIC VOLUME INDEX: 51
OHS CV CPX 85 PERCENT MAX PREDICTED HEART RATE MALE: 128
OHS CV CPX MAX PREDICTED HEART RATE: 150
OHS CV CPX PATIENT IS FEMALE: 0
OHS CV CPX PATIENT IS MALE: 1
OHS CV CPX PEAK DIASTOLIC BLOOD PRESSURE: 78 MMHG
OHS CV CPX PEAK HEAR RATE: 64 BPM
OHS CV CPX PEAK RATE PRESSURE PRODUCT: 9664
OHS CV CPX PEAK SYSTOLIC BLOOD PRESSURE: 151 MMHG
OHS CV CPX PERCENT MAX PREDICTED HEART RATE ACHIEVED: 43
OHS CV CPX RATE PRESSURE PRODUCT PRESENTING: 9639
REST FLOW - ANTERIOR: 0.55 CC/MIN/G
REST FLOW - INFERIOR: 0.52 CC/MIN/G
REST FLOW - LATERAL: 0.54 CC/MIN/G
REST FLOW - MAX: 0.72 CC/MIN/G
REST FLOW - MIN: 0.27 CC/MIN/G
REST FLOW - SEPTAL: 0.49 CC/MIN/G
REST FLOW - WHOLE HEART: 0.53 CC/MIN/G
RETIRED EF AND QEF - SEE NOTES: 53 %
STRESS FLOW - ANTERIOR: 0.99 CC/MIN/G
STRESS FLOW - INFERIOR: 1.23 CC/MIN/G
STRESS FLOW - LATERAL: 1.13 CC/MIN/G
STRESS FLOW - MAX: 1.55 CC/MIN/G
STRESS FLOW - MIN: 0.48 CC/MIN/G
STRESS FLOW - SEPTAL: 1 CC/MIN/G
STRESS FLOW - WHOLE HEART: 1.09 CC/MIN/G
SYSTOLIC BLOOD PRESSURE: 153 MMHG

## 2022-05-27 PROCEDURE — 78434 AQMBF PET REST & RX STRESS: CPT

## 2022-05-27 PROCEDURE — 78431 MYOCRD IMG PET RST&STRS CT: CPT | Mod: 26,,, | Performed by: INTERNAL MEDICINE

## 2022-05-27 PROCEDURE — 93018 CARDIAC PET SCAN STRESS (CUPID ONLY): ICD-10-PCS | Mod: ,,, | Performed by: INTERNAL MEDICINE

## 2022-05-27 PROCEDURE — 78434 AQMBF PET REST & RX STRESS: CPT | Mod: 26,,, | Performed by: INTERNAL MEDICINE

## 2022-05-27 PROCEDURE — 78431 MYOCRD IMG PET RST&STRS CT: CPT

## 2022-05-27 PROCEDURE — 93016 CARDIAC PET SCAN STRESS (CUPID ONLY): ICD-10-PCS | Mod: ,,, | Performed by: INTERNAL MEDICINE

## 2022-05-27 PROCEDURE — 78434 CARDIAC PET SCAN STRESS (CUPID ONLY): ICD-10-PCS | Mod: 26,,, | Performed by: INTERNAL MEDICINE

## 2022-05-27 PROCEDURE — 78431 CARDIAC PET SCAN STRESS (CUPID ONLY): ICD-10-PCS | Mod: 26,,, | Performed by: INTERNAL MEDICINE

## 2022-05-27 PROCEDURE — 93016 CV STRESS TEST SUPVJ ONLY: CPT | Mod: ,,, | Performed by: INTERNAL MEDICINE

## 2022-05-27 PROCEDURE — 63600175 PHARM REV CODE 636 W HCPCS: Performed by: INTERNAL MEDICINE

## 2022-05-27 PROCEDURE — 93018 CV STRESS TEST I&R ONLY: CPT | Mod: ,,, | Performed by: INTERNAL MEDICINE

## 2022-05-27 RX ORDER — REGADENOSON 0.08 MG/ML
0.4 INJECTION, SOLUTION INTRAVENOUS ONCE
Status: COMPLETED | OUTPATIENT
Start: 2022-05-27 | End: 2022-05-27

## 2022-05-27 RX ORDER — AMINOPHYLLINE 25 MG/ML
75 INJECTION, SOLUTION INTRAVENOUS ONCE
Status: COMPLETED | OUTPATIENT
Start: 2022-05-27 | End: 2022-05-27

## 2022-05-27 RX ADMIN — REGADENOSON 0.4 MG: 0.08 INJECTION, SOLUTION INTRAVENOUS at 07:05

## 2022-05-27 RX ADMIN — AMINOPHYLLINE 75 MG: 25 INJECTION, SOLUTION INTRAVENOUS at 07:05

## 2022-08-23 DIAGNOSIS — I20.9 ANGINA, CLASS III: ICD-10-CM

## 2022-08-23 RX ORDER — CLOPIDOGREL BISULFATE 75 MG/1
75 TABLET ORAL NIGHTLY
Qty: 90 TABLET | Refills: 3 | Status: SHIPPED | OUTPATIENT
Start: 2022-08-23 | End: 2023-09-05

## 2023-07-18 ENCOUNTER — TELEPHONE (OUTPATIENT)
Dept: OTOLARYNGOLOGY | Facility: CLINIC | Age: 73
End: 2023-07-18
Payer: MEDICARE

## 2023-10-09 ENCOUNTER — OFFICE VISIT (OUTPATIENT)
Dept: PODIATRY | Facility: CLINIC | Age: 73
End: 2023-10-09
Payer: MEDICARE

## 2023-10-09 VITALS — WEIGHT: 214.94 LBS | BODY MASS INDEX: 29.11 KG/M2 | HEIGHT: 72 IN

## 2023-10-09 DIAGNOSIS — L84 CORN OR CALLUS: ICD-10-CM

## 2023-10-09 DIAGNOSIS — E11.49 TYPE II DIABETES MELLITUS WITH NEUROLOGICAL MANIFESTATIONS: Primary | ICD-10-CM

## 2023-10-09 DIAGNOSIS — M20.40 HAMMER TOE, UNSPECIFIED LATERALITY: ICD-10-CM

## 2023-10-09 PROCEDURE — 4010F ACE/ARB THERAPY RXD/TAKEN: CPT | Mod: CPTII,S$GLB,, | Performed by: PODIATRIST

## 2023-10-09 PROCEDURE — 99203 PR OFFICE/OUTPT VISIT, NEW, LEVL III, 30-44 MIN: ICD-10-PCS | Mod: S$GLB,,, | Performed by: PODIATRIST

## 2023-10-09 PROCEDURE — 1159F PR MEDICATION LIST DOCUMENTED IN MEDICAL RECORD: ICD-10-PCS | Mod: CPTII,S$GLB,, | Performed by: PODIATRIST

## 2023-10-09 PROCEDURE — 4010F PR ACE/ARB THEARPY RXD/TAKEN: ICD-10-PCS | Mod: CPTII,S$GLB,, | Performed by: PODIATRIST

## 2023-10-09 PROCEDURE — 1159F MED LIST DOCD IN RCRD: CPT | Mod: CPTII,S$GLB,, | Performed by: PODIATRIST

## 2023-10-09 PROCEDURE — 3008F BODY MASS INDEX DOCD: CPT | Mod: CPTII,S$GLB,, | Performed by: PODIATRIST

## 2023-10-09 PROCEDURE — 99999 PR PBB SHADOW E&M-EST. PATIENT-LVL III: CPT | Mod: PBBFAC,,, | Performed by: PODIATRIST

## 2023-10-09 PROCEDURE — 3288F FALL RISK ASSESSMENT DOCD: CPT | Mod: CPTII,S$GLB,, | Performed by: PODIATRIST

## 2023-10-09 PROCEDURE — 1101F PT FALLS ASSESS-DOCD LE1/YR: CPT | Mod: CPTII,S$GLB,, | Performed by: PODIATRIST

## 2023-10-09 PROCEDURE — 99203 OFFICE O/P NEW LOW 30 MIN: CPT | Mod: S$GLB,,, | Performed by: PODIATRIST

## 2023-10-09 PROCEDURE — 1101F PR PT FALLS ASSESS DOC 0-1 FALLS W/OUT INJ PAST YR: ICD-10-PCS | Mod: CPTII,S$GLB,, | Performed by: PODIATRIST

## 2023-10-09 PROCEDURE — 3008F PR BODY MASS INDEX (BMI) DOCUMENTED: ICD-10-PCS | Mod: CPTII,S$GLB,, | Performed by: PODIATRIST

## 2023-10-09 PROCEDURE — 3288F PR FALLS RISK ASSESSMENT DOCUMENTED: ICD-10-PCS | Mod: CPTII,S$GLB,, | Performed by: PODIATRIST

## 2023-10-09 PROCEDURE — 99999 PR PBB SHADOW E&M-EST. PATIENT-LVL III: ICD-10-PCS | Mod: PBBFAC,,, | Performed by: PODIATRIST

## 2023-10-09 NOTE — PROGRESS NOTES
Subjective:     Patient ID: Sherif Knutson Jr. is a 72 y.o. male.    Chief Complaint: Blister (Left foot between 4th and 5th toe) and Diabetes Mellitus (9/5/23 Dr Black)    Sherif is a 72 y.o. male who presents to the podiatry clinic  with complaint of  left foot pain. Onset of the symptoms was several weeks ago. Precipitating event: none known. Current symptoms include: ability to bear weight, but with some pain. Aggravating factors: any weight bearing. Symptoms have progressed to a point and plateaued. Patient has had no prior foot problems. Evaluation to date: none. Treatment to date: none. Patients rates pain 4/10 on pain scale.    Review of Systems   Constitutional: Negative for chills.   Cardiovascular:  Negative for chest pain and claudication.   Respiratory:  Negative for cough.    Skin:  Positive for color change, dry skin and nail changes.   Musculoskeletal:  Positive for joint pain.   Gastrointestinal:  Negative for nausea.   Neurological:  Positive for paresthesias. Negative for numbness.   Psychiatric/Behavioral:  The patient is not nervous/anxious.         Objective:     Physical Exam  Constitutional:       Appearance: He is well-developed.      Comments: Oriented to time, place, and person.   Cardiovascular:      Comments: DP and PT pulses are palpable bilaterally. 3 sec capillary refill time and toes and feet are warm to touch proximally .  There is  hair growth on the feet and toes b/l. There is no edema b/l. No spider veins or varicosities present b/l.     Musculoskeletal:      Comments: Equinus noted b/l ankles with < 10 deg DF noted. MMT 5/5 in DF/PF/Inv/Ev resistance with no reproduction of pain in any direction. Passive range of motion of ankle and pedal joints is painless b/l.    Decreased stride, station of gait.  apropulsive toe off.  Increased angle and base of gait.      Patient has hammertoes of digits 2-5 bilateral partially reducible without symptom today.     Visible and palpable bunion  without pain at dorsomedial 1st metatarsal head right and left.  Hallux abducted right and left partially reducible, tracks laterally without being track bound.  No ecchymosis, erythema, edema, or cardinal signs infection or signs of trauma same foot.       Feet:      Right foot:      Skin integrity: No callus or dry skin.      Left foot:      Skin integrity: No callus or dry skin.   Lymphadenopathy:      Comments: Negative lymphadenopathy bilateral popliteal fossa and tarsal tunnel.   Skin:     Comments: Left medial 5th toe HPK    Neurological:      Mental Status: He is alert.      Comments: Light touch, proprioception, and sharp/dull sensation are all intact bilaterally. Protective threshold with the [place]ienstein monofilament is intact bilaterally.    Psychiatric:         Behavior: Behavior is cooperative.         Assessment:      Encounter Diagnoses   Name Primary?    Type II diabetes mellitus with neurological manifestations Yes    Hammer toe, unspecified laterality      Plan:     Sherif was seen today for blister and diabetes mellitus.    Diagnoses and all orders for this visit:    Type II diabetes mellitus with neurological manifestations  -     DIABETIC SHOES FOR HOME USE  -     Ankle Brachial Indices (TROY); Future    Hammer toe, unspecified laterality  -     DIABETIC SHOES FOR HOME USE      I counseled the patient on his conditions, their implications and medical management.        - Shoe inspection. Diabetic Foot Education. Patient reminded of the importance of good nutrition and blood sugar control to help prevent podiatric complications of diabetes. Patient instructed on proper foot hygeine. We discussed wearing proper shoe gear, daily foot inspections, never walking without protective shoe gear, caution putting sharp instruments to feet     - Discussed DM foot care:  Wear comfortable, proper fitting shoes. Wash feet daily. Dry well. After drying, apply moisturizer to feet (no lotion to webspaces).  Inspect feet daily for skin breaks, blisters, swelling, or redness. Wear cotton socks (preferably white)  Change socks every day. Do NOT walk barefoot. Do NOT use heating pads or warm/hot water soaks     TROY ordered    Rx diabetic shoes with custom molded inserts to be worn at all times while ambulating. Prescription provided with list of local retailers.     Toe spacers dispensed    With the patient's verbal consent a sterile #15 scalpel was used to trim the hyperkeratotic lesion described above. She tolerated the procedure well without complication.     RTC PRN

## 2023-11-07 ENCOUNTER — HOSPITAL ENCOUNTER (OUTPATIENT)
Facility: HOSPITAL | Age: 73
Discharge: HOME | End: 2023-11-08
Attending: STUDENT IN AN ORGANIZED HEALTH CARE EDUCATION/TRAINING PROGRAM | Admitting: HOSPITALIST
Payer: MEDICARE

## 2023-11-07 DIAGNOSIS — N17.9 AKI (ACUTE KIDNEY INJURY): ICD-10-CM

## 2023-11-07 DIAGNOSIS — R55 SYNCOPE AND COLLAPSE: ICD-10-CM

## 2023-11-07 DIAGNOSIS — E86.0 DEHYDRATION: ICD-10-CM

## 2023-11-07 DIAGNOSIS — R55 SYNCOPE: Primary | ICD-10-CM

## 2023-11-07 DIAGNOSIS — R07.9 CHEST PAIN: ICD-10-CM

## 2023-11-07 LAB
ALBUMIN SERPL-MCNC: 3.6 G/DL (ref 3.3–5.5)
ALP SERPL-CCNC: 81 U/L (ref 42–141)
BILIRUB SERPL-MCNC: 1.7 MG/DL (ref 0.2–1.6)
BUN SERPL-MCNC: 28 MG/DL (ref 7–22)
CALCIUM SERPL-MCNC: 9.8 MG/DL (ref 8–10.3)
CHLORIDE SERPL-SCNC: 103 MMOL/L (ref 98–108)
CREAT SERPL-MCNC: 2.6 MG/DL (ref 0.6–1.2)
GLUCOSE SERPL-MCNC: 171 MG/DL (ref 73–118)
HCT, POC: NORMAL
HGB, POC: NORMAL (ref 14–18)
MCH, POC: NORMAL
MCHC, POC: NORMAL
MCV, POC: NORMAL
MPV, POC: NORMAL
POC ALT (SGPT): 28 U/L (ref 10–47)
POC AST (SGOT): 26 U/L (ref 11–38)
POC CARDIAC TROPONIN I: 0 NG/ML (ref 0–0.08)
POC PLATELET COUNT: NORMAL
POC PTINR: 1.7 (ref 0.9–1.2)
POC PTWBT: 20.4 SEC (ref 9.7–14.3)
POC TCO2: 31 MMOL/L (ref 18–33)
POCT GLUCOSE: 169 MG/DL (ref 70–110)
POTASSIUM BLD-SCNC: 4.2 MMOL/L (ref 3.6–5.1)
PROTEIN, POC: 7.5 G/DL (ref 6.4–8.1)
RBC, POC: NORMAL
RDW, POC: NORMAL
SAMPLE: ABNORMAL
SAMPLE: NORMAL
SODIUM BLD-SCNC: 139 MMOL/L (ref 128–145)
WBC, POC: NORMAL

## 2023-11-07 PROCEDURE — 25000003 PHARM REV CODE 250: Mod: ER | Performed by: STUDENT IN AN ORGANIZED HEALTH CARE EDUCATION/TRAINING PROGRAM

## 2023-11-07 PROCEDURE — 84484 ASSAY OF TROPONIN QUANT: CPT | Mod: ER

## 2023-11-07 PROCEDURE — 83735 ASSAY OF MAGNESIUM: CPT | Performed by: STUDENT IN AN ORGANIZED HEALTH CARE EDUCATION/TRAINING PROGRAM

## 2023-11-07 PROCEDURE — 96360 HYDRATION IV INFUSION INIT: CPT | Mod: ER

## 2023-11-07 PROCEDURE — 82962 GLUCOSE BLOOD TEST: CPT | Mod: ER

## 2023-11-07 PROCEDURE — 99285 EMERGENCY DEPT VISIT HI MDM: CPT | Mod: 25,ER

## 2023-11-07 PROCEDURE — 85025 COMPLETE CBC W/AUTO DIFF WBC: CPT | Mod: ER

## 2023-11-07 PROCEDURE — 81003 URINALYSIS AUTO W/O SCOPE: CPT | Mod: ER

## 2023-11-07 PROCEDURE — 80053 COMPREHEN METABOLIC PANEL: CPT | Mod: ER

## 2023-11-07 RX ADMIN — SODIUM CHLORIDE 500 ML: 9 INJECTION, SOLUTION INTRAVENOUS at 11:11

## 2023-11-07 RX ADMIN — SODIUM CHLORIDE 1000 ML: 9 INJECTION, SOLUTION INTRAVENOUS at 10:11

## 2023-11-08 VITALS
HEART RATE: 74 BPM | WEIGHT: 206.13 LBS | OXYGEN SATURATION: 95 % | SYSTOLIC BLOOD PRESSURE: 146 MMHG | TEMPERATURE: 98 F | BODY MASS INDEX: 27.92 KG/M2 | HEIGHT: 72 IN | DIASTOLIC BLOOD PRESSURE: 66 MMHG | RESPIRATION RATE: 18 BRPM

## 2023-11-08 PROBLEM — R55 SYNCOPE AND COLLAPSE: Status: ACTIVE | Noted: 2023-11-08

## 2023-11-08 PROBLEM — N20.0 KIDNEY STONES: Status: ACTIVE | Noted: 2023-11-08

## 2023-11-08 PROBLEM — N17.9 AKI (ACUTE KIDNEY INJURY): Status: ACTIVE | Noted: 2023-11-08

## 2023-11-08 PROBLEM — N18.9 ACUTE KIDNEY INJURY SUPERIMPOSED ON CKD: Status: ACTIVE | Noted: 2023-11-08

## 2023-11-08 PROBLEM — R31.9 HEMATURIA: Status: ACTIVE | Noted: 2023-11-08
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
## 2023-11-08 LAB
ALBUMIN SERPL BCP-MCNC: 3.2 G/DL (ref 3.5–5.2)
ALP SERPL-CCNC: 66 U/L (ref 55–135)
ALT SERPL W/O P-5'-P-CCNC: 22 U/L (ref 10–44)
AMORPH CRY URNS QL MICRO: ABNORMAL
AMPHET+METHAMPHET UR QL: NEGATIVE
ANION GAP SERPL CALC-SCNC: 9 MMOL/L (ref 8–16)
ANION GAP SERPL CALC-SCNC: 9 MMOL/L (ref 8–16)
ASCENDING AORTA: 2.99 CM
AST SERPL-CCNC: 20 U/L (ref 10–40)
AV INDEX (PROSTH): 0.84
AV MEAN GRADIENT: 4 MMHG
AV PEAK GRADIENT: 6 MMHG
AV VALVE AREA BY VELOCITY RATIO: 2.63 CM²
AV VALVE AREA: 2.77 CM²
AV VELOCITY RATIO: 0.8
BACTERIA #/AREA URNS HPF: ABNORMAL /HPF
BARBITURATES UR QL SCN>200 NG/ML: NEGATIVE
BASOPHILS # BLD AUTO: 0.04 K/UL (ref 0–0.2)
BASOPHILS NFR BLD: 0.6 % (ref 0–1.9)
BENZODIAZ UR QL SCN>200 NG/ML: NEGATIVE
BILIRUB SERPL-MCNC: 1.3 MG/DL (ref 0.1–1)
BILIRUB UR QL STRIP: NEGATIVE
BILIRUBIN, POC UA: ABNORMAL
BLOOD, POC UA: ABNORMAL
BSA FOR ECHO PROCEDURE: 2.18 M2
BUN SERPL-MCNC: 27 MG/DL (ref 8–23)
BUN SERPL-MCNC: 29 MG/DL (ref 8–23)
BZE UR QL SCN: NEGATIVE
CALCIUM SERPL-MCNC: 8.6 MG/DL (ref 8.7–10.5)
CALCIUM SERPL-MCNC: 8.9 MG/DL (ref 8.7–10.5)
CANNABINOIDS UR QL SCN: NEGATIVE
CHLORIDE SERPL-SCNC: 106 MMOL/L (ref 95–110)
CHLORIDE SERPL-SCNC: 106 MMOL/L (ref 95–110)
CLARITY UR: ABNORMAL
CLARITY, POC UA: CLEAR
CO2 SERPL-SCNC: 25 MMOL/L (ref 23–29)
CO2 SERPL-SCNC: 25 MMOL/L (ref 23–29)
COLOR UR: YELLOW
COLOR, POC UA: ABNORMAL
CREAT SERPL-MCNC: 2.1 MG/DL (ref 0.5–1.4)
CREAT SERPL-MCNC: 2.5 MG/DL (ref 0.5–1.4)
CREAT UR-MCNC: 63.5 MG/DL (ref 23–375)
CV ECHO LV RWT: 0.44 CM
DIFFERENTIAL METHOD: ABNORMAL
DOP CALC AO PEAK VEL: 1.19 M/S
DOP CALC AO VTI: 24.4 CM
DOP CALC LVOT AREA: 3.3 CM2
DOP CALC LVOT DIAMETER: 2.05 CM
DOP CALC LVOT PEAK VEL: 0.95 M/S
DOP CALC LVOT STROKE VOLUME: 67.63 CM3
DOP CALC MV VTI: 23.3 CM
DOP CALCLVOT PEAK VEL VTI: 20.5 CM
E WAVE DECELERATION TIME: 184.21 MSEC
E/A RATIO: 0.92
E/E' RATIO: 9.65 M/S
ECHO LV POSTERIOR WALL: 1 CM (ref 0.6–1.1)
EOSINOPHIL # BLD AUTO: 0.1 K/UL (ref 0–0.5)
EOSINOPHIL NFR BLD: 1.2 % (ref 0–8)
ERYTHROCYTE [DISTWIDTH] IN BLOOD BY AUTOMATED COUNT: 13.2 % (ref 11.5–14.5)
EST. GFR  (NO RACE VARIABLE): 27 ML/MIN/1.73 M^2
EST. GFR  (NO RACE VARIABLE): 33 ML/MIN/1.73 M^2
ESTIMATED AVG GLUCOSE: 180 MG/DL (ref 68–131)
FRACTIONAL SHORTENING: 42 % (ref 28–44)
GLUCOSE SERPL-MCNC: 129 MG/DL (ref 70–110)
GLUCOSE SERPL-MCNC: 247 MG/DL (ref 70–110)
GLUCOSE UR QL STRIP: NEGATIVE
GLUCOSE, POC UA: NEGATIVE
HBA1C MFR BLD: 7.9 % (ref 4–5.6)
HCT VFR BLD AUTO: 41.8 % (ref 40–54)
HGB BLD-MCNC: 12.8 G/DL (ref 14–18)
HGB UR QL STRIP: ABNORMAL
HYALINE CASTS #/AREA URNS LPF: 0 /LPF
IMM GRANULOCYTES # BLD AUTO: 0.02 K/UL (ref 0–0.04)
IMM GRANULOCYTES NFR BLD AUTO: 0.3 % (ref 0–0.5)
INTERVENTRICULAR SEPTUM: 1.28 CM (ref 0.6–1.1)
IVC DIAMETER: 2.12 CM
IVRT: 95.15 MSEC
KETONES UR QL STRIP: NEGATIVE
KETONES, POC UA: ABNORMAL
LA MAJOR: 5.41 CM
LA MINOR: 5.45 CM
LA WIDTH: 3.7 CM
LEFT ATRIUM SIZE: 4.91 CM
LEFT ATRIUM VOLUME INDEX: 38.8 ML/M2
LEFT ATRIUM VOLUME: 83.85 CM3
LEFT INTERNAL DIMENSION IN SYSTOLE: 2.65 CM (ref 2.1–4)
LEFT VENTRICLE DIASTOLIC VOLUME INDEX: 43.75 ML/M2
LEFT VENTRICLE DIASTOLIC VOLUME: 94.5 ML
LEFT VENTRICLE MASS INDEX: 86 G/M2
LEFT VENTRICLE SYSTOLIC VOLUME INDEX: 11.9 ML/M2
LEFT VENTRICLE SYSTOLIC VOLUME: 25.8 ML
LEFT VENTRICULAR INTERNAL DIMENSION IN DIASTOLE: 4.54 CM (ref 3.5–6)
LEFT VENTRICULAR MASS: 186.67 G
LEUKOCYTE EST, POC UA: NEGATIVE
LEUKOCYTE ESTERASE UR QL STRIP: NEGATIVE
LV LATERAL E/E' RATIO: 7.45 M/S
LV SEPTAL E/E' RATIO: 13.67 M/S
LVOT MG: 2 MMHG
LVOT MV: 0.67 CM/S
LYMPHOCYTES # BLD AUTO: 1.6 K/UL (ref 1–4.8)
LYMPHOCYTES NFR BLD: 21.4 % (ref 18–48)
MAGNESIUM SERPL-MCNC: 2.1 MG/DL (ref 1.6–2.6)
MAGNESIUM SERPL-MCNC: 2.2 MG/DL (ref 1.6–2.6)
MCH RBC QN AUTO: 28.7 PG (ref 27–31)
MCHC RBC AUTO-ENTMCNC: 30.6 G/DL (ref 32–36)
MCV RBC AUTO: 94 FL (ref 82–98)
METHADONE UR QL SCN>300 NG/ML: NEGATIVE
MICROSCOPIC COMMENT: ABNORMAL
MONOCYTES # BLD AUTO: 0.8 K/UL (ref 0.3–1)
MONOCYTES NFR BLD: 10.5 % (ref 4–15)
MV MEAN GRADIENT: 1 MMHG
MV PEAK A VEL: 0.89 M/S
MV PEAK E VEL: 0.82 M/S
MV PEAK GRADIENT: 3 MMHG
MV STENOSIS PRESSURE HALF TIME: 53.42 MS
MV VALVE AREA BY CONTINUITY EQUATION: 2.9 CM2
MV VALVE AREA P 1/2 METHOD: 4.12 CM2
NEUTROPHILS # BLD AUTO: 4.8 K/UL (ref 1.8–7.7)
NEUTROPHILS NFR BLD: 66 % (ref 38–73)
NITRITE UR QL STRIP: NEGATIVE
NITRITE, POC UA: NEGATIVE
NRBC BLD-RTO: 0 /100 WBC
OPIATES UR QL SCN: NEGATIVE
PCP UR QL SCN>25 NG/ML: NEGATIVE
PH UR STRIP: 5.5 [PH]
PH UR STRIP: 6 [PH] (ref 5–8)
PHOSPHATE SERPL-MCNC: 3.5 MG/DL (ref 2.7–4.5)
PISA TR MAX VEL: 1.69 M/S
PLATELET # BLD AUTO: 159 K/UL (ref 150–450)
PMV BLD AUTO: 10.5 FL (ref 9.2–12.9)
POCT GLUCOSE: 117 MG/DL (ref 70–110)
POCT GLUCOSE: 196 MG/DL (ref 70–110)
POCT GLUCOSE: 227 MG/DL (ref 70–110)
POTASSIUM SERPL-SCNC: 4 MMOL/L (ref 3.5–5.1)
POTASSIUM SERPL-SCNC: 4.5 MMOL/L (ref 3.5–5.1)
PROT SERPL-MCNC: 6.4 G/DL (ref 6–8.4)
PROT UR QL STRIP: ABNORMAL
PROTEIN, POC UA: ABNORMAL
PV PEAK GRADIENT: 9 MMHG
PV PEAK VELOCITY: 1.51 M/S
RA MAJOR: 5.03 CM
RA PRESSURE ESTIMATED: 3 MMHG
RA WIDTH: 4.3 CM
RBC # BLD AUTO: 4.46 M/UL (ref 4.6–6.2)
RBC #/AREA URNS HPF: 12 /HPF (ref 0–4)
RIGHT VENTRICULAR END-DIASTOLIC DIMENSION: 3.99 CM
RV TB RVSP: 5 MMHG
RV TISSUE DOPPLER FREE WALL SYSTOLIC VELOCITY 1 (APICAL 4 CHAMBER VIEW): 15.24 CM/S
SINUS: 3.12 CM
SODIUM SERPL-SCNC: 140 MMOL/L (ref 136–145)
SODIUM SERPL-SCNC: 140 MMOL/L (ref 136–145)
SP GR UR STRIP: 1.01 (ref 1–1.03)
SPECIFIC GRAVITY, POC UA: 1.02
STJ: 2.32 CM
TDI LATERAL: 0.11 M/S
TDI SEPTAL: 0.06 M/S
TDI: 0.09 M/S
TOXICOLOGY INFORMATION: NORMAL
TR MAX PG: 11 MMHG
TRICUSPID ANNULAR PLANE SYSTOLIC EXCURSION: 1.57 CM
TV REST PULMONARY ARTERY PRESSURE: 14 MMHG
URN SPEC COLLECT METH UR: ABNORMAL
UROBILINOGEN UR STRIP-ACNC: NEGATIVE EU/DL
UROBILINOGEN, POC UA: 0.2 E.U./DL
WBC # BLD AUTO: 7.23 K/UL (ref 3.9–12.7)
WBC #/AREA URNS HPF: 1 /HPF (ref 0–5)
Z-SCORE OF LEFT VENTRICULAR DIMENSION IN END DIASTOLE: -4.44
Z-SCORE OF LEFT VENTRICULAR DIMENSION IN END SYSTOLE: -3.85

## 2023-11-08 PROCEDURE — 25000003 PHARM REV CODE 250: Performed by: NURSE PRACTITIONER

## 2023-11-08 PROCEDURE — G0378 HOSPITAL OBSERVATION PER HR: HCPCS

## 2023-11-08 PROCEDURE — 83735 ASSAY OF MAGNESIUM: CPT | Performed by: NURSE PRACTITIONER

## 2023-11-08 PROCEDURE — 84100 ASSAY OF PHOSPHORUS: CPT | Performed by: NURSE PRACTITIONER

## 2023-11-08 PROCEDURE — 85025 COMPLETE CBC W/AUTO DIFF WBC: CPT | Performed by: NURSE PRACTITIONER

## 2023-11-08 PROCEDURE — 81000 URINALYSIS NONAUTO W/SCOPE: CPT | Mod: 59 | Performed by: NURSE PRACTITIONER

## 2023-11-08 PROCEDURE — 80048 BASIC METABOLIC PNL TOTAL CA: CPT | Mod: XB | Performed by: FAMILY MEDICINE

## 2023-11-08 PROCEDURE — 36415 COLL VENOUS BLD VENIPUNCTURE: CPT | Performed by: NURSE PRACTITIONER

## 2023-11-08 PROCEDURE — 36415 COLL VENOUS BLD VENIPUNCTURE: CPT | Performed by: FAMILY MEDICINE

## 2023-11-08 PROCEDURE — 80307 DRUG TEST PRSMV CHEM ANLYZR: CPT | Performed by: NURSE PRACTITIONER

## 2023-11-08 PROCEDURE — G0378 HOSPITAL OBSERVATION PER HR: HCPCS | Mod: ER

## 2023-11-08 PROCEDURE — 80053 COMPREHEN METABOLIC PANEL: CPT | Performed by: NURSE PRACTITIONER

## 2023-11-08 PROCEDURE — 83036 HEMOGLOBIN GLYCOSYLATED A1C: CPT | Performed by: NURSE PRACTITIONER

## 2023-11-08 RX ORDER — ONDANSETRON 2 MG/ML
4 INJECTION INTRAMUSCULAR; INTRAVENOUS EVERY 6 HOURS PRN
Status: DISCONTINUED | OUTPATIENT
Start: 2023-11-08 | End: 2023-11-08 | Stop reason: HOSPADM

## 2023-11-08 RX ORDER — ASPIRIN 325 MG
325 TABLET ORAL DAILY
Status: DISCONTINUED | OUTPATIENT
Start: 2023-11-08 | End: 2023-11-08

## 2023-11-08 RX ORDER — SODIUM CHLORIDE 0.9 % (FLUSH) 0.9 %
10 SYRINGE (ML) INJECTION EVERY 12 HOURS PRN
Status: DISCONTINUED | OUTPATIENT
Start: 2023-11-08 | End: 2023-11-08 | Stop reason: HOSPADM

## 2023-11-08 RX ORDER — ASPIRIN 81 MG/1
81 TABLET ORAL DAILY
Status: DISCONTINUED | OUTPATIENT
Start: 2023-11-08 | End: 2023-11-08 | Stop reason: HOSPADM

## 2023-11-08 RX ORDER — AMOXICILLIN 250 MG
1 CAPSULE ORAL DAILY PRN
Status: DISCONTINUED | OUTPATIENT
Start: 2023-11-08 | End: 2023-11-08 | Stop reason: HOSPADM

## 2023-11-08 RX ORDER — IBUPROFEN 200 MG
24 TABLET ORAL
Status: DISCONTINUED | OUTPATIENT
Start: 2023-11-08 | End: 2023-11-08 | Stop reason: HOSPADM

## 2023-11-08 RX ORDER — GLUCAGON 1 MG
1 KIT INJECTION
Status: DISCONTINUED | OUTPATIENT
Start: 2023-11-08 | End: 2023-11-08 | Stop reason: HOSPADM

## 2023-11-08 RX ORDER — INSULIN ASPART 100 [IU]/ML
0-5 INJECTION, SOLUTION INTRAVENOUS; SUBCUTANEOUS
Status: DISCONTINUED | OUTPATIENT
Start: 2023-11-08 | End: 2023-11-08 | Stop reason: HOSPADM

## 2023-11-08 RX ORDER — SODIUM CHLORIDE 9 MG/ML
INJECTION, SOLUTION INTRAVENOUS CONTINUOUS
Status: DISCONTINUED | OUTPATIENT
Start: 2023-11-08 | End: 2023-11-08 | Stop reason: HOSPADM

## 2023-11-08 RX ORDER — ACETAMINOPHEN 325 MG/1
650 TABLET ORAL EVERY 6 HOURS PRN
Status: DISCONTINUED | OUTPATIENT
Start: 2023-11-08 | End: 2023-11-08 | Stop reason: HOSPADM

## 2023-11-08 RX ORDER — IBUPROFEN 200 MG
16 TABLET ORAL
Status: DISCONTINUED | OUTPATIENT
Start: 2023-11-08 | End: 2023-11-08 | Stop reason: HOSPADM

## 2023-11-08 RX ORDER — TALC
6 POWDER (GRAM) TOPICAL NIGHTLY PRN
Status: DISCONTINUED | OUTPATIENT
Start: 2023-11-08 | End: 2023-11-08 | Stop reason: HOSPADM

## 2023-11-08 RX ORDER — NALOXONE HCL 0.4 MG/ML
0.02 VIAL (ML) INJECTION
Status: DISCONTINUED | OUTPATIENT
Start: 2023-11-08 | End: 2023-11-08 | Stop reason: HOSPADM

## 2023-11-08 RX ORDER — ASPIRIN 81 MG/1
81 TABLET ORAL DAILY
COMMUNITY

## 2023-11-08 RX ORDER — CLOPIDOGREL BISULFATE 75 MG/1
75 TABLET ORAL NIGHTLY
Status: DISCONTINUED | OUTPATIENT
Start: 2023-11-08 | End: 2023-11-08 | Stop reason: HOSPADM

## 2023-11-08 RX ADMIN — ASPIRIN 81 MG: 81 TABLET, COATED ORAL at 10:11

## 2023-11-08 RX ADMIN — CLOPIDOGREL BISULFATE 75 MG: 75 TABLET ORAL at 04:11

## 2023-11-08 RX ADMIN — SODIUM CHLORIDE: 9 INJECTION, SOLUTION INTRAVENOUS at 04:11

## 2023-11-08 NOTE — NURSING
[facility], [facility]  Nurses Note -- 4 Eyes      11/8/2023       Skin assessed on: Q Shift      [x] No Pressure Injuries Present    []Prevention Measures Documented    [] Yes LDA  for Pressure Injury Previously documented     [] Yes New Pressure Injury Discovered   [] LDA for New Pressure Injury Added      Attending:  Polina Cazares LPN     Second RN:  Chrissy Mariee RN

## 2023-11-08 NOTE — NURSING
[facility], [facility]  Nurses Note -- 4 Eyes      11/8/2023       Skin assessed on: Admit      [x] No Pressure Injuries Present    [x]Prevention Measures Documented    [] Yes LDA  for Pressure Injury Previously documented     [] Yes New Pressure Injury Discovered   [] LDA for New Pressure Injury Added      Attending RN:  TERRANCE SORIANO RN     Second RN:  Tamara BARKER

## 2023-11-08 NOTE — PLAN OF CARE
Pt is AAOx4. Room air. Tele maintained.  No falls or injures reported during shift, safety precautions maintained.    Problem: Adult Inpatient Plan of Care  Goal: Plan of Care Review  Outcome: Ongoing, Progressing     Problem: Adult Inpatient Plan of Care  Goal: Optimal Comfort and Wellbeing  Outcome: Ongoing, Progressing

## 2023-11-08 NOTE — H&P
"[facility] Medicine  History & Physical    Patient Name: Sherif Knutson Jr.  MRN: [id number]  Patient Class: OP- Observation  Admission Date: 11/7/2023  Attending Physician: Lamar Daley MD   Primary Care Provider: Jose F Menon MD         Patient information was obtained from patient, spouse/SO, past medical records and ER records.     Subjective:     Principal Problem:Syncope and collapse    Chief Complaint:   Chief Complaint   Patient presents with    Loss of Consciousness     SYNCOPAL  EPISODE  AFTER LYING DOWN- N/V/D ONSET  YESTERDAY        HPI: 72 y.o. male, with a PMHx of CHF, CAD, DM, HLD, HTN and kidney stones, who presents to the ED with syncope, neck pain, decrease in appetite, diarrhea, nausea and vomiting, onset 1 day ago. Patient reports associated symptom of hematuria that has resolved PTA. Patient reports he passed multiple kidney stones, Sunday and Monday, hematuria likely 2/2 kidney stones. Patient states, on yesterday, he felt lightheaded after a nap, and then experienced an episode of syncope while standing. Independent Historian: Patient's wife reports that the patient hit the wall and a door during the episode. Patient's wife reports that the patient had multiple episodes of emesis 1 day ago and denies seeing any blood or coffee colored vomit. Patient states that his neck pain, 2/2 fall, makes it hard for him to hold his head up. Spouse reports similar past episodes of syncope, about a year ago, 2/2 low blood pressure. She reports BP meds have been adjusted multiple times since then. He endorses daily compliance with Plavix, aspirin, cozaar, lopressor, isosorbide, lantus and Humalog. Patient denies the use of tobacco, Illicit drugs or alcohol. He follows with nephrology but has not had an appointment since 9/2022.  Patient notes that he has not seen a urologist "in a while," also. He was suppose to have a follow up appointment with PCP on Monday but did not make it " because he was not feeling well. No other exacerbating or alleviating factors. Denies fever, chest pain, SOB, leg swelling, bloody stool, back pain, abdominal pain, flank pain, headaches or other associated symptoms.  Work up revealed CBC unremarkable, glucose 129, creatinine at 2.5(according to last nephrology note in , baseline creatine is 1.8), bilirubin 1.3. CT of cervical spine and head without acute findings.  Urinalysis, carotid ultrasound, drug screen and echo pending. He currently denies diarrhea, nausea, vomiting or blood in urine.  Will admit to hospital medicine observation for further medical management.       Past Medical History:   Diagnosis Date    CHF (congestive heart failure) 2017    Coronary artery disease , 2013    s/p pci to rca (), LCx (2013)    DM (diabetes mellitus)     HLD (hyperlipidemia)     HTN (hypertension)     Kidney stone     EMELI (obstructive sleep apnea) 2015    Subclavian artery stenosis, left     Syncope and collapse 2023       Past Surgical History:   Procedure Laterality Date    BACK SURGERY      CHOLECYSTECTOMY      CORONARY ANGIOPLASTY      CORONARY ARTERY BYPASS GRAFT      5 bypass surgeries    SPINE SURGERY      TONSILLECTOMY         Review of patient's allergies indicates:   Allergen Reactions    Sulfa (sulfonamide antibiotics) Photosensitivity    Zocor [simvastatin] Other (See Comments)     lathargic       No current facility-administered medications on file prior to encounter.     Current Outpatient Medications on File Prior to Encounter   Medication Sig    aspirin 325 MG tablet Take 325 mg by mouth once daily.     clopidogreL (PLAVIX) 75 mg tablet Take 1 tablet (75 mg total) by mouth every evening.    COZAAR 50 mg tablet Take 50 mg by mouth once daily.    cyclobenzaprine (FLEXERIL) 10 MG tablet SMARTSI Tablet(s) By Mouth Every 12 Hours PRN    famotidine (PEPCID) 20 MG tablet Take 20 mg by mouth 2 (two)  "times daily.    metFORMIN (GLUCOPHAGE-XR) 750 MG ER 24hr tablet Take 750 mg by mouth 2 (two) times daily.    oxycodone-acetaminophen (PERCOCET)  mg per tablet Take 1 tablet by mouth every 6 (six) hours as needed for Pain.    pregabalin (LYRICA) 300 MG Cap 2 (two) times a day.    azelastine (ASTELIN) 137 mcg (0.1 %) nasal spray Two sprays each nostril twice daily, sniff full until absorbed, then follow with fluticasone nasal spray    BD ULTRA-FINE JERMAINE PEN NEEDLES 32 gauge x 5/32" Ndle     CYANOCOBALAMIN, VITAMIN B-12, (VITAMIN B-12 ORAL) Take by mouth.    EASY TOUCH 31 X 5/16 " Ndle     fluticasone (FLONASE) 50 mcg/actuation nasal spray Two sprays each nostril twice daily, use after spraying with nasal last in 1st    fluticasone propionate (FLONASE) 50 mcg/actuation nasal spray 1 spray (50 mcg total) by Each Nostril route once daily.    HUMALOG KWIKPEN INSULIN 100 unit/mL pen 12 Units 3 (three) times daily.    isosorbide mononitrate (IMDUR) 30 MG 24 hr tablet Take 1 tablet (30 mg total) by mouth once daily.    metoprolol tartrate (LOPRESSOR) 50 MG tablet TAKE ONE TABLET BY MOUTH TWICE A DAY    nitroGLYCERIN (NITROSTAT) 0.4 MG SL tablet Place 1 tab under the tongue for chest pain every 5 minutes x 3; if chest pain not relieved after 3 doses go to the ED    rosuvastatin (CRESTOR) 20 MG tablet Take 1 tablet (20 mg total) by mouth every evening.    SURE COMFORT INSULIN SYRINGE 1/2 mL 31 x 5/16" Syrg     TRESIBA FLEXTOUCH U-200 200 unit/mL (3 mL) InPn 64 Units once daily.      Family History       Problem Relation (Age of Onset)    Heart attack Mother, Father    Heart disease Mother, Father, Paternal Uncle    Hypertension Brother          Tobacco Use    Smoking status: Never    Smokeless tobacco: Never   Substance and Sexual Activity    Alcohol use: No     Alcohol/week: 0.0 standard drinks of alcohol    Drug use: No    Sexual activity: Yes     Partners: Female     Review of Systems "   Constitutional:  Positive for appetite change. Negative for chills and fever.   HENT:  Negative for congestion, postnasal drip and rhinorrhea.    Eyes:  Negative for visual disturbance.   Respiratory:  Negative for cough and shortness of breath.    Cardiovascular:  Negative for chest pain and palpitations.   Gastrointestinal:  Positive for diarrhea, nausea and vomiting. Negative for abdominal pain, blood in stool and constipation.   Genitourinary:  Positive for hematuria. Negative for difficulty urinating.   Musculoskeletal:  Positive for arthralgias and neck pain.   Skin:  Negative for color change and wound.   Neurological:  Negative for dizziness.   Hematological:  Does not bruise/bleed easily.   Psychiatric/Behavioral:  Negative for agitation.      Objective:     Vital Signs (Most Recent):  Temp: 97.6 °F (36.4 °C) (11/08/23 0112)  Pulse: 69 (11/08/23 0115)  Resp: 18 (11/08/23 0112)  BP: (!) 121/56 (11/08/23 0115)  SpO2: (!) 94 % (11/08/23 0112) Vital Signs (24h Range):  Temp:  [97.6 °F (36.4 °C)-98 °F (36.7 °C)] 97.6 °F (36.4 °C)  Pulse:  [57-70] 69  Resp:  [12-19] 18  SpO2:  [87 %-97 %] 94 %  BP: ()/(43-62) 121/56     Weight: 93.5 kg (206 lb 2.1 oz)  Body mass index is 27.96 kg/m².     Physical Exam  HENT:      Head: Normocephalic and atraumatic.      Nose: No congestion or rhinorrhea.      Mouth/Throat:      Mouth: Mucous membranes are moist.   Cardiovascular:      Rate and Rhythm: Normal rate.   Pulmonary:      Effort: No respiratory distress.      Breath sounds: Normal breath sounds.   Abdominal:      General: Bowel sounds are normal.      Palpations: Abdomen is soft.      Tenderness: There is no abdominal tenderness.   Musculoskeletal:      Cervical back: Normal range of motion.      Right lower leg: No edema.      Left lower leg: No edema.   Skin:     General: Skin is warm and dry.   Neurological:      Mental Status: He is alert and oriented to person, place, and time. Mental status is at baseline.    Psychiatric:         Mood and Affect: Mood normal.                Significant Labs: All pertinent labs within the past 24 hours have been reviewed.    Significant Imaging: I have reviewed all pertinent imaging results/findings within the past 24 hours.    Assessment/Plan:     * Syncope and collapse  Hold home lyrica, flexeril and oxycodone  H/H stable, audrey noted  CT of head-no acute findings  Continuous Cardiac monitoring  Repeat Orthostatic blood pressure pending  Neuro checks Q4hrs  Echo, carotid u/s, urinalysis, drug panel pending         Fall  Continue syncope workup  Fall precautions       Kidney stones  Reports he recently passed about 9 stones  Reports no acute issues, abdominal or flank pain  Urinalysis pending       Hematuria  Reported previously, likely 2/2 kidney stones he reports he recently passed  Urinalysis pending       Acute kidney injury superimposed on CKD  Baseline creatinine 1.8, currently 2.5  IVFs  Avoid nephrotoxic agents  Renally dose medications  Monitor labs       Essential hypertension  Normotensive  Hold all BP meds  Monitor vitals     Coronary artery disease involving native coronary artery of native heart without angina pectoris  Denies chest pain  Continue aspirin and plavix, rosuvastatin not on formulary     CHF (congestive heart failure)  No signs of decompensation  Echo pending  Daily weight      Type 2 diabetes mellitus with diabetic peripheral angiopathy without gangrene, with long-term current use of insulin  A1c pending  Treat with SSI ac/hs during her stay  Hold oral antihyperglycemic agents  Resume home regimen at discharge  Hypoglycemia protocol PRN        VTE Risk Mitigation (From admission, onward)         Ordered     IP VTE HIGH RISK PATIENT  Once         11/08/23 0120     Place sequential compression device  Until discontinued         11/08/23 0120                     On 11/08/2023, patient should be placed in hospital observation services under my care in  collaboration with Dr. Senior.          Tamika Garcia NP  Department of Hospital Medicine  [facility] - Telemetry

## 2023-11-08 NOTE — NURSING
Pt arrived to room from [place]. VSS. Pt asymptomatic. Tele placed and confirmed.    Orthostats taken and it was negative.  NP aware pt arrived to hospital.

## 2023-11-08 NOTE — PLAN OF CARE
11/08/23 0750   Discharge Planning   Assessment Type Discharge Planning Brief Assessment   Resource/Environmental Concerns none   Support Systems Spouse/significant other   Equipment Currently Used at Home none   Current Living Arrangements home   Patient/Family Anticipates Transition to home with family   Patient/Family Anticipated Services at Transition none   DME Needed Upon Discharge  none   Discharge Plan A Home with family

## 2023-11-08 NOTE — ASSESSMENT & PLAN NOTE
A1c pending  Treat with SSI ac/hs during her stay  Hold oral antihyperglycemic agents  Resume home regimen at discharge  Hypoglycemia protocol PRN

## 2023-11-08 NOTE — ASSESSMENT & PLAN NOTE
Reports he recently passed about 9 stones  Reports no acute issues, abdominal or flank pain  Urinalysis pending

## 2023-11-08 NOTE — SUBJECTIVE & OBJECTIVE
"Past Medical History:   Diagnosis Date    CHF (congestive heart failure) 2017    Coronary artery disease , 2013    s/p pci to rca (), LCx (2013)    DM (diabetes mellitus)     HLD (hyperlipidemia)     HTN (hypertension)     Kidney stone     EMELI (obstructive sleep apnea) 2015    Subclavian artery stenosis, left     Syncope and collapse 2023       Past Surgical History:   Procedure Laterality Date    BACK SURGERY      CHOLECYSTECTOMY      CORONARY ANGIOPLASTY      CORONARY ARTERY BYPASS GRAFT      5 bypass surgeries    SPINE SURGERY      TONSILLECTOMY         Review of patient's allergies indicates:   Allergen Reactions    Sulfa (sulfonamide antibiotics) Photosensitivity    Zocor [simvastatin] Other (See Comments)     lathargic       No current facility-administered medications on file prior to encounter.     Current Outpatient Medications on File Prior to Encounter   Medication Sig    aspirin 325 MG tablet Take 325 mg by mouth once daily.     clopidogreL (PLAVIX) 75 mg tablet Take 1 tablet (75 mg total) by mouth every evening.    COZAAR 50 mg tablet Take 50 mg by mouth once daily.    cyclobenzaprine (FLEXERIL) 10 MG tablet SMARTSI Tablet(s) By Mouth Every 12 Hours PRN    famotidine (PEPCID) 20 MG tablet Take 20 mg by mouth 2 (two) times daily.    metFORMIN (GLUCOPHAGE-XR) 750 MG ER 24hr tablet Take 750 mg by mouth 2 (two) times daily.    oxycodone-acetaminophen (PERCOCET)  mg per tablet Take 1 tablet by mouth every 6 (six) hours as needed for Pain.    pregabalin (LYRICA) 300 MG Cap 2 (two) times a day.    azelastine (ASTELIN) 137 mcg (0.1 %) nasal spray Two sprays each nostril twice daily, sniff full until absorbed, then follow with fluticasone nasal spray    BD ULTRA-FINE JERMAINE PEN NEEDLES 32 gauge x 5/32" Ndle     CYANOCOBALAMIN, VITAMIN B-12, (VITAMIN B-12 ORAL) Take by mouth.    EASY TOUCH 31 X 5/16 " Ndle     fluticasone (FLONASE) 50 mcg/actuation nasal spray Two " "sprays each nostril twice daily, use after spraying with nasal last in 1st    fluticasone propionate (FLONASE) 50 mcg/actuation nasal spray 1 spray (50 mcg total) by Each Nostril route once daily.    HUMALOG KWIKPEN INSULIN 100 unit/mL pen 12 Units 3 (three) times daily.    isosorbide mononitrate (IMDUR) 30 MG 24 hr tablet Take 1 tablet (30 mg total) by mouth once daily.    metoprolol tartrate (LOPRESSOR) 50 MG tablet TAKE ONE TABLET BY MOUTH TWICE A DAY    nitroGLYCERIN (NITROSTAT) 0.4 MG SL tablet Place 1 tab under the tongue for chest pain every 5 minutes x 3; if chest pain not relieved after 3 doses go to the ED    rosuvastatin (CRESTOR) 20 MG tablet Take 1 tablet (20 mg total) by mouth every evening.    SURE COMFORT INSULIN SYRINGE 1/2 mL 31 x 5/16" Syrg     TRESIBA FLEXTOUCH U-200 200 unit/mL (3 mL) InPn 64 Units once daily.      Family History       Problem Relation (Age of Onset)    Heart attack Mother, Father    Heart disease Mother, Father, Paternal Uncle    Hypertension Brother          Tobacco Use    Smoking status: Never    Smokeless tobacco: Never   Substance and Sexual Activity    Alcohol use: No     Alcohol/week: 0.0 standard drinks of alcohol    Drug use: No    Sexual activity: Yes     Partners: Female     Review of Systems   Constitutional:  Positive for appetite change. Negative for chills and fever.   HENT:  Negative for congestion, postnasal drip and rhinorrhea.    Eyes:  Negative for visual disturbance.   Respiratory:  Negative for cough and shortness of breath.    Cardiovascular:  Negative for chest pain and palpitations.   Gastrointestinal:  Positive for diarrhea, nausea and vomiting. Negative for abdominal pain, blood in stool and constipation.   Genitourinary:  Positive for hematuria. Negative for difficulty urinating.   Musculoskeletal:  Positive for arthralgias and neck pain.   Skin:  Negative for color change and wound.   Neurological:  Negative for dizziness.   Hematological:  Does not " bruise/bleed easily.   Psychiatric/Behavioral:  Negative for agitation.      Objective:     Vital Signs (Most Recent):  Temp: 97.6 °F (36.4 °C) (11/08/23 0112)  Pulse: 69 (11/08/23 0115)  Resp: 18 (11/08/23 0112)  BP: (!) 121/56 (11/08/23 0115)  SpO2: (!) 94 % (11/08/23 0112) Vital Signs (24h Range):  Temp:  [97.6 °F (36.4 °C)-98 °F (36.7 °C)] 97.6 °F (36.4 °C)  Pulse:  [57-70] 69  Resp:  [12-19] 18  SpO2:  [87 %-97 %] 94 %  BP: ()/(43-62) 121/56     Weight: 93.5 kg (206 lb 2.1 oz)  Body mass index is 27.96 kg/m².     Physical Exam  HENT:      Head: Normocephalic and atraumatic.      Nose: No congestion or rhinorrhea.      Mouth/Throat:      Mouth: Mucous membranes are moist.   Cardiovascular:      Rate and Rhythm: Normal rate.   Pulmonary:      Effort: No respiratory distress.      Breath sounds: Normal breath sounds.   Abdominal:      General: Bowel sounds are normal.      Palpations: Abdomen is soft.      Tenderness: There is no abdominal tenderness.   Musculoskeletal:      Cervical back: Normal range of motion.      Right lower leg: No edema.      Left lower leg: No edema.   Skin:     General: Skin is warm and dry.   Neurological:      Mental Status: He is alert and oriented to person, place, and time. Mental status is at baseline.   Psychiatric:         Mood and Affect: Mood normal.                Significant Labs: All pertinent labs within the past 24 hours have been reviewed.    Significant Imaging: I have reviewed all pertinent imaging results/findings within the past 24 hours.

## 2023-11-08 NOTE — ASSESSMENT & PLAN NOTE
Hold home lyrica, flexeril and oxycodone  H/H stable, audrey noted  CT of head-no acute findings  Continuous Cardiac monitoring  Repeat Orthostatic blood pressure pending  Neuro checks Q4hrs  Echo, carotid u/s, urinalysis, drug panel pending

## 2023-11-08 NOTE — NURSING
Patient arrived to floor from US. NAD noted. Safety precautions maintained. Plan of care ongoing.   Introduction Text (Please End With A Colon): The following procedure was deferred: Sasha Other Procedure: cosmetic treatment Reason To Defer Override: defer to  Detail Level: Detailed X Size Of Lesion In Cm (Optional): 0

## 2023-11-08 NOTE — ED PROVIDER NOTES
"Encounter Date: 11/7/2023    SCRIBE #1 NOTE: I, Yvrose Morgan, am scribing for, and in the presence of,  Kodi Mario MD. I have scribed the following portions of the note - Other sections scribed: HPI,ROS,PE.       History     Chief Complaint   Patient presents with    Loss of Consciousness     SYNCOPAL  EPISODE  AFTER LYING DOWN- N/V/D ONSET  YESTERDAY     Sherif Knutson Jr. is a 72 y.o. male, with a PMHx of CHF, CAD, DM, HLD, HTN and kidney stones, who presents to the ED with syncope, neck pain, decrease in appetite, diarrhea, nausea and vomiting onset 1 day ago. Patient reports associated symptom of hematuria that has alleviated PTA. Patient reports that for the past 3-4 days he has been passing kidney stones of multiple sizes(reports to have passed 9 stones). Patient states that he felt lightheaded after a nap and then experienced an episode of syncope while standing. Independent Historian: Patient's wife reports that the patient hit the wall and a door during the episode. Patient's wife reports that the patient had multiple episodes of emesis 1 day ago and denies seeing any blood or coffee colored vomit. Patient states that his neck pain makes it hard for him to hold his head up. Patient endorses daily compliance with Plavix, aspirin, cozaar, lopressor, lantus and Humalog. Patient denies the use of tobacco. Patient notes that he has not seen a urologist "in a while". No other exacerbating or alleviating factors. Denies fever, chest pain, SOB, leg swelling, bloody stool, back pain, headache or other associated symptoms.      The history is provided by the patient and the spouse. No  was used.     Review of patient's allergies indicates:   Allergen Reactions    Sulfa (sulfonamide antibiotics) Photosensitivity    Zocor [simvastatin] Other (See Comments)     lathargic     Past Medical History:   Diagnosis Date    CHF (congestive heart failure) 8/29/2017    Coronary artery disease 2006, " 5/2013    s/p pci to rca (2006), LCx (5/2013)    DM (diabetes mellitus)     HLD (hyperlipidemia)     HTN (hypertension)     Kidney stone     EMELI (obstructive sleep apnea) 11/2/2015    Subclavian artery stenosis, left     Syncope and collapse 11/8/2023     Past Surgical History:   Procedure Laterality Date    BACK SURGERY      CHOLECYSTECTOMY  2011    CORONARY ANGIOPLASTY      CORONARY ARTERY BYPASS GRAFT  2000    5 bypass surgeries    SPINE SURGERY  1980    TONSILLECTOMY       Family History   Problem Relation Age of Onset    Heart disease Mother     Heart attack Mother     Heart disease Father     Heart attack Father     Hypertension Brother     Heart disease Paternal Uncle     Prostate cancer Neg Hx     Kidney disease Neg Hx      Social History     Tobacco Use    Smoking status: Never    Smokeless tobacco: Never   Substance Use Topics    Alcohol use: No     Alcohol/week: 0.0 standard drinks of alcohol    Drug use: No     Review of Systems   Constitutional:  Positive for appetite change (decrease). Negative for chills and fever.   HENT:  Negative for facial swelling and sore throat.    Eyes:  Negative for visual disturbance.   Respiratory:  Negative for cough and shortness of breath.    Cardiovascular:  Negative for chest pain, palpitations and leg swelling.   Gastrointestinal:  Positive for diarrhea, nausea and vomiting. Negative for abdominal pain and blood in stool.   Genitourinary:  Negative for dysuria and hematuria.   Musculoskeletal:  Positive for neck pain. Negative for back pain.   Skin:  Negative for rash.   Neurological:  Positive for syncope. Negative for weakness and headaches.   Hematological:  Does not bruise/bleed easily.   Psychiatric/Behavioral: Negative.         Physical Exam     Initial Vitals [11/07/23 2158]   BP Pulse Resp Temp SpO2   (!) 68/43 68 16 97.6 °F (36.4 °C) 96 %      MAP       --         Physical Exam    Nursing note and vitals reviewed.  Constitutional: He appears well-developed  and well-nourished. He is not diaphoretic. No distress.   HENT:   Head: Normocephalic and atraumatic.   Right Ear: External ear normal.   Left Ear: External ear normal.   Nose: Nose normal.   Eyes: Conjunctivae and EOM are normal. Pupils are equal, round, and reactive to light. No scleral icterus.   No nystagmus   Neck: Neck supple. No tracheal deviation present.   Cardiovascular:  Normal rate, regular rhythm and normal heart sounds.     Exam reveals no gallop and no friction rub.       No murmur heard.  Pulses:       Dorsalis pedis pulses are 2+ on the right side and 2+ on the left side.   Pulmonary/Chest: Breath sounds normal. No respiratory distress.   Abdominal: Abdomen is soft. Bowel sounds are normal. There is no abdominal tenderness.   Musculoskeletal:      Cervical back: Neck supple. Tenderness present. No deformity.      Thoracic back: No deformity or tenderness.      Lumbar back: No deformity.      Right lower leg: No edema.      Left lower leg: No edema.      Comments: Midline tenderness to upper cervical spine. No step offs.     Neurological: He is alert and oriented to person, place, and time. He has normal strength. No cranial nerve deficit or sensory deficit. GCS score is 15. GCS eye subscore is 4. GCS verbal subscore is 5. GCS motor subscore is 6.   Moves all extremities and carries on conversation. CN- II: PERRL; III/IV/VI: EOMI w/out evidence of nystagmus; V: no deficits appreciated to light touch bilateral face; VII: no facial weakness, no facial asymmetry. Eyebrow raise symmetric. Smile symmetric; IX/X: palate midline, and raises symmetrically; XI: shoulder shrug 5/5 bilaterally; XII: tongue is midline w/out asymmetry. Strength 5/5 to bilateral upper and lower extremities, sensation intact to light touch.    Skin: Skin is warm and dry.   Psychiatric: He has a normal mood and affect. Thought content normal.         ED Course   Critical Care    Date/Time: 11/24/2023 4:26 AM    Performed by:  Kodi Mario MD  Authorized by: Lamar Daley MD  Direct patient critical care time: 23 minutes  Additional history critical care time: 5 minutes  Ordering / reviewing critical care time: 5 minutes  Documentation critical care time: 5 minutes  Consulting other physicians critical care time: 5 minutes  Total critical care time (exclusive of procedural time) : 43 minutes  Critical care time was exclusive of teaching time and separately billable procedures and treating other patients.  Critical care was necessary to treat or prevent imminent or life-threatening deterioration of the following conditions: renal failure.  Critical care was time spent personally by me on the following activities: review of old charts, re-evaluation of patient's condition, pulse oximetry, ordering and review of radiographic studies, ordering and review of laboratory studies, ordering and performing treatments and interventions, obtaining history from patient or surrogate, examination of patient, evaluation of patient's response to treatment, discussions with primary provider, discussions with consultants and development of treatment plan with patient or surrogate.        Labs Reviewed   POCT URINALYSIS W/O SCOPE - Abnormal; Notable for the following components:       Result Value    Bilirubin, UA 1+ (*)     Ketones, UA 1+ (*)     Blood, UA 2+ (*)     Protein, UA 3+ (*)     All other components within normal limits   POCT GLUCOSE - Abnormal; Notable for the following components:    POCT Glucose 169 (*)     All other components within normal limits   POCT CMP - Abnormal; Notable for the following components:    POC BUN 28 (*)     POC Creatinine 2.6 (*)     POC Glucose 171 (*)     Bilirubin, POC 1.7 (*)     All other components within normal limits   ISTAT PROCEDURE - Abnormal; Notable for the following components:    POC PTWBT 20.4 (*)     POC PTINR 1.7 (*)     All other components within normal limits   MAGNESIUM   TROPONIN  ISTAT   POCT CBC   POCT CMP   POCT PROTIME-INR   POCT TROPONIN          Imaging Results              X-Ray Chest AP Portable (Final result)  Result time 11/07/23 23:17:53      Final result by Jean Vines MD (11/07/23 23:17:53)                   Impression:      No acute process.      Electronically signed by: Jean Vines MD  Date:    11/07/2023  Time:    23:17               Narrative:    EXAMINATION:  XR CHEST AP PORTABLE    CLINICAL HISTORY:  syncope;    TECHNIQUE:  Single frontal view of the chest was performed.    COMPARISON:  07/30/2017.    FINDINGS:  Monitoring EKG leads are present.  There are postoperative changes of median sternotomy.  The sternal wires are intact.  The trachea is unremarkable.  The cardiomediastinal silhouette is stable.  There is no evidence of free air beneath the hemidiaphragms.  There are no pleural effusions.  There is no evidence of a pneumothorax.  There is no evidence of pneumomediastinum.  No airspace opacity is present.  There are degenerative changes in the osseous structures.                                       CT Head Without Contrast (Final result)  Result time 11/07/23 23:42:38      Final result by Funmilayo Cadena MD (11/07/23 23:42:38)                   Impression:      No acute intracranial abnormality detected.    No acute cervical fracture.  Spondylitic changes.  Straightening of the normal cervical lordosis.      Electronically signed by: Funmilayo Cadena  Date:    11/07/2023  Time:    23:42               Narrative:    EXAMINATION:  CT OF THE HEAD WITHOUT AND CT CERVICAL SPINE    CLINICAL HISTORY:  Syncope, recurrent;; Neck trauma (Age >= 65y);    TECHNIQUE:  5 mm unenhanced axial images were obtained from the skull base to the vertex.    COMPARISON:  10/06/2014 and CT cervical spine 10/02/2018    FINDINGS:  CT head: Mild cerebral atrophy and chronic small vessel ischemic changes are present.  There is no acute intracranial hemorrhage, territorial infarct or  mass effect, or midline shift. The visualized paranasal sinuses and mastoid air cells are clear.    CT cervical spine: There is straightening of the normal cervical lordosis, which may be due to muscular spasm or the presence of a cervical neck collar.  There is no acute fracture.  There is grade 1 retrolisthesis of C3 on C4, C4 on C5 and C6 on C7.  Moderate to severe spondylitic changes are seen from the mid to lower cervical spine.  A subclavian vascular stent is seen.  Calcifications are seen at the carotid bulbs.  There are ground-glass opacities at the lung apices.  There is a 3.5 x 2.3 cm ovoid cystic lesion in the left paramedian neck just below the skin surface.  Question sebaceous cyst.                                       CT Cervical Spine Without Contrast (Final result)  Result time 11/07/23 23:42:38      Final result by Funmilayo Cadena MD (11/07/23 23:42:38)                   Impression:      No acute intracranial abnormality detected.    No acute cervical fracture.  Spondylitic changes.  Straightening of the normal cervical lordosis.      Electronically signed by: Funmilayo Cadena  Date:    11/07/2023  Time:    23:42               Narrative:    EXAMINATION:  CT OF THE HEAD WITHOUT AND CT CERVICAL SPINE    CLINICAL HISTORY:  Syncope, recurrent;; Neck trauma (Age >= 65y);    TECHNIQUE:  5 mm unenhanced axial images were obtained from the skull base to the vertex.    COMPARISON:  10/06/2014 and CT cervical spine 10/02/2018    FINDINGS:  CT head: Mild cerebral atrophy and chronic small vessel ischemic changes are present.  There is no acute intracranial hemorrhage, territorial infarct or mass effect, or midline shift. The visualized paranasal sinuses and mastoid air cells are clear.    CT cervical spine: There is straightening of the normal cervical lordosis, which may be due to muscular spasm or the presence of a cervical neck collar.  There is no acute fracture.  There is grade 1 retrolisthesis of C3  on C4, C4 on C5 and C6 on C7.  Moderate to severe spondylitic changes are seen from the mid to lower cervical spine.  A subclavian vascular stent is seen.  Calcifications are seen at the carotid bulbs.  There are ground-glass opacities at the lung apices.  There is a 3.5 x 2.3 cm ovoid cystic lesion in the left paramedian neck just below the skin surface.  Question sebaceous cyst.                                       Medications   sodium chloride 0.9% bolus 1,000 mL 1,000 mL (0 mLs Intravenous Stopped 11/7/23 2236)   sodium chloride 0.9% bolus 500 mL 500 mL (0 mLs Intravenous Stopped 11/7/23 2349)     Medical Decision Making  Amount and/or Complexity of Data Reviewed  Labs: ordered.  Radiology: ordered.            Scribe Attestation:   Scribe #1: I performed the above scribed service and the documentation accurately describes the services I performed. I attest to the accuracy of the note.        ED Course as of 11/24/23 0426   Tue Nov 07, 2023   2231 EKG: Rate 71, regular rhythm, sinus rhythm, premature atrial complexes noted, first-degree AV block, other intervals within normal limits, left axis deviation, LVH noted, repolarization abnormalities noted, QRS is 116, nonspecific T-wave abnormalities, interpreted by me, reviewed by me. [CC]   2232 CBC without leukocytosis, leukopenia, anemia, polycythemia, thrombocytosis, thrombocytopenia.   [CC]   Wed Nov 08, 2023   0003 I spoke with Hospital mid-level for placement in observation. [CC]   0351 Patient presenting with hypotension, syncopal episode and collapse.  CT the head is negative for any acute intracranial abnormalities.  No acute cervical fracture subluxation noted on CT.  C-collar cleared at bedside.  Chest x-ray is unremarkable.  I believe patient likely orthostatic.  Orthostatic vitals noted in the emergency department.  Patient's history of CHF.  He received 1500 cc of fluid in the emergency department.  Given continued symptoms and continued hypotension I  believe patient requires further soft fluid resuscitation while being monitored.  Plan to place patient in the observation unit.  EKG is nonischemic, troponin normal, no chest pain, doubt ACS.  MIKE noted, creatinine 2.6.  Electrolytes within normal limits.  Plan to transfer patient to [facility] for further workup and treatment. [CC]      ED Course User Index  [CC] Kodi Mario MD          I, Kodi Mario MD, personally performed the services described in this documentation.  All medical record entries made by the scribe were at my direction and in my presence.  I have reviewed the chart and agree that the record reflects my personal performance and is accurate and complete.            Clinical Impression:   Final diagnoses:  [R55] Syncope and collapse  [E86.0] Dehydration  [N17.9] MIKE (acute kidney injury)        ED Disposition Condition    Observation Stable                Kodi Mario MD  11/08/23 0353       Kodi Mario MD  11/24/23 0426

## 2023-11-08 NOTE — ASSESSMENT & PLAN NOTE
Baseline creatinine 1.8, currently 2.5  IVFs  Avoid nephrotoxic agents  Renally dose medications  Monitor labs      09-Nov-2019 15:06

## 2023-11-08 NOTE — ASSESSMENT & PLAN NOTE
Reported previously, likely 2/2 kidney stones he reports he recently passed  Urinalysis pending

## 2023-11-08 NOTE — HPI
"72 y.o. male, with a PMHx of CHF, CAD, DM, HLD, HTN and kidney stones, who presents to the ED with syncope, neck pain, decrease in appetite, diarrhea, nausea and vomiting, onset 1 day ago. Patient reports associated symptom of hematuria that has resolved PTA. Patient reports he passed multiple kidney stones, Sunday and Monday, hematuria likely 2/2 kidney stones. Patient states, on yesterday, he felt lightheaded after a nap, and then experienced an episode of syncope while standing. Independent Historian: Patient's wife reports that the patient hit the wall and a door during the episode. Patient's wife reports that the patient had multiple episodes of emesis 1 day ago and denies seeing any blood or coffee colored vomit. Patient states that his neck pain, 2/2 fall, makes it hard for him to hold his head up. Spouse reports similar past episodes of syncope, about a year ago, 2/2 low blood pressure. She reports BP meds have been adjusted multiple times since then. He endorses daily compliance with Plavix, aspirin, cozaar, lopressor, isosorbide, lantus and Humalog. Patient denies the use of tobacco, Illicit drugs or alcohol. He follows with nephrology but has not had an appointment since 9/2022.  Patient notes that he has not seen a urologist "in a while," also. He was suppose to have a follow up appointment with PCP on Monday but did not make it because he was not feeling well. No other exacerbating or alleviating factors. Denies fever, chest pain, SOB, leg swelling, bloody stool, back pain, abdominal pain, flank pain, headaches or other associated symptoms.  Work up revealed CBC unremarkable, glucose 129, creatinine at 2.5(according to last nephrology note in 2022, baseline creatine is 1.8), bilirubin 1.3. CT of cervical spine and head without acute findings.  Urinalysis, carotid ultrasound, drug screen and echo pending. He currently denies diarrhea, nausea, vomiting or blood in urine.  Will admit to hospital medicine " observation for further medical management.
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
## 2023-11-09 LAB — POCT GLUCOSE: 117 MG/DL (ref 70–110)

## 2023-11-09 NOTE — HOSPITAL COURSE
Syncope work up done. No acute pathology. His Cr improved with IVFs. He remained hemodynamically stable. He was cleared for discharge.

## 2023-11-09 NOTE — ASSESSMENT & PLAN NOTE
Not fluid overloaded. He does endorse a hx of CHF, last echo did not have evidence of this, but this current echo with DD and +shunt  He will follow up with cardiology.

## 2023-11-09 NOTE — NURSING
AVS virtually reviewed with patient and his wife in its entirety with emphasis on medications, follow-up appointments and reasons to return to the ED or contact the [facility]. Patient also encouraged to utilize their patient portal. Ease and convenience of use reiterated. Education complete and patient voiced understanding. All questions answered. Discharge teaching complete.

## 2023-11-09 NOTE — ASSESSMENT & PLAN NOTE
Has some polypharmacy; lyrica, flexeril and oxycodone  CT of head-no acute findings  Carotids with no occlusive disease  OP fu with cards

## 2023-11-09 NOTE — DISCHARGE SUMMARY
"[facility] Medicine  Discharge Summary      Patient Name: Sherif Knutson Jr.  MRN: [id number]  GREG: [id number]  Patient Class: OP- Observation  Admission Date: 11/7/2023  Hospital Length of Stay: 0 days  Discharge Date and Time: 11/8/2023  6:41 PM  Discharging Provider: Lamar Daley MD  Primary Care Provider: Bi Hollins DO    Primary Care Team: Networked reference to record PCT     HPI:   72 y.o. male, with a PMHx of CHF, CAD, DM, HLD, HTN and kidney stones, who presents to the ED with syncope, neck pain, decrease in appetite, diarrhea, nausea and vomiting, onset 1 day ago. Patient reports associated symptom of hematuria that has resolved PTA. Patient reports he passed multiple kidney stones, Sunday and Monday, hematuria likely 2/2 kidney stones. Patient states, on yesterday, he felt lightheaded after a nap, and then experienced an episode of syncope while standing. Independent Historian: Patient's wife reports that the patient hit the wall and a door during the episode. Patient's wife reports that the patient had multiple episodes of emesis 1 day ago and denies seeing any blood or coffee colored vomit. Patient states that his neck pain, 2/2 fall, makes it hard for him to hold his head up. Spouse reports similar past episodes of syncope, about a year ago, 2/2 low blood pressure. She reports BP meds have been adjusted multiple times since then. He endorses daily compliance with Plavix, aspirin, cozaar, lopressor, isosorbide, lantus and Humalog. Patient denies the use of tobacco, Illicit drugs or alcohol. He follows with nephrology but has not had an appointment since 9/2022.  Patient notes that he has not seen a urologist "in a while," also. He was suppose to have a follow up appointment with PCP on Monday but did not make it because he was not feeling well. No other exacerbating or alleviating factors. Denies fever, chest pain, SOB, leg swelling, bloody stool, back pain, abdominal pain, " flank pain, headaches or other associated symptoms.  Work up revealed CBC unremarkable, glucose 129, creatinine at 2.5(according to last nephrology note in 2022, baseline creatine is 1.8), bilirubin 1.3. CT of cervical spine and head without acute findings.  Urinalysis, carotid ultrasound, drug screen and echo pending. He currently denies diarrhea, nausea, vomiting or blood in urine.  Will admit to hospital medicine observation for further medical management.       Hospital Course:   Syncope work up done. No acute pathology. His Cr improved with IVFs. He remained hemodynamically stable. He was cleared for discharge.        Consults: none    Cardiac/Vascular  Essential hypertension  Stable  Fu with PCP    Coronary artery disease involving native coronary artery of native heart without angina pectoris  Continue aspirin and plavix, rosuvastatin      CHF (congestive heart failure)  Not fluid overloaded. He does endorse a hx of CHF, last echo did not have evidence of this, but this current echo with DD and +shunt  He will follow up with cardiology.       Renal/  Kidney stones  History of  Continue OP fu with urology      Hematuria  12 cells on UA  Fu with urology      Acute kidney injury superimposed on CKD  MIKE on CKD III  Improved with IVFs      Endocrine  Type 2 diabetes mellitus with diabetic peripheral angiopathy without gangrene, with long-term current use of insulin  Stable       Orthopedic  Fall-resolved as of 11/9/2023  Continue syncope workup  Fall precautions       Other  * Syncope and collapse  Has some polypharmacy; lyrica, flexeril and oxycodone  CT of head-no acute findings  Carotids with no occlusive disease  OP fu with cards      Final Active Diagnoses:    Diagnosis Date Noted POA    PRINCIPAL PROBLEM:  Syncope and collapse [R55] 11/08/2023 Yes    Acute kidney injury superimposed on CKD [N17.9, N18.9] 11/08/2023 Yes    Hematuria [R31.9] 11/08/2023 Yes    Kidney stones [N20.0] 11/08/2023 Yes     Essential hypertension [I10] 06/30/2020 Yes    Coronary artery disease involving native coronary artery of native heart without angina pectoris [I25.10] 05/06/2018 Yes    CHF (congestive heart failure) [I50.9] 08/29/2017 Yes    Type 2 diabetes mellitus with diabetic peripheral angiopathy without gangrene, with long-term current use of insulin [E11.51, Z79.4] 06/28/2014 Not Applicable      Problems Resolved During this Admission:    Diagnosis Date Noted Date Resolved POA    Fall [W19.XXXA] 11/08/2023 11/09/2023 Yes       Discharged Condition: stable    Disposition: Home or Self Care    Follow Up:   Follow-up Information     Bi Hollins,  Follow up in 5 day(s).    Specialty: Family Medicine  Why: repeat labs  Contact information:  [address] [place] [id number] [phone]                       Patient Instructions:      Diet Cardiac     Activity as tolerated       Significant Diagnostic Studies: Cardiac Graphics: Echocardiogram:   Transthoracic echo (TTE) complete (Cupid Only):   Results for orders placed or performed during the hospital encounter of 11/07/23   Echo Saline Bubble? Yes   Result Value Ref Range    BSA 2.18 m2    LVOT stroke volume 67.63 cm3    LVIDd 4.54 3.5 - 6.0 cm    LV Systolic Volume 25.80 mL    LV Systolic Volume Index 11.9 mL/m2    LVIDs 2.65 2.1 - 4.0 cm    LV Diastolic Volume 94.50 mL    LV Diastolic Volume Index 43.75 mL/m2    IVS 1.28 (A) 0.6 - 1.1 cm    LVOT diameter 2.05 cm    LVOT area 3.3 cm2    FS 42 28 - 44 %    Left Ventricle Relative Wall Thickness 0.44 cm    Posterior Wall 1.00 0.6 - 1.1 cm    LV mass 186.67 g    LV Mass Index 86 g/m2    MV Peak E Rica 0.82 m/s    TDI LATERAL 0.11 m/s    TDI SEPTAL 0.06 m/s    E/E' ratio 9.65 m/s    MV Peak A Rica 0.89 m/s    TR Max Rica 1.69 m/s    E/A ratio 0.92     IVRT 95.15 msec    E wave deceleration time 184.21 msec    LV SEPTAL E/E' RATIO 13.67 m/s    LV LATERAL E/E' RATIO 7.45 m/s    LVOT peak rica 0.95 m/s    Left  Ventricular Outflow Tract Mean Velocity 0.67 cm/s    Left Ventricular Outflow Tract Mean Gradient 2.00 mmHg    RVDD 3.99 cm    RV S' 15.24 cm/s    TAPSE 1.57 cm    LA size 4.91 cm    Left Atrium Minor Axis 5.45 cm    Left Atrium Major Axis 5.41 cm    RA Major Axis 5.03 cm    AV mean gradient 4 mmHg    AV peak gradient 6 mmHg    Ao peak rica 1.19 m/s    Ao VTI 24.40 cm    LVOT peak VTI 20.50 cm    AV valve area 2.77 cm²    AV Velocity Ratio 0.80     AV index (prosthetic) 0.84     LANG by Velocity Ratio 2.63 cm²    MV mean gradient 1 mmHg    MV peak gradient 3 mmHg    MV stenosis pressure 1/2 time 53.42 ms    MV valve area p 1/2 method 4.12 cm2    MV valve area by continuity eq 2.90 cm2    MV VTI 23.3 cm    Triscuspid Valve Regurgitation Peak Gradient 11 mmHg    PV PEAK VELOCITY 1.51 m/s    PV peak gradient 9 mmHg    Sinus 3.12 cm    STJ 2.32 cm    Ascending aorta 2.99 cm    IVC diameter 2.12 cm    Mean e' 0.09 m/s    ZLVIDS -3.85     ZLVIDD -4.44     LA Volume Index 38.8 mL/m2    LA volume 83.85 cm3    LA WIDTH 3.7 cm    RA Width 4.3 cm    TV resting pulmonary artery pressure 14 mmHg    RV TB RVSP 5 mmHg    Est. RA pres 3 mmHg    Narrative      Left Ventricle: Normal wall motion. There is normal systolic function   with a visually estimated ejection fraction of 65 - 70%. Grade I diastolic   dysfunction.    Right Ventricle: Normal right ventricular cavity size. Systolic   function is normal.    Left Atrium: Left atrium is moderately dilated.    Right Atrium: Right atrium is mildly dilated.    Tricuspid Valve: There is mild regurgitation.    Pulmonary Artery: The estimated pulmonary artery systolic pressure is   14 mmHg.    IVC/SVC: Normal venous pressure at 3 mmHg.    Bubble study positive for right-to-left shunt         Pending Diagnostic Studies:     None         Medications:  Reconciled Home Medications:      Medication List      CONTINUE taking these medications    aspirin 81 MG EC tablet  Commonly known as:  "ECOTRIN  Take 81 mg by mouth once daily.     azelastine 137 mcg (0.1 %) nasal spray  Commonly known as: ASTELIN  Two sprays each nostril twice daily, sniff full until absorbed, then follow with fluticasone nasal spray     BD ULTRA-FINE JERMAINE PEN NEEDLE 32 gauge x 5/32" Ndle  Generic drug: pen needle, diabetic     clopidogreL 75 mg tablet  Commonly known as: PLAVIX  Take 1 tablet (75 mg total) by mouth every evening.     COZAAR 50 MG tablet  Generic drug: losartan  Take 50 mg by mouth once daily.     EASY TOUCH 31 gauge x 5/16" Ndle  Generic drug: pen needle, diabetic     famotidine 20 MG tablet  Commonly known as: PEPCID  Take 20 mg by mouth 2 (two) times daily.     LANTUS SOLOSTAR U-100 INSULIN SUBQ  Inject 30 Units into the skin once daily.     metFORMIN 750 MG ER 24hr tablet  Commonly known as: GLUCOPHAGE-XR  Take 750 mg by mouth 2 (two) times daily.     metoprolol tartrate 50 MG tablet  Commonly known as: LOPRESSOR  TAKE ONE TABLET BY MOUTH TWICE A DAY     nitroGLYCERIN 0.4 MG SL tablet  Commonly known as: NITROSTAT  Place 1 tab under the tongue for chest pain every 5 minutes x 3; if chest pain not relieved after 3 doses go to the ED     oxyCODONE-acetaminophen  mg per tablet  Commonly known as: PERCOCET  Take 1 tablet by mouth every 6 (six) hours as needed for Pain.     pregabalin 300 MG Cap  Commonly known as: LYRICA  2 (two) times a day.     rosuvastatin 20 MG tablet  Commonly known as: CRESTOR  Take 1 tablet (20 mg total) by mouth every evening.     SURE COMFORT INSULIN SYRINGE 0.5 mL 31 gauge x 5/16" Syrg  Generic drug: insulin syringe-needle U-100     VITAMIN B-12 ORAL  Take by mouth.        STOP taking these medications    cyclobenzaprine 10 MG tablet  Commonly known as: FLEXERIL     fluticasone propionate 50 mcg/actuation nasal spray  Commonly known as: FLONASE     isosorbide mononitrate 30 MG 24 hr tablet  Commonly known as: IMDUR            Indwelling Lines/Drains at time of discharge: "   Lines/Drains/Airways     None                 Time spent on the discharge of patient: 31 minutes   Patient examined at bedside on day of discharge. Exam findings stable. he was deemed safe for discharge.       Lamar Daley MD  Department of Hospital Medicine  [facility]

## 2024-02-12 PROBLEM — N17.9 ACUTE KIDNEY INJURY SUPERIMPOSED ON CKD: Status: RESOLVED | Noted: 2023-11-08 | Resolved: 2024-02-12

## 2024-02-12 PROBLEM — N18.9 ACUTE KIDNEY INJURY SUPERIMPOSED ON CKD: Status: RESOLVED | Noted: 2023-11-08 | Resolved: 2024-02-12

## 2024-10-01 ENCOUNTER — PATIENT MESSAGE (OUTPATIENT)
Dept: CARDIOLOGY | Facility: CLINIC | Age: 74
End: 2024-10-01

## 2024-10-25 ENCOUNTER — TELEPHONE (OUTPATIENT)
Dept: OTOLARYNGOLOGY | Facility: CLINIC | Age: 74
End: 2024-10-25

## 2025-04-30 DIAGNOSIS — I95.9 HYPOTENSION, UNSPECIFIED: Primary | ICD-10-CM

## 2025-05-22 ENCOUNTER — OFFICE VISIT (OUTPATIENT)
Dept: CARDIOLOGY | Facility: CLINIC | Age: 75
End: 2025-05-22
Payer: MEDICARE

## 2025-05-22 VITALS
HEART RATE: 82 BPM | DIASTOLIC BLOOD PRESSURE: 62 MMHG | BODY MASS INDEX: 28.31 KG/M2 | SYSTOLIC BLOOD PRESSURE: 110 MMHG | RESPIRATION RATE: 18 BRPM | WEIGHT: 209 LBS | HEIGHT: 72 IN | OXYGEN SATURATION: 96 %

## 2025-05-22 DIAGNOSIS — R55 SYNCOPE AND COLLAPSE: ICD-10-CM

## 2025-05-22 DIAGNOSIS — I95.9 HYPOTENSION, UNSPECIFIED: ICD-10-CM

## 2025-05-22 DIAGNOSIS — I10 ESSENTIAL HYPERTENSION: ICD-10-CM

## 2025-05-22 DIAGNOSIS — Z95.1 S/P CABG X 5: ICD-10-CM

## 2025-05-22 DIAGNOSIS — E78.5 DYSLIPIDEMIA: ICD-10-CM

## 2025-05-22 DIAGNOSIS — Z98.61 POST PTCA: Chronic | ICD-10-CM

## 2025-05-22 DIAGNOSIS — I25.718 ATHEROSCLEROSIS OF AUTOLOGOUS VEIN CORONARY ARTERY BYPASS GRAFT WITH STABLE ANGINA PECTORIS: ICD-10-CM

## 2025-05-22 DIAGNOSIS — I50.9 CONGESTIVE HEART FAILURE, UNSPECIFIED HF CHRONICITY, UNSPECIFIED HEART FAILURE TYPE: ICD-10-CM

## 2025-05-22 DIAGNOSIS — Z95.820 S/P PERIPHERAL ARTERY ANGIOPLASTY WITH STENT PLACEMENT: ICD-10-CM

## 2025-05-22 DIAGNOSIS — E66.2 MORBID (SEVERE) OBESITY WITH ALVEOLAR HYPOVENTILATION: Primary | ICD-10-CM

## 2025-05-22 DIAGNOSIS — I25.10 CORONARY ARTERY DISEASE INVOLVING NATIVE CORONARY ARTERY OF NATIVE HEART WITHOUT ANGINA PECTORIS: ICD-10-CM

## 2025-05-22 LAB
OHS QRS DURATION: 116 MS
OHS QTC CALCULATION: 465 MS

## 2025-05-22 PROCEDURE — 99999 PR PBB SHADOW E&M-EST. PATIENT-LVL IV: CPT | Mod: PBBFAC,,, | Performed by: INTERNAL MEDICINE

## 2025-05-22 RX ORDER — METOPROLOL SUCCINATE 25 MG/1
25 TABLET, EXTENDED RELEASE ORAL DAILY
Qty: 90 TABLET | Refills: 3 | Status: SHIPPED | OUTPATIENT
Start: 2025-05-22

## 2025-05-22 NOTE — PROGRESS NOTES
CARDIOVASCULAR CONSULTATION    REASON FOR CONSULT:   Sherif Knutson Jr. is a 74 y.o. male who presents for No chief complaint on file.       Referred by:  Order, Paper  No address on file    Sherif Knutson Jr. is a 71 y.o. with CAD s/p CABG x5 in 2000 (patent LIMA-LAD, and known occlusions of SVG-D1, SVG-OM, SVG-RCA), w/ subsequent PCIs to RCA (2006), LCx (2013), and OM4 (2017), hx left subclavian artery stent, HTN, HLD, DM2       HISTORY OF PRESENT ILLNESS:     History of Present Illness    CHIEF COMPLAINT:  Sherif presents today to establish care with a new cardiologist and obtain cardiac clearance for upcoming surgery.    CARDIOVASCULAR:  He has history of CABG in 2005, followed by placement of three stents, including one in the subclavian artery. Stress test in January 2025 was mildly abnormal. He reports dyspnea with physical exertion and when lying flat. Symptoms have remained stable over the past 1 year without worsening.    MEDICATIONS:  He currently takes Metoprolol 25 mg daily (reduced from previous doses of 50 mg twice daily and maximum dose of 100 mg), Plavix, and Aspirin.    LABS:  Hemoglobin A1C has been trending down from 11 to 9.9, and most recently to 8.5 on yesterday's labs. He attributes this improvement to watching his diet more closely.    SOCIAL HISTORY:  He experienced the loss of his wife 3 months ago, which led to a period of neglecting his self-care.         Results for orders placed or performed in visit on 11/07/23   EKG 12-lead    Collection Time: 11/07/23  9:58 PM    Narrative    Test Reason :     Vent. Rate : 071 BPM     Atrial Rate : 071 BPM     P-R Int : 220 ms          QRS Dur : 116 ms      QT Int : 416 ms       P-R-T Axes : 032 -49 163 degrees     QTc Int : 452 ms    Sinus rhythm with 1st degree A-V block with Premature atrial complexes  Left axis deviation  LVH with QRS widening and repolarization abnormality ( R in aVL , Mark  product )  Abnormal ECG  When compared with ECG of  27-MAY-2022 07:17,  Previous ECG has undetermined rhythm, needs review  ST now depressed in Lateral leads  T wave inversion less evident in Inferior leads  Confirmed by Rabia NAVARRETE, Tejinder ALSTON ([id number]) on 11/9/2023 8:07:00 PM    Referred By: JACKIE   SELF           Confirmed By:Tejinder Camarillo MD          ECHO    Results for orders placed during the hospital encounter of 11/07/23    Echo Saline Bubble? Yes    Interpretation Summary    Left Ventricle: Normal wall motion. There is normal systolic function with a visually estimated ejection fraction of 65 - 70%. Grade I diastolic dysfunction.    Right Ventricle: Normal right ventricular cavity size. Systolic function is normal.    Left Atrium: Left atrium is moderately dilated.    Right Atrium: Right atrium is mildly dilated.    Tricuspid Valve: There is mild regurgitation.    Pulmonary Artery: The estimated pulmonary artery systolic pressure is 14 mmHg.    IVC/SVC: Normal venous pressure at 3 mmHg.    Bubble study positive for right-to-left shunt      STRESS TEST 2025 [facility]:    [facility]   Myocardial perfusion imaging is mildly abnormal.    There is a small area of infarction in the Anterolateral region.    Mildly reduced left ventricular systolic function.    Abnormal stress ECG.    Ischemia- Negative with baseine abnormalities   Arrythmia- Negative         CATH    Results for orders placed during the hospital encounter of 04/03/18    Cath lab procedure    Narrative  Date of Procedure:  04/03/2018    A. Indication/Pre-Operative Diagnosis    The patient is a 67 year old male that was referred for catheterization by Dr. Jose F García for abnormal CV function study (Intermediate risk) and angina (CCS III).    B. Summary/Post-Operative Diagnosis    LVEDP=18mmHg.   of mid LAD with diffuse disease of small caliber diagonals.  Patent LIMA to LAD.  Patent left subclavian artery stent.  Patent OM4 stent; 60% stenosis of OM4 distal to stent with FFR=0.89.  60% proximal LCx with  FFR=0.94.    CNela HPI    I have reviewed the history and physical completed on 04/02/2018. The patient has been examined and I concur with the findings from 04/02/2018.    Patient history was obtained from the patient.    Counseling: The patient was counseled regarding the potential risks and benefits of this procedure, as well as possible alternative approaches to the problem and gave informed consent.    The ASA Score was Class III.    [facility] Risk Score for MACE is 1.80%. [facility] Risk Score for Death is 0.20%.    Height: 72 in.      Weight: 205 lbs.      BMI: 27.80 kg/m2    OUTPATIENT MEDICATIONS: Medications were reviewed.  ALLERGIES: Allergies were reviewed.    Laboratory data revealed:    04/03/2018 CREAT  1.4, GLU  245, GRPE  O NEG, HCT  43.6, HGB  14.5, K  3.9, NA  135, PLT  160, WBCIR  6.01, BUN  19            D. Hemodynamic Results    LVEDP (Pre): 17 mmHg    CONDITION 1 (4/3/2018 10:17:38):  AO: 123/67 (85)  LVEDP: 17    E. Angiographic Results    Diagnostic:    Patient has a right dominant coronary artery.    - Left Main Coronary Artery:  The LM has a 10% stenosis. There is ABBY 3 flow. The remaining portion of the vessel has luminal irregularities (10).  - Left Anterior Descending Artery:  The proximal LAD has a 80% stenosis. There is ABBY 3 flow. The remaining portion of the vessel has luminal irregularities (10).  The mid LAD has chronic total occlusion. There is ABBY 0 flow.  The distal LAD has luminal irregularities. There is ABBY 3 flow.  - D1:  The D1 has a 75% stenosis. There is ABBY 3 flow. The remaining portion of the vessel is of small caliber.  - D2:  The D2 has chronic total occlusion. There is ABBY 0 flow. The remaining portion of the vessel is of small caliber.  - Left Circumflex Artery:  The proximal LCX has a 60% stenosis. There is ABBY 3 flow. FFR was 0.94.  The distal LCX has luminal irregularities. There is ABBY 3 flow.  - OM1:  The OM1 has luminal irregularities. There is ABBY 3  flow. The remaining portion of the vessel is of small caliber.  - OM2:  The OM2 has luminal irregularities. There is ABBY 3 flow. The remaining portion of the vessel is of small caliber.  - OM3:  The OM3 has a 10% stenosis. There is ABBY 3 flow. The remaining portion of the vessel has luminal irregularities (10).  - OM4:  The OM4 has a 60% stenosis. There is ABBY 3 flow. FFR was 0.89. The remaining portion of the vessel has luminal irregularities (10).  - Right Coronary Artery:  The RCA has luminal irregularities. There is ABBY 3 flow.  - Posterior Lateral Branch:  The PLB has a 40% stenosis. There is ABBY 3 flow. The remaining portion of the vessel has luminal irregularities (10).  - Posterior Descending Artery:  The PDA has a 30% stenosis. There is ABBY 3 flow. The remaining portion of the vessel has luminal irregularities (10).  - LIMA To LAD:  The LIMA to LAD is normal. There is ABBY 3 flow.    F. Details of Procedure    PROCEDURES PERFORMED: Fractional Flow Reserve (FFR), Graft Angiography, LHC and Coronary Angio    ANESTHESIA: Conscious sedation was achieved with 100 mcg of FENTANYL and 2 mg of MIDAZOLAM (VERSED) 1MG/ML. Local anesthesia was achieved with 20 ml of LIDOCAINE 2% 20ML. Moderate conscious sedation was performed and cardiorespiratory functions were  monitored the entire procedure by Nathalie Marcus RN. Sedation began at 10:22 AM and concluded at 11:01 AM, totalling 38.9 minutes.    PRIMARY SURGEON: Juancho Black MD  CO-SURGEON: Silas Diego MD    COMPLICATIONS: There were no complications.    Medications given on sterile field: Lidocaine 2% 20ml (20 ml) and Nitroglycerine 200mcg/ml (10 ml).    Medications given during procedure: Aspirin (325 mg), Heparin Bag 1000 Units/500ml (2 bags), Fentanyl (100 mcg), Midazolam (versed) 1mg/ml (2 mg), Ancef (2 g) and Adenosine  140mcg/kg/min (280 mcg/kg/min).    The patient was brought to the catheterization laboratory after premedication with oral Benadryl 50  mg. Bilateral groin prepped and draped. The Custom Kit was flushed and connected to contrast. After local anesthesia, a [id number] Sheath was inserted into  the right femoral artery.    AORTA    A .035X150 J Wire was inserted into the Aorta. A 6FR JL4.0 Catheter was inserted into the Aorta. The .035X150 J Wire was removed. Angiography performed in multiple views in the left coronary arteries. The 6FR JL4.0 Catheter was removed.    Left  VENTRICLE    A 6FR JR4.0 Catheter was inserted into the left ventricle. Hemodynamics recorded in the left ventricle.    RCA    The 6FR JR4.0 Catheter was repositioned into the ostial RCA. Angiography performed in multiple views in the right coronary arteries.    LIMA TO LAD    The 6FR JR4.0 Catheter was repositioned into the LIMA to LAD. A Guidewire J .035 X 260cm was inserted into the LIMA to LAD. The 6FR JR4.0 Catheter was removed. A 4FR TORIN Catheter was inserted into the LIMA to LAD. The Guidewire J .035 X 260cm was  removed. Angiography performed in multiple views in the LIMA to LAD. The 4FR TORIN Catheter was removed.    LM    A 6FR Ebu 3.5 Guide Cath Launcher was inserted into the ostial LM.    OM3    A Verrata/prestige Ffr Wire W/jcurv was inserted into the OM3. Fractional flow reserve performed. ffr=.89.    LCX    The Verrata/prestige Ffr Wire W/jcurv was repositioned into the proximal LCX. Fractional flow reserve performed. ffr=.94. The Verrata/prestige Ffr Wire W/jcurv was removed. Angiography performed in the left coronary arteries.    The 6FR Ebu 3.5 Guide Cath Launcher was removed. A 6F/7R Mynx Closure Device was inserted into the right femoral artery. The [id number] Sheath was removed. A 6F/7R Mynx Closure Device was deployed into the right femoral artery. Total Omnipaque injected was  60.0 ml. Total Omnipaque used was 125.0 ml.      Fluoroscopy Time 7 minutes  Radiation Dose 916.7 mGy  Contrast Injected 60 ml Omnipaque  Contrast Used  125 ml Omnipaque        Procedure log  documented by Cheri Boyd RT and verified by Juancho Black MD    ESTIMATED BLOOD LOSS is < 50 cc.    SPECIMEN: No specimen.    G. Recommendations    1. Routine post-cath care.  2. Maximize medical secondary prevention of CAD.  3. Risk factor reduction.  4. EC ASA 81mg po qay  5. Increae Imdur from 60 to 90mg po qday  6. Follow-up with Dr. García in 2-4 weeks    I certify that I was present for catheter insertion, catheter manipulation, angiography, and angiographic interpretation of this patient.      PAST MEDICAL HISTORY:     Past Medical History:   Diagnosis Date    CHF (congestive heart failure) 8/29/2017    Coronary artery disease 2006, 5/2013    s/p pci to rca (2006), LCx (5/2013)    DM (diabetes mellitus)     HLD (hyperlipidemia)     HTN (hypertension)     Kidney stone     EMELI (obstructive sleep apnea) 11/2/2015    Subclavian artery stenosis, left     Syncope and collapse 11/8/2023       PAST SURGICAL HISTORY:     Past Surgical History:   Procedure Laterality Date    BACK SURGERY      CHOLECYSTECTOMY  2011    CORONARY ANGIOPLASTY      CORONARY ARTERY BYPASS GRAFT  2000    5 bypass surgeries    SPINE SURGERY  1980    TONSILLECTOMY             SOCIAL HISTORY:   Social History[1]    FAMILY HISTORY:     Family History   Problem Relation Name Age of Onset    Heart disease Mother      Heart attack Mother      Heart disease Father      Heart attack Father      Hypertension Brother      Heart disease Paternal Uncle      Prostate cancer Neg Hx      Kidney disease Neg Hx         REVIEW OF SYSTEMS:   Review of Systems   Constitutional: Negative.   HENT: Negative.     Eyes: Negative.    Cardiovascular: Negative.    Respiratory: Negative.     Endocrine: Negative.    Hematologic/Lymphatic: Negative.    Skin: Negative.    Musculoskeletal: Negative.    Gastrointestinal: Negative.    Genitourinary: Negative.    Neurological: Negative.    Psychiatric/Behavioral: Negative.     Allergic/Immunologic: Negative.        A 10 point  review of systems was performed and all the pertinent positives have been mentioned. Rest of review of systems was negative.        PHYSICAL EXAM:     Vitals:    05/22/25 1035   BP: 110/62   Pulse: 82   Resp: 18    Body mass index is 28.34 kg/m².  Weight: 94.8 kg (208 lb 15.9 oz)   Height: 6' (182.9 cm)     Physical Exam  Vitals reviewed.   Constitutional:       Appearance: He is well-developed.   HENT:      Head: Normocephalic.   Eyes:      Conjunctiva/sclera: Conjunctivae normal.      Pupils: Pupils are equal, round, and reactive to light.   Cardiovascular:      Rate and Rhythm: Normal rate and regular rhythm.      Heart sounds: Normal heart sounds.   Pulmonary:      Effort: Pulmonary effort is normal.      Breath sounds: Normal breath sounds.   Abdominal:      General: Bowel sounds are normal.      Palpations: Abdomen is soft.   Musculoskeletal:      Cervical back: Normal range of motion and neck supple.   Skin:     General: Skin is warm.   Neurological:      Mental Status: He is alert and oriented to person, place, and time.         Physical Exam              DATA:     Laboratory:  CBC:  Recent Labs   Lab 11/08/23  0138   WBC 7.23   Hemoglobin 12.8 L   Hematocrit 41.8   Platelets 159       CHEMISTRIES:  Recent Labs   Lab 11/07/23  2223 11/08/23  0138 11/08/23  0138 11/08/23  1702 10/22/24  1955 01/31/25  0856 02/24/25  1126 04/09/25  1133 05/21/25  1222   Glucose  --  129 H  --  247 H  --   --   --  243 H  --    Sodium  --  140   < > 140   < > 137 132 L 137 139   Potassium  --  4.5   < > 4.0   < > 3.9 5.2 3.6  --    BUN  --  29 H   < > 27 H   < > 40.7 H 41.2 H  --  27.1 H   Blood Urea Nitrogen  --   --   --   --   --   --   --  45 H  --    Creatinine  --  2.5 H   < > 2.1 H   < > 1.58 H 1.83 H 2.02 H 1.86 H   Calcium  --  8.6 L   < > 8.9   < > 10.1 9.3 9.9 9.0   Magnesium 2.2 2.1  --   --   --   --   --   --   --     < > = values in this interval not displayed.       CARDIAC BIOMARKERS:        COAGS:  Recent Labs    Lab 11/07/23 2223 11/15/24  1048 02/24/25  1126   INR 1.7 H 1.1 1.1       LIPIDS/LFTS:  Recent Labs   Lab 10/22/24  1955 01/29/25  2032 02/24/25  1126 05/21/25  1222   Cholesterol  --   --   --  106   Triglycerides  --   --   --  142   HDL  --   --   --  50   LDL Calculated  --   --   --  28   Non-HDL Cholesterol  --   --   --  56   AST 12 27 20  --    ALT 16 38 35 18       Hemoglobin A1C   Date Value Ref Range Status   05/21/2025 8.5 (H) 4.7 - 5.6 % Final   11/08/2023 7.9 (H) 4.0 - 5.6 % Final     Comment:     ADA Screening Guidelines:  5.7-6.4%  Consistent with prediabetes  >or=6.5%  Consistent with diabetes    High levels of fetal hemoglobin interfere with the HbA1C  assay. Heterozygous hemoglobin variants (HbS, HgC, etc)do  not significantly interfere with this assay.   However, presence of multiple variants may affect accuracy.     07/01/2020 8.0 (H) 4.0 - 5.6 % Final     Comment:     ADA Screening Guidelines:  5.7-6.4%  Consistent with prediabetes  >or=6.5%  Consistent with diabetes  High levels of fetal hemoglobin interfere with the HbA1C  assay. Heterozygous hemoglobin variants (HbS, HgC, etc)do  not significantly interfere with this assay.   However, presence of multiple variants may affect accuracy.     07/31/2017 8.6 (H) 4.0 - 5.6 % Final     Comment:     According to ADA guidelines, hemoglobin A1c <7.0% represents  optimal control in non-pregnant diabetic patients. Different  metrics may apply to specific patient populations.   Standards of Medical Care in Diabetes-2016.  For the purpose of screening for the presence of diabetes:  <5.7%     Consistent with the absence of diabetes  5.7-6.4%  Consistent with increasing risk for diabetes   (prediabetes)  >or=6.5%  Consistent with diabetes  Currently, no consensus exists for use of hemoglobin A1c  for diagnosis of diabetes for children.  This Hemoglobin A1c assay has significant interference with fetal   hemoglobin   (HbF). The results are invalid for  "patients with abnormal amounts of   HbF,   including those with known Hereditary Persistence   of Fetal Hemoglobin. Heterozygous hemoglobin variants (HbAS, HbAC,   HbAD, HbAE, HbA2) do not significantly interfere with this assay;   however, presence of multiple variants in a sample may impact the %   interference.         TSH        The ASCVD Risk score (Thao GARCÍA, et al., 2019) failed to calculate for the following reasons:    Risk score cannot be calculated because patient has a medical history suggesting prior/existing ASCVD       BNP    Lab Results   Component Value Date/Time    BNP 22 07/30/2017 09:46 PM    BNP 74 08/08/2014 05:15 PM    BNP 48 06/28/2014 05:08 PM         ASSESSMENT AND PLAN     Problem List[2]        ALLERGIES AND MEDICATION:     Review of patient's allergies indicates:   Allergen Reactions    Sulfa (sulfonamide antibiotics) Photosensitivity    Zocor [simvastatin] Other (See Comments)     lathargic        Medication List            Accurate as of May 22, 2025  1:55 PM. If you have any questions, ask your nurse or doctor.                START taking these medications      metoprolol succinate 25 MG 24 hr tablet  Commonly known as: TOPROL-XL  Take 1 tablet (25 mg total) by mouth once daily.  Started by: Tejinder Camarillo MD            CONTINUE taking these medications      aspirin 81 MG EC tablet  Commonly known as: ECOTRIN     azelastine 137 mcg (0.1 %) nasal spray  Commonly known as: ASTELIN  Two sprays each nostril twice daily, sniff full until absorbed, then follow with fluticasone nasal spray     BD ULTRA-FINE JERMAINE PEN NEEDLE 32 gauge x 5/32" Ndle  Generic drug: pen needle, diabetic     clopidogreL 75 mg tablet  Commonly known as: PLAVIX  Take 1 tablet (75 mg total) by mouth every evening.     COZAAR 50 mg tablet  Generic drug: losartan     EASY TOUCH 31 gauge x 5/16" Ndle  Generic drug: pen needle, diabetic     famotidine 20 MG tablet  Commonly known as: PEPCID     LANTUS SOLOSTAR U-100 INSULIN " "SUBQ     metFORMIN 750 MG ER 24hr tablet  Commonly known as: GLUCOPHAGE-XR     nitroGLYCERIN 0.4 MG SL tablet  Commonly known as: NITROSTAT  Place 1 tab under the tongue for chest pain every 5 minutes x 3; if chest pain not relieved after 3 doses go to the ED     oxyCODONE-acetaminophen  mg per tablet  Commonly known as: PERCOCET  Take 1 tablet by mouth every 6 (six) hours as needed for Pain.     pregabalin 300 MG Cap  Commonly known as: LYRICA     rosuvastatin 20 MG tablet  Commonly known as: CRESTOR  Take 1 tablet (20 mg total) by mouth every evening.     SURE COMFORT INSULIN SYRINGE 0.5 mL 31 gauge x 5/16" Syrg  Generic drug: insulin syringe-needle U-100     VITAMIN B-12 ORAL               Where to Get Your Medications        These medications were sent to [facility] - [place] - [address]  [address] [id number]      Phone: [phone]   metoprolol succinate 25 MG 24 hr tablet         Orders Placed This Encounter   Procedures    IN OFFICE EKG 12-LEAD (to Muse)       Assessment & Plan    IMPRESSION:  Reviewed cardiac history, including 2005 CABG and subsequent stent placements. CAD s/p CABG x5 in 2000 (patent LIMA-LAD, and known occlusions of SVG-D1, SVG-OM, SVG-RCA), w/ subsequent PCIs to RCA (2006), LCx (2013), and OM4 (2017), hx left subclavian artery stent  January 2025 stress test showed mildly abnormal findings with small area of infarction in anterolateral region, likely related to previous surgery.  Patient is stable for upcoming surgery based on recent stress test results and lack of new symptoms.  BP and cholesterol levels are well-controlled.  A1C levels are improving, dropping from 11 to 8.5 over recent months.  Cleared for upcoming surgery, will provide clearance letter AT MODERATE RISK for coronary ischemia    PLAN SUMMARY:  - Continued Aspirin  - Continued Plavix  - Contact office if new symptoms develop  - Follow up in 6 months    CORONARY " ARTERY DISEASE:  CAD s/p CABG x5 in 2000 (patent LIMA-LAD, and known occlusions of SVG-D1, SVG-OM, SVG-RCA), w/ subsequent PCIs to RCA (2006), LCx (2013), and OM4 (2017), hx left subclavian artery stent, HTN, HLD, DM2   - Continued Aspirin.  - Continued Plavix.    LONG-TERM MEDICATION USE:  - Continued  Plavix.  May hold Plavix as needed for surgery.  Do not hold aspirin.    Dyslipidemia: Continue statins.  Goal LDL less than 55    FOLLOW-UP CARE:  - Follow up in 6 months.  - Contact the office for an earlier appointment if new symptoms develop.         Thank you very much for involving me in the care of your patient.  Please do not hesitate to contact me if there are any questions.      Tejinder Camarillo MD, [facility], [facility]  Interventional Cardiologist, [facility].       Visit today included increased complexity associated with the care of the episodic problem subclavian artery stenosis status post stent, coronary artery disease status post CABG, and PCI, dyslipidemia, hypertension addressed and managing the longitudinal care of the patient due to the serious and/or complex managed problem(s) Problem List[3]  .    This note was dictated with the help of speech recognition software.  There might be un-intended errors and/or substitutions.    This note was generated with the assistance of ambient listening technology. Verbal consent was obtained by the patient and accompanying visitor(s) for the recording of patient appointment to facilitate this note. I attest to having reviewed and edited the generated note for accuracy, though some syntax or spelling errors may persist. Please contact the author of this note for any clarification.                    [1]   Social History  Socioeconomic History    Marital status:    Tobacco Use    Smoking status: Never    Smokeless tobacco: Never   Substance and Sexual Activity    Alcohol use: No     Alcohol/week: 0.0 standard drinks of alcohol    Drug use: No    Sexual  activity: Yes     Partners: Female     Social Drivers of Health     Financial Resource Strain: High Risk (4/10/2025)    Received from [facility]    Overall Financial Resource Strain (CARDIA)     Difficulty of Paying Living Expenses: Hard   Food Insecurity: No Food Insecurity (4/10/2025)    Received from [facility]    Hunger Vital Sign     Worried About Running Out of Food in the Last Year: Never true     Ran Out of Food in the Last Year: Never true   Transportation Needs: No Transportation Needs (4/10/2025)    Received from [facility]    PRAPARE - Transportation     Lack of Transportation (Medical): No     Lack of Transportation (Non-Medical): No   Physical Activity: Inactive (4/10/2025)    Received from [facility]    Exercise Vital Sign     Days of Exercise per Week: 0 days     Minutes of Exercise per Session: 0 min   Stress: No Stress Concern Present (4/10/2025)    Received from [facility]    [employer] - Occupational Stress Questionnaire     Feeling of Stress : Only a little   Housing Stability: Low Risk  (4/10/2025)    Received from [facility]    Housing Stability Vital Sign     Unable to Pay for Housing in the Last Year: No     Number of Times Moved in the Last Year: 0     Homeless in the Last Year: No   [2]   Patient Active Problem List  Diagnosis    S/P CABG x 5 in 2000    Dyslipidemia    Type 2 diabetes mellitus with diabetic peripheral angiopathy without gangrene, with long-term current use of insulin    Atherosclerosis of autologous vein coronary artery bypass graft with stable angina pectoris    Orthostatic hypotension    Post PTCA: RCA (2006); LCx (2013); OM3 (2017)    EMELI (obstructive sleep apnea)    Shortness of breath    Episodic lightheadedness    Nephrolithiasis    Urolithiasis    Chest pain    CHF (congestive heart failure)    S/P subclavian artery angioplasty with stent placement in 8/2013    Coronary artery disease involving native coronary artery of native  heart without angina pectoris    Intolerance of continuous positive airway pressure (CPAP) ventilation    Hypertrophy of both inferior nasal turbinates    Nasal septal deviation    Allergic rhinitis    Rhinitis medicamentosa    Neck pain    Muscle weakness    Decreased ROM of neck    Elevated bilirubin    Essential hypertension    Sedentary lifestyle    Syncope and collapse    Hematuria    Kidney stones    Morbid (severe) obesity with alveolar hypoventilation   [3]   Patient Active Problem List  Diagnosis    S/P CABG x 5 in 2000    Dyslipidemia    Type 2 diabetes mellitus with diabetic peripheral angiopathy without gangrene, with long-term current use of insulin    Atherosclerosis of autologous vein coronary artery bypass graft with stable angina pectoris    Orthostatic hypotension    Post PTCA: RCA (2006); LCx (2013); OM3 (2017)    EMELI (obstructive sleep apnea)    Shortness of breath    Episodic lightheadedness    Nephrolithiasis    Urolithiasis    Chest pain    CHF (congestive heart failure)    S/P subclavian artery angioplasty with stent placement in 8/2013    Coronary artery disease involving native coronary artery of native heart without angina pectoris    Intolerance of continuous positive airway pressure (CPAP) ventilation    Hypertrophy of both inferior nasal turbinates    Nasal septal deviation    Allergic rhinitis    Rhinitis medicamentosa    Neck pain    Muscle weakness    Decreased ROM of neck    Elevated bilirubin    Essential hypertension    Sedentary lifestyle    Syncope and collapse    Hematuria    Kidney stones    Morbid (severe) obesity with alveolar hypoventilation

## 2025-07-07 ENCOUNTER — HOSPITAL ENCOUNTER (INPATIENT)
Facility: HOSPITAL | Age: 75
LOS: 1 days | Discharge: HOME | DRG: 683 | End: 2025-07-09
Attending: EMERGENCY MEDICINE | Admitting: EMERGENCY MEDICINE
Payer: MEDICARE

## 2025-07-07 DIAGNOSIS — N17.9 AKI (ACUTE KIDNEY INJURY): ICD-10-CM

## 2025-07-07 DIAGNOSIS — E87.6 HYPOKALEMIA: ICD-10-CM

## 2025-07-07 DIAGNOSIS — R79.89 ELEVATED TROPONIN: ICD-10-CM

## 2025-07-07 DIAGNOSIS — E86.0 DEHYDRATION: Primary | ICD-10-CM

## 2025-07-07 DIAGNOSIS — R07.9 CHEST PAIN: ICD-10-CM

## 2025-07-07 DIAGNOSIS — R53.83 FATIGUE: ICD-10-CM

## 2025-07-07 LAB
ABSOLUTE EOSINOPHIL (OHS): 0.11 K/UL
ABSOLUTE MONOCYTE (OHS): 0.98 K/UL (ref 0.3–1)
ABSOLUTE NEUTROPHIL COUNT (OHS): 9.96 K/UL (ref 1.8–7.7)
ALBUMIN SERPL BCP-MCNC: 3.5 G/DL (ref 3.5–5.2)
ALBUMIN SERPL BCP-MCNC: 4.2 G/DL (ref 3.5–5.2)
ALP SERPL-CCNC: 111 UNIT/L (ref 40–150)
ALP SERPL-CCNC: 93 UNIT/L (ref 40–150)
ALT SERPL W/O P-5'-P-CCNC: 37 UNIT/L (ref 10–44)
ALT SERPL W/O P-5'-P-CCNC: 45 UNIT/L (ref 10–44)
ANION GAP (OHS): 16 MMOL/L (ref 8–16)
ANION GAP (OHS): 16 MMOL/L (ref 8–16)
AST SERPL-CCNC: 29 UNIT/L (ref 11–45)
AST SERPL-CCNC: 35 UNIT/L (ref 11–45)
BACTERIA #/AREA URNS AUTO: ABNORMAL /HPF
BASOPHILS # BLD AUTO: 0.05 K/UL
BASOPHILS NFR BLD AUTO: 0.4 %
BILIRUB SERPL-MCNC: 1.2 MG/DL (ref 0.1–1)
BILIRUB SERPL-MCNC: 1.6 MG/DL (ref 0.1–1)
BILIRUB UR QL STRIP.AUTO: NEGATIVE
BNP SERPL-MCNC: 27 PG/ML (ref 0–99)
BUN SERPL-MCNC: 53 MG/DL (ref 8–23)
BUN SERPL-MCNC: 56 MG/DL (ref 8–23)
CALCIUM SERPL-MCNC: 11 MG/DL (ref 8.7–10.5)
CALCIUM SERPL-MCNC: 9.6 MG/DL (ref 8.7–10.5)
CHLORIDE SERPL-SCNC: 90 MMOL/L (ref 95–110)
CHLORIDE SERPL-SCNC: 93 MMOL/L (ref 95–110)
CHOLEST SERPL-MCNC: 85 MG/DL (ref 120–199)
CHOLEST/HDLC SERPL: 2.6 {RATIO} (ref 2–5)
CK SERPL-CCNC: 218 U/L (ref 20–200)
CLARITY UR: CLEAR
CO2 SERPL-SCNC: 29 MMOL/L (ref 23–29)
CO2 SERPL-SCNC: 30 MMOL/L (ref 23–29)
COLOR UR AUTO: YELLOW
CREAT SERPL-MCNC: 2.8 MG/DL (ref 0.5–1.4)
CREAT SERPL-MCNC: 3 MG/DL (ref 0.5–1.4)
ERYTHROCYTE [DISTWIDTH] IN BLOOD BY AUTOMATED COUNT: 13.2 % (ref 11.5–14.5)
GFR SERPLBLD CREATININE-BSD FMLA CKD-EPI: 21 ML/MIN/1.73/M2
GFR SERPLBLD CREATININE-BSD FMLA CKD-EPI: 23 ML/MIN/1.73/M2
GLUCOSE SERPL-MCNC: 209 MG/DL (ref 70–110)
GLUCOSE SERPL-MCNC: 232 MG/DL (ref 70–110)
GLUCOSE UR QL STRIP: ABNORMAL
GRAN CASTS UR QL COMP ASSIST: 31 /LPF (ref ?–0)
HCT VFR BLD AUTO: 52.2 % (ref 40–54)
HDLC SERPL-MCNC: 33 MG/DL (ref 40–75)
HDLC SERPL: 38.8 % (ref 20–50)
HGB BLD-MCNC: 17.7 GM/DL (ref 14–18)
HGB UR QL STRIP: ABNORMAL
HOLD SPECIMEN: NORMAL
HYALINE CASTS UR QL AUTO: 29 /LPF (ref 0–1)
IMM GRANULOCYTES # BLD AUTO: 0.04 K/UL (ref 0–0.04)
IMM GRANULOCYTES NFR BLD AUTO: 0.3 % (ref 0–0.5)
KETONES UR QL STRIP: ABNORMAL
LDLC SERPL CALC-MCNC: 21.4 MG/DL (ref 63–159)
LEUKOCYTE ESTERASE UR QL STRIP: NEGATIVE
LYMPHOCYTES # BLD AUTO: 1.18 K/UL (ref 1–4.8)
MAGNESIUM SERPL-MCNC: 2.3 MG/DL (ref 1.6–2.6)
MAGNESIUM SERPL-MCNC: 2.3 MG/DL (ref 1.6–2.6)
MCH RBC QN AUTO: 29.6 PG (ref 27–31)
MCHC RBC AUTO-ENTMCNC: 33.9 G/DL (ref 32–36)
MCV RBC AUTO: 87 FL (ref 82–98)
MICROSCOPIC COMMENT: ABNORMAL
NITRITE UR QL STRIP: NEGATIVE
NONHDLC SERPL-MCNC: 52 MG/DL
NUCLEATED RBC (/100WBC) (OHS): 0 /100 WBC
PH UR STRIP: 6 [PH]
PLATELET # BLD AUTO: 158 K/UL (ref 150–450)
PMV BLD AUTO: 11.2 FL (ref 9.2–12.9)
POCT GLUCOSE: 237 MG/DL (ref 70–110)
POTASSIUM SERPL-SCNC: 2.8 MMOL/L (ref 3.5–5.1)
POTASSIUM SERPL-SCNC: 3.1 MMOL/L (ref 3.5–5.1)
PROT SERPL-MCNC: 7.2 GM/DL (ref 6–8.4)
PROT SERPL-MCNC: 8.5 GM/DL (ref 6–8.4)
PROT UR QL STRIP: ABNORMAL
RBC # BLD AUTO: 5.98 M/UL (ref 4.6–6.2)
RBC #/AREA URNS AUTO: 0 /HPF (ref 0–4)
RELATIVE EOSINOPHIL (OHS): 0.9 %
RELATIVE LYMPHOCYTE (OHS): 9.6 % (ref 18–48)
RELATIVE MONOCYTE (OHS): 8 % (ref 4–15)
RELATIVE NEUTROPHIL (OHS): 80.8 % (ref 38–73)
SODIUM SERPL-SCNC: 135 MMOL/L (ref 136–145)
SODIUM SERPL-SCNC: 139 MMOL/L (ref 136–145)
SP GR UR STRIP: 1.02
TRIGL SERPL-MCNC: 153 MG/DL (ref 30–150)
TROPONIN I SERPL DL<=0.01 NG/ML-MCNC: 0.03 NG/ML
TROPONIN I SERPL DL<=0.01 NG/ML-MCNC: 0.04 NG/ML
UROBILINOGEN UR STRIP-ACNC: NEGATIVE EU/DL
WBC # BLD AUTO: 12.32 K/UL (ref 3.9–12.7)
WBC #/AREA URNS AUTO: 1 /HPF (ref 0–5)

## 2025-07-07 PROCEDURE — 83735 ASSAY OF MAGNESIUM: CPT | Mod: 91 | Performed by: NURSE PRACTITIONER

## 2025-07-07 PROCEDURE — 25000003 PHARM REV CODE 250: Performed by: EMERGENCY MEDICINE

## 2025-07-07 PROCEDURE — 80061 LIPID PANEL: CPT | Performed by: NURSE PRACTITIONER

## 2025-07-07 PROCEDURE — 82962 GLUCOSE BLOOD TEST: CPT

## 2025-07-07 PROCEDURE — 84484 ASSAY OF TROPONIN QUANT: CPT | Mod: 91 | Performed by: NURSE PRACTITIONER

## 2025-07-07 PROCEDURE — 84450 TRANSFERASE (AST) (SGOT): CPT

## 2025-07-07 PROCEDURE — 96372 THER/PROPH/DIAG INJ SC/IM: CPT | Performed by: NURSE PRACTITIONER

## 2025-07-07 PROCEDURE — 94799 UNLISTED PULMONARY SVC/PX: CPT

## 2025-07-07 PROCEDURE — 82550 ASSAY OF CK (CPK): CPT | Performed by: EMERGENCY MEDICINE

## 2025-07-07 PROCEDURE — 36415 COLL VENOUS BLD VENIPUNCTURE: CPT | Performed by: NURSE PRACTITIONER

## 2025-07-07 PROCEDURE — 99285 EMERGENCY DEPT VISIT HI MDM: CPT | Mod: 25

## 2025-07-07 PROCEDURE — 63600175 PHARM REV CODE 636 W HCPCS: Performed by: NURSE PRACTITIONER

## 2025-07-07 PROCEDURE — 83735 ASSAY OF MAGNESIUM: CPT | Mod: 59 | Performed by: EMERGENCY MEDICINE

## 2025-07-07 PROCEDURE — G0378 HOSPITAL OBSERVATION PER HR: HCPCS

## 2025-07-07 PROCEDURE — 85025 COMPLETE CBC W/AUTO DIFF WBC: CPT

## 2025-07-07 PROCEDURE — 83880 ASSAY OF NATRIURETIC PEPTIDE: CPT

## 2025-07-07 PROCEDURE — 93010 ELECTROCARDIOGRAM REPORT: CPT | Mod: ,,, | Performed by: INTERNAL MEDICINE

## 2025-07-07 PROCEDURE — 81001 URINALYSIS AUTO W/SCOPE: CPT | Performed by: EMERGENCY MEDICINE

## 2025-07-07 PROCEDURE — 99900035 HC TECH TIME PER 15 MIN (STAT)

## 2025-07-07 PROCEDURE — 96361 HYDRATE IV INFUSION ADD-ON: CPT

## 2025-07-07 PROCEDURE — 93005 ELECTROCARDIOGRAM TRACING: CPT

## 2025-07-07 PROCEDURE — 99214 OFFICE O/P EST MOD 30 MIN: CPT | Mod: ,,, | Performed by: INTERNAL MEDICINE

## 2025-07-07 PROCEDURE — 80053 COMPREHEN METABOLIC PANEL: CPT | Mod: 91 | Performed by: NURSE PRACTITIONER

## 2025-07-07 PROCEDURE — 84484 ASSAY OF TROPONIN QUANT: CPT

## 2025-07-07 PROCEDURE — 25000003 PHARM REV CODE 250: Performed by: NURSE PRACTITIONER

## 2025-07-07 RX ORDER — PREGABALIN 50 MG/1
300 CAPSULE ORAL 2 TIMES DAILY
Status: DISCONTINUED | OUTPATIENT
Start: 2025-07-07 | End: 2025-07-09 | Stop reason: HOSPADM

## 2025-07-07 RX ORDER — SODIUM CHLORIDE 0.9 % (FLUSH) 0.9 %
10 SYRINGE (ML) INJECTION EVERY 8 HOURS PRN
Status: DISCONTINUED | OUTPATIENT
Start: 2025-07-07 | End: 2025-07-09 | Stop reason: HOSPADM

## 2025-07-07 RX ORDER — ASPIRIN 325 MG
325 TABLET ORAL
Status: COMPLETED | OUTPATIENT
Start: 2025-07-07 | End: 2025-07-07

## 2025-07-07 RX ORDER — TIRZEPATIDE 7.5 MG/.5ML
7.5 INJECTION, SOLUTION SUBCUTANEOUS
COMMUNITY

## 2025-07-07 RX ORDER — TALC
6 POWDER (GRAM) TOPICAL NIGHTLY PRN
Status: DISCONTINUED | OUTPATIENT
Start: 2025-07-07 | End: 2025-07-09 | Stop reason: HOSPADM

## 2025-07-07 RX ORDER — SODIUM CHLORIDE 9 MG/ML
INJECTION, SOLUTION INTRAVENOUS CONTINUOUS
Status: DISCONTINUED | OUTPATIENT
Start: 2025-07-07 | End: 2025-07-09 | Stop reason: HOSPADM

## 2025-07-07 RX ORDER — ROSUVASTATIN CALCIUM 40 MG/1
40 TABLET, COATED ORAL DAILY
COMMUNITY

## 2025-07-07 RX ORDER — ONDANSETRON HYDROCHLORIDE 2 MG/ML
4 INJECTION, SOLUTION INTRAVENOUS EVERY 8 HOURS PRN
Status: DISCONTINUED | OUTPATIENT
Start: 2025-07-07 | End: 2025-07-09 | Stop reason: HOSPADM

## 2025-07-07 RX ORDER — ACETAMINOPHEN 325 MG/1
650 TABLET ORAL EVERY 4 HOURS PRN
Status: DISCONTINUED | OUTPATIENT
Start: 2025-07-07 | End: 2025-07-09 | Stop reason: HOSPADM

## 2025-07-07 RX ORDER — FAMOTIDINE 20 MG/1
20 TABLET, FILM COATED ORAL
Status: DISCONTINUED | OUTPATIENT
Start: 2025-07-08 | End: 2025-07-09

## 2025-07-07 RX ORDER — AMOXICILLIN 250 MG
1 CAPSULE ORAL 2 TIMES DAILY PRN
Status: DISCONTINUED | OUTPATIENT
Start: 2025-07-07 | End: 2025-07-09 | Stop reason: HOSPADM

## 2025-07-07 RX ORDER — CLOPIDOGREL BISULFATE 75 MG/1
75 TABLET ORAL NIGHTLY
Status: DISCONTINUED | OUTPATIENT
Start: 2025-07-08 | End: 2025-07-09 | Stop reason: HOSPADM

## 2025-07-07 RX ORDER — DULOXETIN HYDROCHLORIDE 30 MG/1
30 CAPSULE, DELAYED RELEASE ORAL DAILY
COMMUNITY
Start: 2025-06-18

## 2025-07-07 RX ORDER — NALOXONE HCL 0.4 MG/ML
0.02 VIAL (ML) INJECTION
Status: DISCONTINUED | OUTPATIENT
Start: 2025-07-07 | End: 2025-07-09 | Stop reason: HOSPADM

## 2025-07-07 RX ORDER — ENOXAPARIN SODIUM 100 MG/ML
30 INJECTION SUBCUTANEOUS EVERY 24 HOURS
Status: DISCONTINUED | OUTPATIENT
Start: 2025-07-07 | End: 2025-07-07

## 2025-07-07 RX ORDER — ASPIRIN 81 MG/1
81 TABLET ORAL DAILY
Status: DISCONTINUED | OUTPATIENT
Start: 2025-07-08 | End: 2025-07-09 | Stop reason: HOSPADM

## 2025-07-07 RX ORDER — IPRATROPIUM BROMIDE AND ALBUTEROL SULFATE 2.5; .5 MG/3ML; MG/3ML
3 SOLUTION RESPIRATORY (INHALATION) EVERY 4 HOURS PRN
Status: DISCONTINUED | OUTPATIENT
Start: 2025-07-07 | End: 2025-07-09 | Stop reason: HOSPADM

## 2025-07-07 RX ORDER — HYDROCHLOROTHIAZIDE 25 MG/1
25 TABLET ORAL 2 TIMES DAILY
COMMUNITY
End: 2025-07-08 | Stop reason: HOSPADM

## 2025-07-07 RX ORDER — SIMETHICONE 80 MG
1 TABLET,CHEWABLE ORAL 4 TIMES DAILY PRN
Status: DISCONTINUED | OUTPATIENT
Start: 2025-07-07 | End: 2025-07-09 | Stop reason: HOSPADM

## 2025-07-07 RX ADMIN — POTASSIUM BICARBONATE 20 MEQ: 391 TABLET, EFFERVESCENT ORAL at 02:07

## 2025-07-07 RX ADMIN — SODIUM CHLORIDE 1000 ML: 9 INJECTION, SOLUTION INTRAVENOUS at 02:07

## 2025-07-07 RX ADMIN — SODIUM CHLORIDE: 9 INJECTION, SOLUTION INTRAVENOUS at 05:07

## 2025-07-07 RX ADMIN — ASPIRIN 325 MG: 325 TABLET ORAL at 02:07

## 2025-07-07 RX ADMIN — ENOXAPARIN SODIUM 30 MG: 30 INJECTION SUBCUTANEOUS at 05:07

## 2025-07-07 NOTE — PHARMACY MED REC
"    07/07/2025 Patient was able to confirm medication at bedside.  \Admission Medication History     The home medication history was taken by Tonie Bland.    You may go to "Admission" then "Reconcile Home Medications" tabs to review and/or act upon these items.     The home medication list has been updated by the Pharmacy department.   Please read ALL comments highlighted in yellow.   Please address this information as you see fit.    Feel free to contact us if you have any questions or require assistance.      The medications listed below were removed from the home medication list. Please reorder if appropriate:  Patient reports no longer taking the following medication(s):  Azelastine (Astelin) 137 Mcg 0.1 % spray  Cozaar 50 Mg tablets  Metformin  Mg        Medications listed below were obtained from: Patient/family and Analytic software- [employer]  (Not in a hospital admission)            Tonie Bland [facility] Medication Access Specialist [phone]               .          "

## 2025-07-07 NOTE — Clinical Note
Diagnosis: Elevated troponin [462682]   Future Attending Provider: YULIET GOLD [[id number]]   Special Needs:: No Special Needs [1]

## 2025-07-07 NOTE — ED NOTES
Sherif Knutson , a 74 y.o. male presents to the ED w/ complaint of dizziness and weakness upon standing x 1.5 weeks.  Pt denies changes in medication. Patient is AAOx3, VSS, NAD. Denies CP, SOB, N/V/D/C, cough, fever, numbness, or tingling. Bed locked, in lowest position, bed rails up x2, call light in reach, all monitoring attached.    Triage note:  Chief Complaint   Patient presents with    Weakness     75 yo male to triage for weakness, hypotension, and feeling lightheaded for about a week. Denies SOB and CP, N/V/D. VSS, NAD    Hypotension     Review of patient's allergies indicates:   Allergen Reactions    Sulfa (sulfonamide antibiotics) Photosensitivity    Zocor [simvastatin] Other (See Comments)     lathargic     Past Medical History:   Diagnosis Date    CHF (congestive heart failure) 8/29/2017    Coronary artery disease 2006, 5/2013    s/p pci to rca (2006), LCx (5/2013)    DM (diabetes mellitus)     HLD (hyperlipidemia)     HTN (hypertension)     Kidney stone     EMELI (obstructive sleep apnea) 11/2/2015    Subclavian artery stenosis, left     Syncope and collapse 11/8/2023

## 2025-07-07 NOTE — ED PROVIDER NOTES
Encounter Date: 7/7/2025    SCRIBE #1 NOTE: I, Janice Sanchez, am scribing for, and in the presence of,  Sri Hester MD. I have scribed the following portions of the note - Other sections scribed: HPI,ROS,PE.       History     Chief Complaint   Patient presents with    Weakness     73 yo male to triage for weakness, hypotension, and feeling lightheaded for about a week. Denies SOB and CP, N/V/D. VSS, NAD    Hypotension     Sherif Knutson Jr. is a 74 y.o. male, with a PMHx of Syncope, CHF on Plavix and baby aspirin, CAD s/p CABG(2000), HTN on losartan, HLD, kidney stone, who presents to the ED with complaint of symptomatic hypotension that began 1 week ago. Patient reports feeling fatigue with associated lightheadedness. Reports he decided to check his pressure due to having past episodes with similar symptoms where he was dehydrated. Reports this AM his pressure was 114/70 while sitting and 75/51 while standing prompting ED visit. Patient denies any recent strenuous outdoor activities. Reports he remains  well hydrated. Reports a recent change in medication where he began to take Mounjaro 2 months ago. Reports he noticed his weight changed where he loss 18lbs, but states he related it to the medications side effects.  He reports compliance with his prescribed medications, but denies taking them prior to ED visit. Denies taking his losartan for the past 4 days due to hypotensive concerns. No other exacerbating or alleviating factors. Denies fever, chills, cough, nausea, emesis, diarrhea, or other associated symptoms.       The history is provided by the patient. No  was used.     Review of patient's allergies indicates:   Allergen Reactions    Sulfa (sulfonamide antibiotics) Photosensitivity    Zocor [simvastatin] Other (See Comments)     lathargic     Past Medical History:   Diagnosis Date    CHF (congestive heart failure) 8/29/2017    Coronary artery disease 2006, 5/2013    s/p pci to rca  (2006), LCx (5/2013)    DM (diabetes mellitus)     HLD (hyperlipidemia)     HTN (hypertension)     Kidney stone     EMELI (obstructive sleep apnea) 11/2/2015    Subclavian artery stenosis, left     Syncope and collapse 11/8/2023     Past Surgical History:   Procedure Laterality Date    BACK SURGERY      CHOLECYSTECTOMY  2011    CORONARY ANGIOPLASTY      CORONARY ARTERY BYPASS GRAFT  2000    5 bypass surgeries    SPINE SURGERY  1980    TONSILLECTOMY       Family History   Problem Relation Name Age of Onset    Heart disease Mother      Heart attack Mother      Heart disease Father      Heart attack Father      Hypertension Brother      Heart disease Paternal Uncle      Prostate cancer Neg Hx      Kidney disease Neg Hx       Social History[1]  Review of Systems   Constitutional:  Positive for fatigue. Negative for chills and fever.   HENT:  Negative for congestion and sore throat.    Eyes:  Negative for visual disturbance.   Respiratory:  Negative for cough and shortness of breath.    Cardiovascular:  Negative for chest pain.   Gastrointestinal:  Negative for abdominal pain, nausea and vomiting.   Genitourinary:  Negative for dysuria.   Skin:  Negative for rash.   Neurological:  Positive for light-headedness. Negative for headaches.   Psychiatric/Behavioral:  Negative for confusion.        Physical Exam     Initial Vitals [07/07/25 1157]   BP Pulse Resp Temp SpO2   (!) 97/55 (!) 116 17 97.7 °F (36.5 °C) (!) 93 %      MAP       --         Physical Exam    Nursing note and vitals reviewed.  Constitutional: He appears well-developed and well-nourished. He is not diaphoretic. No distress.   HENT:   Head: Normocephalic and atraumatic. Mouth/Throat: Oropharynx is clear and moist.   Eyes: EOM are normal. Pupils are equal, round, and reactive to light.   Neck: Neck supple.   Cardiovascular:  Normal rate and regular rhythm.           Pulmonary/Chest: Breath sounds normal. No respiratory distress.   Abdominal: Abdomen is soft.  Bowel sounds are normal.   Musculoskeletal:         General: No edema.      Cervical back: Neck supple.     Neurological: He is alert and oriented to person, place, and time.   Skin: Skin is warm and dry.   Psychiatric: He has a normal mood and affect.         ED Course   Procedures  Labs Reviewed   COMPREHENSIVE METABOLIC PANEL - Abnormal       Result Value    Sodium 135 (*)     Potassium 2.8 (*)     Chloride 90 (*)     CO2 29      Glucose 232 (*)     BUN 56 (*)     Creatinine 3.0 (*)     Calcium 11.0 (*)     Protein Total 8.5 (*)     Albumin 4.2      Bilirubin Total 1.6 (*)           AST 35      ALT 45 (*)     Anion Gap 16      eGFR 21 (*)    TROPONIN I - Abnormal    Troponin-I 0.035 (*)    CBC WITH DIFFERENTIAL - Abnormal    WBC 12.32      RBC 5.98      HGB 17.7      HCT 52.2      MCV 87      MCH 29.6      MCHC 33.9      RDW 13.2      Platelet Count 158      MPV 11.2      Nucleated RBC 0      Neut % 80.8 (*)     Lymph % 9.6 (*)     Mono % 8.0      Eos % 0.9      Basophil % 0.4      Imm Grans % 0.3      Neut # 9.96 (*)     Lymph # 1.18      Mono # 0.98      Eos # 0.11      Baso # 0.05      Imm Grans # 0.04     CK - Abnormal     (*)    URINALYSIS, REFLEX TO URINE CULTURE - Abnormal    Color, UA Yellow      Appearance, UA Clear      pH, UA 6.0      Spec Grav UA 1.020      Protein, UA 2+ (*)     Glucose, UA Trace (*)     Ketones, UA 1+ (*)     Bilirubin, UA Negative      Blood, UA 2+ (*)     Nitrites, UA Negative      Urobilinogen, UA Negative      Leukocyte Esterase, UA Negative     URINALYSIS MICROSCOPIC - Abnormal    RBC, UA 0      WBC, UA 1      Bacteria, UA Rare      Hyaline Casts, UA 29 (*)     Granular Casts 31 (*)     Microscopic Comment       TROPONIN I - Abnormal    Troponin-I 0.031 (*)    COMPREHENSIVE METABOLIC PANEL - Abnormal    Sodium 139      Potassium 3.1 (*)     Chloride 93 (*)     CO2 30 (*)     Glucose 209 (*)     BUN 53 (*)     Creatinine 2.8 (*)     Calcium 9.6      Protein Total  7.2      Albumin 3.5      Bilirubin Total 1.2 (*)     ALP 93      AST 29      ALT 37      Anion Gap 16      eGFR 23 (*)    LIPID PANEL - Abnormal    Cholesterol Total 85 (*)     Triglyceride 153 (*)     HDL Cholesterol 33 (*)     LDL Cholesterol 21.4 (*)     HDL/Cholesterol Ratio 38.8      Cholesterol/HDL Ratio 2.6      Non HDL Cholesterol 52     COMPREHENSIVE METABOLIC PANEL - Abnormal    Sodium 141      Potassium 2.5 (*)     Chloride 99      CO2 28      Glucose 117 (*)     BUN 45 (*)     Creatinine 2.2 (*)     Calcium 9.1      Protein Total 6.9      Albumin 3.4 (*)     Bilirubin Total 0.9      ALP 86      AST 24      ALT 33      Anion Gap 14      eGFR 31 (*)    CBC WITH DIFFERENTIAL - Abnormal    WBC 8.90      RBC 5.37      HGB 15.6      HCT 48.3      MCV 90      MCH 29.1      MCHC 32.3      RDW 13.0      Platelet Count 140 (*)     MPV 11.3      Nucleated RBC 0      Neut % 68.2      Lymph % 18.7      Mono % 10.6      Eos % 1.5      Basophil % 0.8      Imm Grans % 0.2      Neut # 6.08      Lymph # 1.66      Mono # 0.94      Eos # 0.13      Baso # 0.07      Imm Grans # 0.02     RENAL FUNCTION PANEL - Abnormal    Sodium 137      Potassium 2.7 (*)     Chloride 97      CO2 32 (*)     Glucose 198 (*)     BUN 40 (*)     Creatinine 2.1 (*)     Calcium 8.8      Albumin 3.2 (*)     Phosphorus Level 1.5 (*)     Anion Gap 8      eGFR 33 (*)    RENAL FUNCTION PANEL - Abnormal    Sodium 143      Potassium 3.3 (*)     Chloride 98      CO2 31 (*)     Glucose 192 (*)     BUN 35 (*)     Creatinine 2.5 (*)     Calcium 9.2      Albumin 3.5      Phosphorus Level 2.9      Anion Gap 14      eGFR 26 (*)    POCT GLUCOSE - Abnormal    POCT Glucose 237 (*)    POCT GLUCOSE - Abnormal    POCT Glucose 235 (*)    B-TYPE NATRIURETIC PEPTIDE - Normal    BNP 27     MAGNESIUM - Normal    Magnesium  2.3     MAGNESIUM - Normal    Magnesium  2.3     TROPONIN I - Normal    Troponin-I 0.026     TROPONIN I - Normal    Troponin-I 0.023     MAGNESIUM -  Normal    Magnesium  2.2     PHOSPHORUS - Normal    Phosphorus Level 3.0     CBC W/ AUTO DIFFERENTIAL    Narrative:     The following orders were created for panel order CBC auto differential.  Procedure                               Abnormality         Status                     ---------                               -----------         ------                     CBC with Differential[[id number]]       Abnormal            Final result                 Please view results for these tests on the individual orders.   GREY TOP URINE HOLD    Extra Tube Hold for add-ons.     EXTRA TUBES    Narrative:     The following orders were created for panel order EXTRA TUBES.  Procedure                               Abnormality         Status                     ---------                               -----------         ------                     Lavender Top Hold[[id number]]                               Final result                 Please view results for these tests on the individual orders.   LAVENDER TOP HOLD    Extra Tube Hold for add-ons.     CBC W/ AUTO DIFFERENTIAL    Narrative:     The following orders were created for panel order CBC Auto Differential.  Procedure                               Abnormality         Status                     ---------                               -----------         ------                     CBC with Differential[[id number]]       Abnormal            Final result                 Please view results for these tests on the individual orders.        ECG Results              EKG 12-lead (Final result)        Collection Time Result Time QRS Duration OHS QTC Calculation    07/07/25 12:02:36 07/09/25 18:36:40 130 482                     Final result by Interface, Lab In [facility] (07/09/25 18:36:42)                   Narrative:    Test Reason : R53.83,    Vent. Rate : 113 BPM     Atrial Rate : 113 BPM     P-R Int : 166 ms          QRS Dur : 130 ms      QT Int : 352 ms       P-R-T Axes :  69 170   24 degrees    QTcB Int : 482 ms    Sinus tachycardia  Right axis deviation  Nonspecific intraventricular block  Abnormal ECG  When compared with ECG of 22-May-2025 03:40,  Significant changes have occurred  Confirmed by Fatou Anton ([id number]) on 7/9/2025 6:36:34 PM    Referred By: AAAREFERRAL SELF           Confirmed By: Fatou Anton                      Wet Read by Sri Hester MD (07/07/25 15:51:27, [facility], Emergency Medicine)    Sinus tachycardia, rate 113 beats per minute, normal CT interval,  milliseconds, no STEMI.                                  Imaging Results              X-Ray Chest AP Portable (Final result)  Result time 07/07/25 12:46:44      Final result by Aram Webster MD (07/07/25 12:46:44)                   Impression:      See above      Electronically signed by: Aram Webster MD  Date:    07/07/2025  Time:    12:46               Narrative:    EXAMINATION:  XR CHEST AP PORTABLE    CLINICAL HISTORY:  Other fatigue    TECHNIQUE:  Single frontal view of the chest was performed.    COMPARISON:  No 11/07/2023 ne    FINDINGS:  Postoperative changes identified in the thorax.  Heart size normal.  The lungs are clear.  No pleural effusion                                       Medications   sodium chloride 0.9% bolus 1,000 mL 1,000 mL (0 mLs Intravenous Stopped 7/7/25 1654)   potassium bicarbonate disintegrating tablet 20 mEq (20 mEq Oral Given 7/7/25 1414)   aspirin tablet 325 mg (325 mg Oral Given 7/7/25 1414)   potassium chloride SA CR tablet 40 mEq (40 mEq Oral Given 7/8/25 1111)   sodium phosphate 30 mmol in D5W 250 mL IVPB (0 mmol Intravenous Stopped 7/8/25 1928)   potassium bicarbonate disintegrating tablet 50 mEq (50 mEq Oral Given 7/8/25 1520)   potassium bicarbonate disintegrating tablet 50 mEq (50 mEq Oral Given 7/8/25 2149)   potassium, sodium phosphates 280-160-250 mg packet 2 packet (2 packets Oral Given 7/9/25 0945)     Medical Decision  Making  73 yo M presenting with orthostatic symptoms x 1 week, he has lost 18 lbs in 2 months after starting Mounjaro, started holding losartan 4 days ago due to low BP. Well appearing and NAD on exam. Suspect med side effect, dehydration, MIKE, electrolyte disturbance. Patient given IV fluids and placed in observation with HM for hydration. Trop very minimally elevated, could be related to MIKE.     Amount and/or Complexity of Data Reviewed  Labs: ordered. Decision-making details documented in ED Course.  Radiology: ordered. Decision-making details documented in ED Course.    Risk  OTC drugs.  Prescription drug management.  Decision regarding hospitalization.            Scribe Attestation:   Scribe #1: I performed the above scribed service and the documentation accurately describes the services I performed. I attest to the accuracy of the note.                               Clinical Impression:  Final diagnoses:  [R53.83] Fatigue  [E86.0] Dehydration (Primary)  [N17.9] MIKE (acute kidney injury)  [E87.6] Hypokalemia  [R79.89] Elevated troponin          ED Disposition Condition    Admit                I, Sri Hester MD, personally performed the services described in this documentation. All medical record entries made by the scribe were at my direction and in my presence. I have reviewed the chart and agree that the record reflects my personal performance and is accurate and complete.      DISCLAIMER: This note was prepared with [employer] voice recognition transcription software. Garbled syntax, mangled pronouns, and other bizarre constructions may be attributed to that software system.         [1]   Social History  Tobacco Use    Smoking status: Never    Smokeless tobacco: Never   Substance Use Topics    Alcohol use: No     Alcohol/week: 0.0 standard drinks of alcohol    Drug use: No        Sri Hester MD  07/15/25 1038

## 2025-07-07 NOTE — PLAN OF CARE
Case Management Assessment     PCP: Bi Hollins DO    Pharmacy: [facility]    Patient Arrived From: home with son  Existing Help at Home: son    Barriers to Discharge: none    Discharge Plan:    A. home   B. home with family      Discharge planning assessment completed with patient's assistance with son at the bedside.  When medically cleared, patient will discharge to home.  He will need f/u with his PCP and additional appts as ordered by the discharging provider.  Patient's son will provide transportation home.         07/07/25 6758   Discharge Assessment   Assessment Type Discharge Planning Assessment   Confirmed/corrected address, phone number and insurance Yes   Confirmed Demographics Correct on Facesheet   Source of Information patient   People in Home child(john), adult   Name(s) of People in Home Son:  Sherif Knutson  [phone]   Facility Arrived From: Home with son   Do you expect to return to your current living situation? Yes   Do you have help at home or someone to help you manage your care at home? Yes   Who are your caregiver(s) and their phone number(s)? son   Prior to hospitilization cognitive status: Alert/Oriented   Current cognitive status: Alert/Oriented   Walking or Climbing Stairs Difficulty yes   Walking or Climbing Stairs ambulation difficulty, requires equipment   Mobility Management RW   Dressing/Bathing Difficulty no   Equipment Currently Used at Home walker, rolling;glucometer   Readmission within 30 days? No   Patient currently being followed by outpatient case management? No   Do you currently have service(s) that help you manage your care at home? No   Do you take prescription medications? Yes   Do you have prescription coverage? Yes   Do you have any problems affording any of your prescribed medications? No   Is the patient taking medications as prescribed? yes   Who is going to help you get home at discharge? son   How do you get to doctors appointments? family or friend  will provide   Are you on dialysis? No   Do you take coumadin? No  (Plavix)   Discharge Plan A Home   Discharge Plan B Home with family   DME Needed Upon Discharge  none   Discharge Plan discussed with: Patient   Transition of Care Barriers None

## 2025-07-08 PROBLEM — R79.89 ELEVATED TROPONIN: Status: ACTIVE | Noted: 2025-07-08

## 2025-07-08 LAB
ABSOLUTE EOSINOPHIL (OHS): 0.13 K/UL
ABSOLUTE MONOCYTE (OHS): 0.94 K/UL (ref 0.3–1)
ABSOLUTE NEUTROPHIL COUNT (OHS): 6.08 K/UL (ref 1.8–7.7)
ALBUMIN SERPL BCP-MCNC: 3.2 G/DL (ref 3.5–5.2)
ALBUMIN SERPL BCP-MCNC: 3.4 G/DL (ref 3.5–5.2)
ALBUMIN SERPL BCP-MCNC: 3.5 G/DL (ref 3.5–5.2)
ALP SERPL-CCNC: 86 UNIT/L (ref 40–150)
ALT SERPL W/O P-5'-P-CCNC: 33 UNIT/L (ref 10–44)
ANION GAP (OHS): 14 MMOL/L (ref 8–16)
ANION GAP (OHS): 14 MMOL/L (ref 8–16)
ANION GAP (OHS): 8 MMOL/L (ref 8–16)
AORTIC SIZE INDEX (SOV): 1.7 CM/M2
AORTIC SIZE INDEX: 1.4 CM/M2
ASCENDING AORTA: 3.1 CM
AST SERPL-CCNC: 24 UNIT/L (ref 11–45)
AV INDEX (PROSTH): 0.96
AV MEAN GRADIENT: 3 MMHG
AV PEAK GRADIENT: 5 MMHG
AV VALVE AREA BY VELOCITY RATIO: 2.6 CM²
AV VALVE AREA: 3 CM²
AV VELOCITY RATIO: 0.82
BASOPHILS # BLD AUTO: 0.07 K/UL
BASOPHILS NFR BLD AUTO: 0.8 %
BILIRUB SERPL-MCNC: 0.9 MG/DL (ref 0.1–1)
BSA FOR ECHO PROCEDURE: 2.19 M2
BUN SERPL-MCNC: 35 MG/DL (ref 8–23)
BUN SERPL-MCNC: 40 MG/DL (ref 8–23)
BUN SERPL-MCNC: 45 MG/DL (ref 8–23)
CALCIUM SERPL-MCNC: 8.8 MG/DL (ref 8.7–10.5)
CALCIUM SERPL-MCNC: 9.1 MG/DL (ref 8.7–10.5)
CALCIUM SERPL-MCNC: 9.2 MG/DL (ref 8.7–10.5)
CHLORIDE SERPL-SCNC: 97 MMOL/L (ref 95–110)
CHLORIDE SERPL-SCNC: 98 MMOL/L (ref 95–110)
CHLORIDE SERPL-SCNC: 99 MMOL/L (ref 95–110)
CO2 SERPL-SCNC: 28 MMOL/L (ref 23–29)
CO2 SERPL-SCNC: 31 MMOL/L (ref 23–29)
CO2 SERPL-SCNC: 32 MMOL/L (ref 23–29)
CREAT SERPL-MCNC: 2.1 MG/DL (ref 0.5–1.4)
CREAT SERPL-MCNC: 2.2 MG/DL (ref 0.5–1.4)
CREAT SERPL-MCNC: 2.5 MG/DL (ref 0.5–1.4)
CV ECHO LV RWT: 0.6 CM
DOP CALC AO PEAK VEL: 1.1 M/S
DOP CALC AO VTI: 16.4 CM
DOP CALC LVOT AREA: 3.1 CM2
DOP CALC LVOT DIAMETER: 2 CM
DOP CALC LVOT PEAK VEL: 0.9 M/S
DOP CALC LVOT STROKE VOLUME: 49.3 CM3
DOP CALC MV VTI: 14.9 CM
DOP CALCLVOT PEAK VEL VTI: 15.7 CM
E WAVE DECELERATION TIME: 117 MSEC
E/A RATIO: 0.74
E/E' RATIO: 8 M/S
ECHO LV POSTERIOR WALL: 1.2 CM (ref 0.6–1.1)
ERYTHROCYTE [DISTWIDTH] IN BLOOD BY AUTOMATED COUNT: 13 % (ref 11.5–14.5)
FRACTIONAL SHORTENING: 37.5 % (ref 28–44)
GFR SERPLBLD CREATININE-BSD FMLA CKD-EPI: 26 ML/MIN/1.73/M2
GFR SERPLBLD CREATININE-BSD FMLA CKD-EPI: 31 ML/MIN/1.73/M2
GFR SERPLBLD CREATININE-BSD FMLA CKD-EPI: 33 ML/MIN/1.73/M2
GLUCOSE SERPL-MCNC: 117 MG/DL (ref 70–110)
GLUCOSE SERPL-MCNC: 192 MG/DL (ref 70–110)
GLUCOSE SERPL-MCNC: 198 MG/DL (ref 70–110)
HCT VFR BLD AUTO: 48.3 % (ref 40–54)
HGB BLD-MCNC: 15.6 GM/DL (ref 14–18)
HOLD SPECIMEN: NORMAL
IMM GRANULOCYTES # BLD AUTO: 0.02 K/UL (ref 0–0.04)
IMM GRANULOCYTES NFR BLD AUTO: 0.2 % (ref 0–0.5)
INTERVENTRICULAR SEPTUM: 1.2 CM (ref 0.6–1.1)
IVC DIAMETER: 1.93 CM
IVRT: 80 MSEC
LA MAJOR: 5.2 CM
LA MINOR: 4.7 CM
LA WIDTH: 3.8 CM
LEFT ATRIUM SIZE: 3.5 CM
LEFT ATRIUM VOLUME INDEX: 26 ML/M2
LEFT ATRIUM VOLUME: 56 CM3
LEFT INTERNAL DIMENSION IN SYSTOLE: 2.5 CM (ref 2.1–4)
LEFT VENTRICLE DIASTOLIC VOLUME INDEX: 32.26 ML/M2
LEFT VENTRICLE DIASTOLIC VOLUME: 70 ML
LEFT VENTRICLE MASS INDEX: 76.2 G/M2
LEFT VENTRICLE SYSTOLIC VOLUME INDEX: 10.6 ML/M2
LEFT VENTRICLE SYSTOLIC VOLUME: 23 ML
LEFT VENTRICULAR INTERNAL DIMENSION IN DIASTOLE: 4 CM (ref 3.5–6)
LEFT VENTRICULAR MASS: 165.5 G
LV LATERAL E/E' RATIO: 7.1 M/S
LV SEPTAL E/E' RATIO: 10 M/S
LVED V (TEICH): 69.85 ML
LVES V (TEICH): 22.9 ML
LVOT MG: 2.17 MMHG
LVOT MV: 0.71 CM/S
LYMPHOCYTES # BLD AUTO: 1.66 K/UL (ref 1–4.8)
MAGNESIUM SERPL-MCNC: 2.2 MG/DL (ref 1.6–2.6)
MCH RBC QN AUTO: 29.1 PG (ref 27–31)
MCHC RBC AUTO-ENTMCNC: 32.3 G/DL (ref 32–36)
MCV RBC AUTO: 90 FL (ref 82–98)
MV MEAN GRADIENT: 1 MMHG
MV PEAK A VEL: 0.68 M/S
MV PEAK E VEL: 0.5 M/S
MV PEAK GRADIENT: 2 MMHG
MV STENOSIS PRESSURE HALF TIME: 33.89 MS
MV VALVE AREA BY CONTINUITY EQUATION: 3.31 CM2
MV VALVE AREA P 1/2 METHOD: 6.49 CM2
NUCLEATED RBC (/100WBC) (OHS): 0 /100 WBC
OHS CV RV/LV RATIO: 0.93 CM
PHOSPHATE SERPL-MCNC: 1.5 MG/DL (ref 2.7–4.5)
PHOSPHATE SERPL-MCNC: 2.9 MG/DL (ref 2.7–4.5)
PHOSPHATE SERPL-MCNC: 3 MG/DL (ref 2.7–4.5)
PISA TR MAX VEL: 2.4 M/S
PLATELET # BLD AUTO: 140 K/UL (ref 150–450)
PMV BLD AUTO: 11.3 FL (ref 9.2–12.9)
POCT GLUCOSE: 235 MG/DL (ref 70–110)
POTASSIUM SERPL-SCNC: 2.5 MMOL/L (ref 3.5–5.1)
POTASSIUM SERPL-SCNC: 2.7 MMOL/L (ref 3.5–5.1)
POTASSIUM SERPL-SCNC: 3.3 MMOL/L (ref 3.5–5.1)
PROT SERPL-MCNC: 6.9 GM/DL (ref 6–8.4)
PV PEAK GRADIENT: 7 MMHG
PV PEAK VELOCITY: 1.32 M/S
RA MAJOR: 4.83 CM
RA PRESSURE ESTIMATED: 3 MMHG
RA WIDTH: 3.3 CM
RBC # BLD AUTO: 5.37 M/UL (ref 4.6–6.2)
RELATIVE EOSINOPHIL (OHS): 1.5 %
RELATIVE LYMPHOCYTE (OHS): 18.7 % (ref 18–48)
RELATIVE MONOCYTE (OHS): 10.6 % (ref 4–15)
RELATIVE NEUTROPHIL (OHS): 68.2 % (ref 38–73)
RIGHT VENTRICLE DIASTOLIC BASEL DIMENSION: 3.7 CM
RIGHT VENTRICULAR END-DIASTOLIC DIMENSION: 3.69 CM
RV TB RVSP: 5 MMHG
RV TISSUE DOPPLER FREE WALL SYSTOLIC VELOCITY 1 (APICAL 4 CHAMBER VIEW): 7.56 CM/S
SINUS: 3.6 CM
SODIUM SERPL-SCNC: 137 MMOL/L (ref 136–145)
SODIUM SERPL-SCNC: 141 MMOL/L (ref 136–145)
SODIUM SERPL-SCNC: 143 MMOL/L (ref 136–145)
STJ: 2.1 CM
TDI LATERAL: 0.07 M/S
TDI SEPTAL: 0.05 M/S
TDI: 0.06 M/S
TR MAX PG: 22 MMHG
TRICUSPID ANNULAR PLANE SYSTOLIC EXCURSION: 1.8 CM
TROPONIN I SERPL DL<=0.01 NG/ML-MCNC: 0.02 NG/ML
TROPONIN I SERPL DL<=0.01 NG/ML-MCNC: 0.03 NG/ML
TV REST PULMONARY ARTERY PRESSURE: 26 MMHG
WBC # BLD AUTO: 8.9 K/UL (ref 3.9–12.7)
Z-SCORE OF LEFT VENTRICULAR DIMENSION IN END DIASTOLE: -5.85
Z-SCORE OF LEFT VENTRICULAR DIMENSION IN END SYSTOLE: -4.42

## 2025-07-08 PROCEDURE — 36415 COLL VENOUS BLD VENIPUNCTURE: CPT | Performed by: NURSE PRACTITIONER

## 2025-07-08 PROCEDURE — 83735 ASSAY OF MAGNESIUM: CPT | Performed by: NURSE PRACTITIONER

## 2025-07-08 PROCEDURE — 84484 ASSAY OF TROPONIN QUANT: CPT | Performed by: NURSE PRACTITIONER

## 2025-07-08 PROCEDURE — 84484 ASSAY OF TROPONIN QUANT: CPT | Mod: 91 | Performed by: NURSE PRACTITIONER

## 2025-07-08 PROCEDURE — 99214 OFFICE O/P EST MOD 30 MIN: CPT | Mod: ,,, | Performed by: INTERNAL MEDICINE

## 2025-07-08 PROCEDURE — 12000002 HC ACUTE/MED SURGE SEMI-PRIVATE ROOM

## 2025-07-08 PROCEDURE — 96366 THER/PROPH/DIAG IV INF ADDON: CPT

## 2025-07-08 PROCEDURE — 80069 RENAL FUNCTION PANEL: CPT | Mod: 59 | Performed by: NURSE PRACTITIONER

## 2025-07-08 PROCEDURE — 82962 GLUCOSE BLOOD TEST: CPT

## 2025-07-08 PROCEDURE — 80069 RENAL FUNCTION PANEL: CPT | Mod: 91 | Performed by: STUDENT IN AN ORGANIZED HEALTH CARE EDUCATION/TRAINING PROGRAM

## 2025-07-08 PROCEDURE — 84100 ASSAY OF PHOSPHORUS: CPT | Performed by: STUDENT IN AN ORGANIZED HEALTH CARE EDUCATION/TRAINING PROGRAM

## 2025-07-08 PROCEDURE — 25000003 PHARM REV CODE 250: Performed by: NURSE PRACTITIONER

## 2025-07-08 PROCEDURE — 85025 COMPLETE CBC W/AUTO DIFF WBC: CPT | Performed by: NURSE PRACTITIONER

## 2025-07-08 PROCEDURE — 96361 HYDRATE IV INFUSION ADD-ON: CPT

## 2025-07-08 PROCEDURE — 63600175 PHARM REV CODE 636 W HCPCS: Performed by: NURSE PRACTITIONER

## 2025-07-08 PROCEDURE — 96365 THER/PROPH/DIAG IV INF INIT: CPT

## 2025-07-08 PROCEDURE — 84450 TRANSFERASE (AST) (SGOT): CPT | Performed by: NURSE PRACTITIONER

## 2025-07-08 RX ORDER — POTASSIUM CHLORIDE 20 MEQ/1
40 TABLET, EXTENDED RELEASE ORAL
Status: COMPLETED | OUTPATIENT
Start: 2025-07-08 | End: 2025-07-08

## 2025-07-08 RX ORDER — INSULIN GLARGINE 100 [IU]/ML
5 INJECTION, SOLUTION SUBCUTANEOUS NIGHTLY
Status: DISCONTINUED | OUTPATIENT
Start: 2025-07-08 | End: 2025-07-09 | Stop reason: HOSPADM

## 2025-07-08 RX ADMIN — SODIUM PHOSPHATE, MONOBASIC, MONOHYDRATE AND SODIUM PHOSPHATE, DIBASIC, ANHYDROUS 30 MMOL: 142; 276 INJECTION, SOLUTION INTRAVENOUS at 03:07

## 2025-07-08 RX ADMIN — PREGABALIN 300 MG: 50 CAPSULE ORAL at 09:07

## 2025-07-08 RX ADMIN — ASPIRIN 81 MG: 81 TABLET ORAL at 09:07

## 2025-07-08 RX ADMIN — PREGABALIN 300 MG: 50 CAPSULE ORAL at 12:07

## 2025-07-08 RX ADMIN — POTASSIUM CHLORIDE 40 MEQ: 1500 TABLET, EXTENDED RELEASE ORAL at 11:07

## 2025-07-08 RX ADMIN — CLOPIDOGREL 75 MG: 75 TABLET ORAL at 09:07

## 2025-07-08 RX ADMIN — POTASSIUM CHLORIDE 40 MEQ: 1500 TABLET, EXTENDED RELEASE ORAL at 09:07

## 2025-07-08 RX ADMIN — FAMOTIDINE 20 MG: 20 TABLET, FILM COATED ORAL at 09:07

## 2025-07-08 RX ADMIN — POTASSIUM BICARBONATE 50 MEQ: 977.5 TABLET, EFFERVESCENT ORAL at 03:07

## 2025-07-08 RX ADMIN — INSULIN GLARGINE 5 UNITS: 100 INJECTION, SOLUTION SUBCUTANEOUS at 10:07

## 2025-07-08 RX ADMIN — POTASSIUM BICARBONATE 50 MEQ: 977.5 TABLET, EFFERVESCENT ORAL at 09:07

## 2025-07-08 NOTE — HPI
Mr. Knutson is a 74-year-old male with past medical history of HTN, HLD, CAD s/p 5 vessel CABG, PCI x 3, systolic HF and DM who presented to [facility] ED 7/7/25 for evaluation of generalized weakness, fatigue and lightheadedness. Symptoms have been going on for 1 week. He admits of drinking adequate amount of water. He checked his blood pressure earlier today which was very low (SBP 70-80s) so he came to the ER. SBP  on ED arrival. Orthostatic vital signs were positive. Of note, he had creatinine of 3.0 with baseline creatinine around 1.5-2.0. He has been initiated on IV fluids. Initial troponin mildly elevated. EKG showed sinus tachycardia without new ST-T wave change. Patient denies chest pain, abdominal pain, SOB and LE edema on exam. Cardiology consulted. Patient placed in observation under hospital medicine.

## 2025-07-08 NOTE — ASSESSMENT & PLAN NOTE
-likely 2/2 demand in setting of hypotension and MIKE  - patient denies chest pain on exam   - trend troponin  - check TTE  -consult cardiology 2/2 cardiac history

## 2025-07-08 NOTE — DISCHARGE SUMMARY
[facility] Emergency Dept  [facility] Medicine  Discharge Summary      Patient Name: Sherif Knutson Jr.  MRN: [id number]  GREG: [id number]  Patient Class: IP- Inpatient  Admission Date: 7/7/2025  Hospital Length of Stay: 1 days  Discharge Date and Time: No discharge date for patient encounter.  Attending Physician: August Vail DO   Discharging Provider: Philly Green NP  Primary Care Provider: Bi Hollins DO    Primary Care Team: PHILLY GREEN    HPI:   Mr. Knutson is a 74-year-old male with past medical history of HTN, HLD, CAD s/p 5 vessel CABG, PCI x 3, systolic HF and DM who presented to [facility] ED 7/7/25 for evaluation of generalized weakness, fatigue and lightheadedness. Symptoms have been going on for 1 week. He admits of drinking adequate amount of water. He checked his blood pressure earlier today which was very low (SBP 70-80s) so he came to the ER. SBP  on ED arrival. Orthostatic vital signs were positive. Of note, he had creatinine of 3.0 with baseline creatinine around 1.5-2.0. He has been initiated on IV fluids. Initial troponin mildly elevated. EKG showed sinus tachycardia without new ST-T wave change. Patient denies chest pain, abdominal pain, SOB and LE edema on exam. Cardiology consulted. Patient placed in observation under hospital medicine.     * No surgery found *      Hospital Course:   Admission to the hospital for MIKE and hypokalemia felt due to volume depletion.  Renal function improved to baseline after IV fluid.  Hypokalemia resolved with replacement.  Phosphorus also low and replaced.  Patient tolerate diet no issues.  Continue to hold hydrochlorothiazide.  Also continue to hold mounjaro until seen by Endocrinology at [facility]. Refer to Nephrology for CKD. Follow up PCP and Endocrinology.       Goals of Care Treatment Preferences:  Code Status: Full Code         Consults:   Consults (From admission, onward)          Status Ordering Provider     Inpatient consult to Cardiology   Once        Provider:  Fatou Anton MD    Completed TIAN DUMONT            Assessment & Plan  MIKE (acute kidney injury)  MIKE is likely due to pre-renal azotemia due to dehydration. Baseline creatinine is 1.5-2.0. Most recent creatinine and eGFR are listed below.  Recent Labs     07/08/25  1224 07/08/25  1945 07/09/25  0602   CREATININE 2.1* 2.5* 2.0*   EGFRNORACEVR 33* 26* 35*      Plan  - MIKE is improving  - Avoid nephrotoxins and renally dose meds for GFR listed above  - Monitor urine output, serial BMP, and adjust therapy as needed    Orthostatic hypotension  -continue IVF     Dyslipidemia  -continue statin     Atherosclerosis of autologous vein coronary artery bypass graft with stable angina pectoris  See CAD  CHF (congestive heart failure)  - no evidence of decompensated HF on exam  - TTE pending     Coronary artery disease involving native coronary artery of native heart without angina pectoris  Patient with known CAD s/p stent placement and CABG, which is controlled Will continue ASA, Plavix, and Statin and monitor for S/Sx of angina/ACS. Continue to monitor on telemetry.   Hypertension  -patient states he is no longer taking any of his home antihypertensives 2/2 low BP readings   - continue to hold   Elevated troponin  -likely 2/2 demand in setting of hypotension and MIKE  - patient denies chest pain on exam   - trend troponin  - check TTE  -consult cardiology 2/2 cardiac history       Final Active Diagnoses:    Diagnosis Date Noted POA    PRINCIPAL PROBLEM:  MIKE (acute kidney injury) [N17.9] 11/08/2023 Yes    Elevated troponin [R79.89] 07/08/2025 Yes    Hypertension [I10] 06/30/2020 Yes    Coronary artery disease involving native coronary artery of native heart without angina pectoris [I25.10] 05/06/2018 Yes    CHF (congestive heart failure) [I50.9] 08/29/2017 Yes    Orthostatic hypotension [I95.1] 07/14/2014 Yes    Atherosclerosis of autologous vein coronary artery bypass graft with stable  angina pectoris [I25.718] 07/14/2014 Yes    Dyslipidemia [E78.5] 07/25/2013 Yes      Problems Resolved During this Admission:       Discharged Condition: stable    Disposition: Home or Self Care    Follow Up:   Follow-up Information       Bi Hollins DO. Go to.    Specialty: Family Medicine  Why: Hospital Followup on 7/14/25 @10:00am  Contact information:  [address] [place] [address] [id number] [phone]                           Patient Instructions:      Ambulatory referral/consult to Nephrology   Standing Status: Future   Referral Priority: Routine Referral Type: Consultation   Referral Reason: Specialty Services Required   Requested Specialty: Nephrology   Number of Visits Requested: 1     Diet Cardiac     Diet diabetic     Notify your health care provider if you experience any of the following:  temperature >100.4     Notify your health care provider if you experience any of the following:  difficulty breathing or increased cough     Notify your health care provider if you experience any of the following:  increased confusion or weakness     Notify your health care provider if you experience any of the following:  temperature >100.4     Notify your health care provider if you experience any of the following:  difficulty breathing or increased cough     Notify your health care provider if you experience any of the following:  increased confusion or weakness     Activity as tolerated     Activity as tolerated       Significant Diagnostic Studies: N/A    Pending Diagnostic Studies:       None           Medications:  Reconciled Home Medications:      Medication List        CHANGE how you take these medications      rosuvastatin 40 MG Tab  Commonly known as: CRESTOR  Take 40 mg by mouth once daily.  What changed: Another medication with the same name was removed. Continue taking this medication, and follow the directions you see here.            CONTINUE taking these medications      aspirin 81 MG EC  "tablet  Commonly known as: ECOTRIN  Take 81 mg by mouth once daily.     azelastine 137 mcg (0.1 %) nasal spray  Commonly known as: ASTELIN  Two sprays each nostril twice daily, sniff full until absorbed, then follow with fluticasone nasal spray     BD ULTRA-FINE JERMAINE PEN NEEDLE 32 gauge x 5/32" Ndle  Generic drug: pen needle, diabetic     clopidogreL 75 mg tablet  Commonly known as: PLAVIX  Take 1 tablet (75 mg total) by mouth every evening.     COZAAR 50 mg tablet  Generic drug: losartan  Take 50 mg by mouth once daily.     DULoxetine 30 MG capsule  Commonly known as: CYMBALTA  Take 30 mg by mouth once daily.     EASY TOUCH 31 gauge x 5/16" Ndle  Generic drug: pen needle, diabetic     famotidine 20 MG tablet  Commonly known as: PEPCID  Take 20 mg by mouth 2 (two) times daily.     LANTUS SOLOSTAR U-100 INSULIN SUBQ  Inject 30 Units into the skin once daily.     metoprolol succinate 25 MG 24 hr tablet  Commonly known as: TOPROL-XL  Take 1 tablet (25 mg total) by mouth once daily.     MOUNJARO 7.5 mg/0.5 mL Pnij  Generic drug: tirzepatide  Inject 7.5 mg into the skin every 7 days.     nitroGLYCERIN 0.4 MG SL tablet  Commonly known as: NITROSTAT  Place 1 tab under the tongue for chest pain every 5 minutes x 3; if chest pain not relieved after 3 doses go to the ED     oxyCODONE-acetaminophen  mg per tablet  Commonly known as: PERCOCET  Take 1 tablet by mouth every 6 (six) hours as needed for Pain.     pregabalin 300 MG Cap  Commonly known as: LYRICA  2 (two) times a day.     SURE COMFORT INSULIN SYRINGE 0.5 mL 31 gauge x 5/16" Syrg  Generic drug: insulin syringe-needle U-100     VITAMIN B-12 ORAL  Take by mouth.            STOP taking these medications      hydroCHLOROthiazide 25 MG tablet  Commonly known as: HYDRODIURIL              Indwelling Lines/Drains at time of discharge:   Lines/Drains/Airways       None                       Time spent on the discharge of patient: 35 minutes         Philly Knox" NP  Department of Hospital Medicine  [facility] [place] - Emergency Dept

## 2025-07-08 NOTE — H&P
[facility] Emergency Dept  [facility] Medicine  History & Physical    Patient Name: Sherif Knutson Jr.  MRN: [id number]  Patient Class: OP- Observation  Admission Date: 7/7/2025  Attending Physician: Bryan Lehman MD   Primary Care Provider: Bi Hollins DO         Patient information was obtained from patient, past medical records, and ER records.     Subjective:     Principal Problem:MIKE (acute kidney injury)    Chief Complaint:   Chief Complaint   Patient presents with    Weakness     73 yo male to triage for weakness, hypotension, and feeling lightheaded for about a week. Denies SOB and CP, N/V/D. VSS, NAD    Hypotension        HPI: Mr. Knutson is a 74-year-old male with past medical history of HTN, HLD, CAD s/p 5 vessel CABG, PCI x 3, systolic HF and DM who presented to [facility] ED 7/7/25 for evaluation of generalized weakness, fatigue and lightheadedness. Symptoms have been going on for 1 week. He admits of drinking adequate amount of water. He checked his blood pressure earlier today which was very low (SBP 70-80s) so he came to the ER. SBP  on ED arrival. Orthostatic vital signs were positive. Of note, he had creatinine of 3.0 with baseline creatinine around 1.5-2.0. He has been initiated on IV fluids. Initial troponin mildly elevated. EKG showed sinus tachycardia without new ST-T wave change. Patient denies chest pain, abdominal pain, SOB and LE edema on exam. Cardiology consulted. Patient placed in observation under hospital medicine.     Past Medical History:   Diagnosis Date    CHF (congestive heart failure) 8/29/2017    Coronary artery disease 2006, 5/2013    s/p pci to rca (2006), LCx (5/2013)    DM (diabetes mellitus)     HLD (hyperlipidemia)     HTN (hypertension)     Kidney stone     EMELI (obstructive sleep apnea) 11/2/2015    Subclavian artery stenosis, left     Syncope and collapse 11/8/2023       Past Surgical History:   Procedure Laterality Date    BACK SURGERY      CHOLECYSTECTOMY  2011     "CORONARY ANGIOPLASTY      CORONARY ARTERY BYPASS GRAFT  2000    5 bypass surgeries    SPINE SURGERY  1980    TONSILLECTOMY         Review of patient's allergies indicates:   Allergen Reactions    Sulfa (sulfonamide antibiotics) Photosensitivity    Zocor [simvastatin] Other (See Comments)     lathargic       No current facility-administered medications on file prior to encounter.     Current Outpatient Medications on File Prior to Encounter   Medication Sig    aspirin (ECOTRIN) 81 MG EC tablet Take 81 mg by mouth once daily.    clopidogreL (PLAVIX) 75 mg tablet Take 1 tablet (75 mg total) by mouth every evening.    DULoxetine (CYMBALTA) 30 MG capsule Take 30 mg by mouth once daily.    hydroCHLOROthiazide (HYDRODIURIL) 25 MG tablet Take 25 mg by mouth 2 (two) times daily.    insulin glargine,hum.rec.anlog (LANTUS SOLOSTAR U-100 INSULIN SUBQ) Inject 30 Units into the skin once daily.    metoprolol succinate (TOPROL-XL) 25 MG 24 hr tablet Take 1 tablet (25 mg total) by mouth once daily.    pregabalin (LYRICA) 300 MG Cap 2 (two) times a day.    rosuvastatin (CRESTOR) 40 MG Tab Take 40 mg by mouth once daily.    tirzepatide (MOUNJARO) 7.5 mg/0.5 mL PnIj Inject 7.5 mg into the skin every 7 days.    azelastine (ASTELIN) 137 mcg (0.1 %) nasal spray Two sprays each nostril twice daily, sniff full until absorbed, then follow with fluticasone nasal spray    BD ULTRA-FINE JERMAINE PEN NEEDLES 32 gauge x 5/32" Ndle     COZAAR 50 mg tablet Take 50 mg by mouth once daily.    CYANOCOBALAMIN, VITAMIN B-12, (VITAMIN B-12 ORAL) Take by mouth.    EASY TOUCH 31 X 5/16 " Ndle     famotidine (PEPCID) 20 MG tablet Take 20 mg by mouth 2 (two) times daily.    nitroGLYCERIN (NITROSTAT) 0.4 MG SL tablet Place 1 tab under the tongue for chest pain every 5 minutes x 3; if chest pain not relieved after 3 doses go to the ED    oxycodone-acetaminophen (PERCOCET)  mg per tablet Take 1 tablet by mouth every 6 (six) hours as needed for Pain.    SURE " "COMFORT INSULIN SYRINGE 1/2 mL 31 x 5/16" Syrg      Family History       Problem Relation (Age of Onset)    Heart attack Mother, Father    Heart disease Mother, Father, Paternal Uncle    Hypertension Brother          Tobacco Use    Smoking status: Never    Smokeless tobacco: Never   Substance and Sexual Activity    Alcohol use: No     Alcohol/week: 0.0 standard drinks of alcohol    Drug use: No    Sexual activity: Yes     Partners: Female     Review of Systems   Constitutional:  Positive for activity change and fatigue. Negative for chills and fever.   Eyes:  Negative for photophobia.   Respiratory:  Negative for cough, chest tightness, shortness of breath and wheezing.    Cardiovascular:  Negative for chest pain, palpitations and leg swelling.   Gastrointestinal:  Negative for abdominal pain, diarrhea, nausea and vomiting.   Genitourinary:  Negative for dysuria, flank pain and hematuria.   Musculoskeletal:  Negative for back pain, myalgias and neck pain.   Skin:  Negative for rash and wound.   Neurological:  Positive for weakness and light-headedness. Negative for dizziness, syncope and headaches.   Psychiatric/Behavioral:  Negative for agitation.      Objective:     Vital Signs (Most Recent):  Temp: 97.4 °F (36.3 °C) (07/07/25 1923)  Pulse: 90 (07/08/25 0000)  Resp: (!) 39 (07/07/25 2152)  BP: (!) 99/50 (07/08/25 0000)  SpO2: (!) 90 % (07/08/25 0000) Vital Signs (24h Range):  Temp:  [97.4 °F (36.3 °C)-97.7 °F (36.5 °C)] 97.4 °F (36.3 °C)  Pulse:  [] 90  Resp:  [14-39] 39  SpO2:  [89 %-95 %] 90 %  BP: ()/(46-71) 99/50     Weight: 94.8 kg (209 lb)  Body mass index is 28.35 kg/m².     Physical Exam  Constitutional:       General: He is not in acute distress.     Appearance: He is well-developed.   HENT:      Head: Normocephalic and atraumatic.   Eyes:      Conjunctiva/sclera: Conjunctivae normal.      Pupils: Pupils are equal, round, and reactive to light.   Neck:      Vascular: No JVD.   Cardiovascular: "      Rate and Rhythm: Normal rate and regular rhythm.      Heart sounds: Normal heart sounds.   Pulmonary:      Effort: Pulmonary effort is normal. No respiratory distress.      Breath sounds: Normal breath sounds. No wheezing.   Abdominal:      General: Bowel sounds are normal. There is no distension.      Palpations: Abdomen is soft.      Tenderness: There is no abdominal tenderness. There is no guarding.   Musculoskeletal:         General: No tenderness. Normal range of motion.      Cervical back: Normal range of motion and neck supple.   Skin:     General: Skin is warm and dry.      Capillary Refill: Capillary refill takes less than 2 seconds.      Findings: No erythema.   Neurological:      Mental Status: He is alert and oriented to person, place, and time.      Comments: Lethargic    Psychiatric:         Behavior: Behavior normal.              CRANIAL NERVES     CN III, IV, VI   Pupils are equal, round, and reactive to light.       Significant Labs: All pertinent labs within the past 24 hours have been reviewed.  CBC:   Recent Labs   Lab 07/07/25  1233   WBC 12.32   HGB 17.7   HCT 52.2        CMP:   Recent Labs   Lab 07/07/25  1233 07/07/25  1835   * 139   K 2.8* 3.1*   CL 90* 93*   CO2 29 30*   * 209*   BUN 56* 53*   CREATININE 3.0* 2.8*   CALCIUM 11.0* 9.6   PROT 8.5* 7.2   ALBUMIN 4.2 3.5   BILITOT 1.6* 1.2*   ALKPHOS 111 93   AST 35 29   ALT 45* 37   ANIONGAP 16 16     Cardiac Markers:   Recent Labs   Lab 07/07/25  1233   BNP 27     Troponin:   Recent Labs   Lab 07/07/25  1233 07/07/25  1835   TROPONINI 0.035* 0.031*     Urine Studies:   Recent Labs   Lab 07/07/25  1429   COLORU Yellow   APPEARANCEUA Clear   PHUR 6.0   SPECGRAV 1.020   PROTEINUA 2+*   GLUCUA Trace*   BILIRUBINUA Negative   OCCULTUA 2+*   NITRITE Negative   UROBILINOGEN Negative   LEUKOCYTESUR Negative   RBCUA 0   WBCUA 1   BACTERIA Rare   HYALINECASTS 29*       Significant Imaging: I have reviewed all pertinent imaging  results/findings within the past 24 hours.  Assessment/Plan:     Assessment & Plan  MIKE (acute kidney injury)  MIKE is likely due to pre-renal azotemia due to dehydration. Baseline creatinine is 1.5-2.0. Most recent creatinine and eGFR are listed below.  Recent Labs     07/07/25  1233 07/07/25  1835   CREATININE 3.0* 2.8*   EGFRNORACEVR 21* 23*      Plan  - MIKE is improving  - Avoid nephrotoxins and renally dose meds for GFR listed above  - Monitor urine output, serial BMP, and adjust therapy as needed    Orthostatic hypotension  -continue IVF     Dyslipidemia  -continue statin     Atherosclerosis of autologous vein coronary artery bypass graft with stable angina pectoris  See CAD  CHF (congestive heart failure)  - no evidence of decompensated HF on exam  - TTE pending     Coronary artery disease involving native coronary artery of native heart without angina pectoris  Patient with known CAD s/p stent placement and CABG, which is controlled Will continue ASA, Plavix, and Statin and monitor for S/Sx of angina/ACS. Continue to monitor on telemetry.   Hypertension  -patient states he is no longer taking any of his home antihypertensives 2/2 low BP readings   - continue to hold   Elevated troponin  -likely 2/2 demand in setting of hypotension and MIKE  - patient denies chest pain on exam   - trend troponin  - check TTE  -consult cardiology 2/2 cardiac history     VTE Risk Mitigation (From admission, onward)           Ordered     IP VTE HIGH RISK PATIENT  Once         07/07/25 1531     Place sequential compression device  Until discontinued         07/07/25 1531                                     On 07/08/2025, patient should be placed in hospital observation services under my care in collaboration with Dr. Lehman.           Cher Varela, NP  Department of Hospital Medicine  [facility] [place] [facility]

## 2025-07-08 NOTE — SUBJECTIVE & OBJECTIVE
"Past Medical History:   Diagnosis Date    CHF (congestive heart failure) 8/29/2017    Coronary artery disease 2006, 5/2013    s/p pci to rca (2006), LCx (5/2013)    DM (diabetes mellitus)     HLD (hyperlipidemia)     HTN (hypertension)     Kidney stone     EMELI (obstructive sleep apnea) 11/2/2015    Subclavian artery stenosis, left     Syncope and collapse 11/8/2023       Past Surgical History:   Procedure Laterality Date    BACK SURGERY      CHOLECYSTECTOMY  2011    CORONARY ANGIOPLASTY      CORONARY ARTERY BYPASS GRAFT  2000    5 bypass surgeries    SPINE SURGERY  1980    TONSILLECTOMY         Review of patient's allergies indicates:   Allergen Reactions    Sulfa (sulfonamide antibiotics) Photosensitivity    Zocor [simvastatin] Other (See Comments)     lathargic       No current facility-administered medications on file prior to encounter.     Current Outpatient Medications on File Prior to Encounter   Medication Sig    aspirin (ECOTRIN) 81 MG EC tablet Take 81 mg by mouth once daily.    clopidogreL (PLAVIX) 75 mg tablet Take 1 tablet (75 mg total) by mouth every evening.    DULoxetine (CYMBALTA) 30 MG capsule Take 30 mg by mouth once daily.    hydroCHLOROthiazide (HYDRODIURIL) 25 MG tablet Take 25 mg by mouth 2 (two) times daily.    insulin glargine,hum.rec.anlog (LANTUS SOLOSTAR U-100 INSULIN SUBQ) Inject 30 Units into the skin once daily.    metoprolol succinate (TOPROL-XL) 25 MG 24 hr tablet Take 1 tablet (25 mg total) by mouth once daily.    pregabalin (LYRICA) 300 MG Cap 2 (two) times a day.    rosuvastatin (CRESTOR) 40 MG Tab Take 40 mg by mouth once daily.    tirzepatide (MOUNJARO) 7.5 mg/0.5 mL PnIj Inject 7.5 mg into the skin every 7 days.    azelastine (ASTELIN) 137 mcg (0.1 %) nasal spray Two sprays each nostril twice daily, sniff full until absorbed, then follow with fluticasone nasal spray    BD ULTRA-FINE JERMAINE PEN NEEDLES 32 gauge x 5/32" Ndle     COZAAR 50 mg tablet Take 50 mg by mouth once daily. " "   CYANOCOBALAMIN, VITAMIN B-12, (VITAMIN B-12 ORAL) Take by mouth.    EASY TOUCH 31 X 5/16 " Ndle     famotidine (PEPCID) 20 MG tablet Take 20 mg by mouth 2 (two) times daily.    nitroGLYCERIN (NITROSTAT) 0.4 MG SL tablet Place 1 tab under the tongue for chest pain every 5 minutes x 3; if chest pain not relieved after 3 doses go to the ED    oxycodone-acetaminophen (PERCOCET)  mg per tablet Take 1 tablet by mouth every 6 (six) hours as needed for Pain.    SURE COMFORT INSULIN SYRINGE 1/2 mL 31 x 5/16" Syrg     [DISCONTINUED] metFORMIN (GLUCOPHAGE-XR) 750 MG ER 24hr tablet Take 750 mg by mouth 2 (two) times daily.     Family History       Problem Relation (Age of Onset)    Heart attack Mother, Father    Heart disease Mother, Father, Paternal Uncle    Hypertension Brother          Tobacco Use    Smoking status: Never    Smokeless tobacco: Never   Substance and Sexual Activity    Alcohol use: No     Alcohol/week: 0.0 standard drinks of alcohol    Drug use: No    Sexual activity: Yes     Partners: Female     Review of Systems   Cardiovascular:  Negative for chest pain, claudication, dyspnea on exertion, irregular heartbeat, leg swelling, near-syncope, orthopnea, palpitations, paroxysmal nocturnal dyspnea and syncope.   Respiratory:  Negative for cough, shortness of breath, snoring and sputum production.    Gastrointestinal:  Negative for abdominal pain, dysphagia, heartburn, nausea and vomiting.   Neurological:  Positive for dizziness and weakness. Negative for headaches and loss of balance.     Objective:     Vital Signs (Most Recent):  Temp: 97.7 °F (36.5 °C) (07/07/25 1157)  Pulse: 82 (07/07/25 1800)  Resp: 14 (07/07/25 1348)  BP: (!) 113/59 (07/07/25 1700)  SpO2: (!) 91 % (07/07/25 1800) Vital Signs (24h Range):  Temp:  [97.7 °F (36.5 °C)] 97.7 °F (36.5 °C)  Pulse:  [] 82  Resp:  [14-17] 14  SpO2:  [91 %-95 %] 91 %  BP: ()/(55-71) 113/59     Weight: 94.8 kg (209 lb)  Body mass index is 28.35 " kg/m².    SpO2: (!) 91 %         Intake/Output Summary (Last 24 hours) at 7/7/2025 1900  Last data filed at 7/7/2025 1654  Gross per 24 hour   Intake 1000 ml   Output --   Net 1000 ml       Lines/Drains/Airways       Peripheral Intravenous Line  Duration             Peripheral IV Single Lumen 07/07/25 1234 20 G Anterior;Right Forearm <1 day                     Physical Exam  HENT:      Head: Normocephalic and atraumatic.      Mouth/Throat:      Mouth: Mucous membranes are moist.   Eyes:      Extraocular Movements: Extraocular movements intact.      Pupils: Pupils are equal, round, and reactive to light.   Cardiovascular:      Rate and Rhythm: Normal rate and regular rhythm.      Pulses: Normal pulses.      Heart sounds: Normal heart sounds.   Pulmonary:      Effort: Pulmonary effort is normal.      Breath sounds: Normal breath sounds.   Abdominal:      General: Bowel sounds are normal.      Palpations: Abdomen is soft.   Musculoskeletal:      Left lower leg: No edema.   Skin:     General: Skin is warm.   Neurological:      General: No focal deficit present.      Mental Status: He is alert.   Psychiatric:         Mood and Affect: Mood normal.         Behavior: Behavior normal.          Current Medications:   [START ON 7/8/2025] aspirin  81 mg Oral Daily    [START ON 7/8/2025] clopidogreL  75 mg Oral QHS    [START ON 7/8/2025] famotidine  20 mg Oral Q48H    pregabalin  300 mg Oral BID      0.9% NaCl   Intravenous Continuous 75 mL/hr at 07/07/25 1703 New Bag at 07/07/25 1703       Current Facility-Administered Medications:     acetaminophen, 650 mg, Oral, Q4H PRN    albuterol-ipratropium, 3 mL, Nebulization, Q4H PRN    melatonin, 6 mg, Oral, Nightly PRN    naloxone, 0.02 mg, Intravenous, PRN    ondansetron, 4 mg, Intravenous, Q8H PRN    senna-docusate, 1 tablet, Oral, BID PRN    simethicone, 1 tablet, Oral, QID PRN    sodium chloride 0.9%, 10 mL, Intravenous, Q8H PRN    Laboratory (all labs reviewed):  CBC:  Recent  Labs   Lab 11/08/23 0138 07/07/25  1233   WBC 7.23 12.32   Hemoglobin 12.8 L  --    HGB  --  17.7   Hematocrit 41.8  --    HCT  --  52.2   Platelet Count  --  158   Platelets 159  --        CHEMISTRIES:  Recent Labs   Lab 11/07/23 2223 11/08/23 0138 11/08/23 0138 11/08/23  1702 10/22/24  1955 01/29/25 2032 01/31/25  0856 02/24/25  1126 04/09/25  1133 05/21/25  1222 07/07/25  1233   Glucose  --  129 H  --  247 H  --   --   --   --  243 H  --  232 H   Sodium  --  140   < > 140   < > 136 137 132 L 137 139 135 L   Potassium  --  4.5   < > 4.0   < > 4.1 3.9 5.2 3.6  --  2.8 L   BUN  --  29 H   < > 27 H   < > 65.1 H 40.7 H 41.2 H  --  27.1 H 56 H   Blood Urea Nitrogen  --   --   --   --   --   --   --   --  45 H  --   --    Creatinine  --  2.5 H   < > 2.1 H   < > 2.43 H 1.58 H 1.83 H 2.02 H 1.86 H 3.0 H   eGFR  --  27 A   < > 33 A   < > 27 L 46 L 38 L 34 L 38 L 21 L   Calcium  --  8.6 L   < > 8.9   < > 11.1 H 10.1 9.3 9.9 9.0 11.0 H   Magnesium   --   --   --   --   --   --   --   --   --   --  2.3   Magnesium 2.2 2.1  --   --   --   --   --   --   --   --   --     < > = values in this interval not displayed.       CARDIAC BIOMARKERS:  Recent Labs   Lab 07/07/25  1233    H   Troponin-I 0.035 H       COAGS:  Recent Labs   Lab 09/14/22  1119 11/07/23  2223 11/15/24  1048 02/24/25  1126   INR 1.1 1.7 H 1.1 1.1       LIPIDS/LFTS:  Recent Labs   Lab 11/08/23 0138 10/22/24  1955 01/29/25  2032 02/24/25  1126 05/21/25  1222 07/07/25  1233   Cholesterol  --   --   --   --  106  --    Triglycerides  --   --   --   --  142  --    HDL  --   --   --   --  50  --    LDL Calculated  --   --   --   --  28  --    Non-HDL Cholesterol  --   --   --   --  56  --    AST 20 12 27 20  --  35   ALT 22 16 38 35 18 45 H       BNP:  Recent Labs   Lab 07/07/25  1233   BNP 27       TSH:        Free T4:        Diagnostic Results:  ECG (personally reviewed and interpreted tracing(s)):  7 July 2025  Sinus tachycardia, right axis deviation  with poor R-wave progression    Chest X-Ray (personally reviewed and interpreted image(s)):  No acute cardiopulmonary process    Echo: 11/2023    Left Ventricle: Normal wall motion. There is normal systolic function with a visually estimated ejection fraction of 65 - 70%. Grade I diastolic dysfunction.    Right Ventricle: Normal right ventricular cavity size. Systolic function is normal.    Left Atrium: Left atrium is moderately dilated.    Right Atrium: Right atrium is mildly dilated.    Tricuspid Valve: There is mild regurgitation.    Pulmonary Artery: The estimated pulmonary artery systolic pressure is 14 mmHg.    IVC/SVC: Normal venous pressure at 3 mmHg.    Bubble study positive for right-to-left shunt    Stress Test:  Stress test in January, 2025  Myocardial perfusion imaging is mildly abnormal.    There is a small area of infarction in the Anterolateral region.    Mildly reduced left ventricular systolic function.    Abnormal stress ECG.    Ischemia- Negative with baseine abnormalities   Arrythmia- Negative     Cath:  Cardiac catheterization in April, 2018  Left main coronary artery with mild luminal irregularity  Proximal LAD had 80% stenosis followed by chronic total occlusion in midportion  First diagonal artery had 75% stenosis  2nd diagonal artery had chronic total occlusion  Proximal left circumflex artery had 60% stenosis  OM1 and OM2 arteries had minimal luminal irregularities  OM 4had 60% stenosis  RCA had mild luminal irregularities    Weems to LAD was patent and known occlusions of SVG to D1, SVG to OM, SVG to RCA    Subsequent PCIs to RCA in 2006, left circumflex artery in 2013 and OM 4 in 2017

## 2025-07-08 NOTE — SUBJECTIVE & OBJECTIVE
Review of Systems   Cardiovascular:  Negative for chest pain, claudication, dyspnea on exertion, irregular heartbeat, leg swelling, near-syncope, orthopnea, palpitations, paroxysmal nocturnal dyspnea and syncope.   Respiratory:  Negative for cough, shortness of breath, snoring and sputum production.    Gastrointestinal:  Negative for abdominal pain, dysphagia, heartburn, nausea and vomiting.   Neurological:  Negative for dizziness, headaches, loss of balance and weakness.     Objective:     Vital Signs (Most Recent):  Temp: 98.1 °F (36.7 °C) (07/08/25 0831)  Pulse: 87 (07/08/25 0800)  Resp: 18 (07/08/25 0045)  BP: (!) 111/59 (07/08/25 0800)  SpO2: (!) 94 % (07/08/25 0800) Vital Signs (24h Range):  Temp:  [97.4 °F (36.3 °C)-98.1 °F (36.7 °C)] 98.1 °F (36.7 °C)  Pulse:  [] 87  Resp:  [14-39] 18  SpO2:  [89 %-95 %] 94 %  BP: ()/(46-71) 111/59     Weight: 94.8 kg (209 lb)  Body mass index is 28.35 kg/m².     SpO2: (!) 94 %         Intake/Output Summary (Last 24 hours) at 7/8/2025 0840  Last data filed at 7/8/2025 0416  Gross per 24 hour   Intake 1240 ml   Output 1400 ml   Net -160 ml       Lines/Drains/Airways       Peripheral Intravenous Line  Duration             Peripheral IV Single Lumen 07/07/25 1234 20 G Anterior;Right Forearm <1 day                       Physical Exam  HENT:      Head: Normocephalic and atraumatic.      Mouth/Throat:      Mouth: Mucous membranes are moist.   Eyes:      Extraocular Movements: Extraocular movements intact.      Pupils: Pupils are equal, round, and reactive to light.   Cardiovascular:      Rate and Rhythm: Normal rate and regular rhythm.      Pulses: Normal pulses.      Heart sounds: Normal heart sounds.   Pulmonary:      Effort: Pulmonary effort is normal.      Breath sounds: Normal breath sounds.   Abdominal:      General: Bowel sounds are normal.      Palpations: Abdomen is soft.   Musculoskeletal:      Left lower leg: No edema.   Skin:     General: Skin is warm.    Neurological:      General: No focal deficit present.      Mental Status: He is alert.   Psychiatric:         Mood and Affect: Mood normal.         Behavior: Behavior normal.          Current Medications:   aspirin  81 mg Oral Daily    clopidogreL  75 mg Oral QHS    famotidine  20 mg Oral Q48H    potassium chloride  40 mEq Oral Q2H    pregabalin  300 mg Oral BID      0.9% NaCl   Intravenous Continuous 75 mL/hr at 07/07/25 1703 New Bag at 07/07/25 1703       Current Facility-Administered Medications:     acetaminophen, 650 mg, Oral, Q4H PRN    albuterol-ipratropium, 3 mL, Nebulization, Q4H PRN    melatonin, 6 mg, Oral, Nightly PRN    naloxone, 0.02 mg, Intravenous, PRN    ondansetron, 4 mg, Intravenous, Q8H PRN    senna-docusate, 1 tablet, Oral, BID PRN    simethicone, 1 tablet, Oral, QID PRN    sodium chloride 0.9%, 10 mL, Intravenous, Q8H PRN    Laboratory (all labs reviewed):  CBC:  Recent Labs   Lab 11/08/23  0138 07/07/25  1233 07/08/25  0706   WBC 7.23 12.32 8.90   Hemoglobin 12.8 L  --   --    HGB  --  17.7 15.6   Hematocrit 41.8  --   --    HCT  --  52.2 48.3   Platelet Count  --  158 140 L   Platelets 159  --   --        CHEMISTRIES:  Recent Labs   Lab 11/07/23  2223 11/08/23  0138 11/08/23  0138 11/08/23  1702 10/22/24  1955 02/24/25  1126 04/09/25  1133 05/21/25  1222 07/07/25  1233 07/07/25  1835 07/08/25  0706   Glucose  --  129 H   < > 247 H  --   --  243 H  --  232 H 209 H 117 H   Sodium  --  140   < > 140   < > 132 L 137 139 135 L 139 141   Potassium  --  4.5   < > 4.0   < > 5.2 3.6  --  2.8 L 3.1 L 2.5 LL   BUN  --  29 H   < > 27 H   < > 41.2 H  --  27.1 H 56 H 53 H 45 H   Blood Urea Nitrogen  --   --   --   --   --   --  45 H  --   --   --   --    Creatinine  --  2.5 H   < > 2.1 H   < > 1.83 H 2.02 H 1.86 H 3.0 H 2.8 H 2.2 H   eGFR  --  27 A   < > 33 A   < > 38 L 34 L 38 L 21 L 23 L 31 L   Calcium  --  8.6 L   < > 8.9   < > 9.3 9.9 9.0 11.0 H 9.6 9.1   Magnesium   --   --   --   --   --   --   --    --  2.3 2.3 2.2   Magnesium 2.2 2.1  --   --   --   --   --   --   --   --   --     < > = values in this interval not displayed.       CARDIAC BIOMARKERS:  Recent Labs   Lab 07/07/25  1233 07/07/25 1835 07/08/25  0000 07/08/25  0458    H  --   --   --    Troponin-I 0.035 H 0.031 H 0.026 0.023       COAGS:  Recent Labs   Lab 09/14/22  1119 11/07/23  2223 11/15/24  1048 02/24/25  1126   INR 1.1 1.7 H 1.1 1.1       LIPIDS/LFTS:  Recent Labs   Lab 01/29/25 2032 02/24/25  1126 05/21/25  1222 07/07/25  1233 07/07/25  1835 07/08/25  0706   Cholesterol Total  --   --   --   --  85 L  --    Cholesterol  --   --  106  --   --   --    Triglycerides  --   --  142  --   --   --    Triglyceride  --   --   --   --  153 H  --    HDL  --   --  50  --   --   --    HDL Cholesterol  --   --   --   --  33 L  --    LDL Cholesterol  --   --   --   --  21.4 L  --    LDL Calculated  --   --  28  --   --   --    Non-HDL Cholesterol  --   --  56  --   --   --    Non HDL Cholesterol  --   --   --   --  52  --    AST 27 20  --  35 29 24   ALT 38 35 18 45 H 37 33       BNP:  Recent Labs   Lab 07/07/25  1233   BNP 27       TSH:        Free T4:

## 2025-07-08 NOTE — HOSPITAL COURSE
Patient was seen and examined this morning.  No more lightheadedness or dizziness  Creatinine is trending down towards baseline  Troponin has come to normal level

## 2025-07-08 NOTE — ASSESSMENT & PLAN NOTE
MIKE is likely due to pre-renal azotemia due to dehydration. Baseline creatinine is 1.5-2.0. Most recent creatinine and eGFR are listed below.  Recent Labs     07/07/25  1233 07/07/25  1835   CREATININE 3.0* 2.8*   EGFRNORACEVR 21* 23*      Plan  - MIKE is improving  - Avoid nephrotoxins and renally dose meds for GFR listed above  - Monitor urine output, serial BMP, and adjust therapy as needed

## 2025-07-08 NOTE — ASSESSMENT & PLAN NOTE
MIKE is likely due to pre-renal azotemia due to dehydration. Baseline creatinine is 1.5-2.0. Most recent creatinine and eGFR are listed below.  Recent Labs     07/08/25  1224 07/08/25  1945 07/09/25  0602   CREATININE 2.1* 2.5* 2.0*   EGFRNORACEVR 33* 26* 35*      Plan  - MIKE is improving  - Avoid nephrotoxins and renally dose meds for GFR listed above  - Monitor urine output, serial BMP, and adjust therapy as needed

## 2025-07-08 NOTE — ASSESSMENT & PLAN NOTE
-patient states he is no longer taking any of his home antihypertensives 2/2 low BP readings   - continue to hold

## 2025-07-08 NOTE — HOSPITAL COURSE
Admission to the hospital for MIKE and hypokalemia felt due to volume depletion.  Renal function improved to baseline after IV fluid.  Hypokalemia resolved with replacement.  Phosphorus also low and replaced.  Patient tolerate diet no issues.  Continue to hold hydrochlorothiazide.  Also continue to hold mounjaro until seen by Endocrinology at [facility]. Refer to Nephrology for CKD. Follow up PCP and Endocrinology.

## 2025-07-08 NOTE — CONSULTS
[facility] Emergency Dept  Cardiology  Consult Note    Patient Name: Sherif Knutson Jr.  MRN: [id number]  Admission Date: 7/7/2025  Hospital Length of Stay: 0 days  Code Status: Full Code   Attending Provider: Bryan Lehman MD   Consulting Provider: Fatou Anton MD  Primary Care Physician: Bi Hollins DO  Principal Problem:<principal problem not specified>    Patient information was obtained from patient and ER records.     Inpatient consult to Cardiology  Consult performed by: Fatou Anton MD  Consult ordered by: Cher Varela NP        Subjective:     Chief Complaint:  Troponin leak    HPI:   Sherif Knutson Jr. is a 74 y.o. male, with a PMHx of Syncope, CHF on Plavix and baby aspirin, CAD s/p CABG(2000), HTN on losartan, HLD, kidney stone, who presents to the ED with complaint of symptomatic hypotension that began 1 week ago. Patient reports feeling fatigue with associated lightheadedness. Reports he decided to check his pressure due to having past episodes with similar symptoms where he was dehydrated. Reports this AM his pressure was 114/70 while sitting and 75/51 while standing prompting ED visit. Patient denies any recent strenuous outdoor activities. Reports he remains well hydrated. Reports a recent change in medication where he began to take Mounjaro 2 months ago. Reports he noticed his weight changed where he loss 18lbs, but states he related it to the medications side effects. He reports compliance with his prescribed medications, but denies taking them prior to ED visit. Denies taking his losartan for the past 4 days due to hypotensive concerns. No other exacerbating or alleviating factors. Denies fever, chills, cough, nausea, emesis, diarrhea, or other associated symptoms.     Cardiology is consulted for troponin elevation.    Patient follows up with Dr. Camarillo.    Mr. Knutson is a 74-year-old male with past medical history of CAD status post CABG x5 in 2005, subsequent 3 PCIs,  hypertension and hyperlipidemia who was admitted to the hospital for evaluation of extreme fatigability and lightheadedness.  Symptoms have been going on for last 1 week.  He admits of drinking adequate amount of water.  He checked his blood pressure earlier today which was very low so he came to the ER.  When he came to the ER his blood pressure was okay.  Orthostatic vital signs were positive.  Of note, he had creatinine of 3.0 with baseline creatinine around 1.5-2.0.  He has been initiated on IV fluids.  He had minimally elevated 1st set of troponin.  EKG showed sinus tachycardia without new ST-T wave change.  Most likely troponin elevation is due to demand ischemia in setting of dehydration, acute on chronic kidney disease.  Check echocardiogram and continue to trend troponin.    Past Medical History:   Diagnosis Date    CHF (congestive heart failure) 8/29/2017    Coronary artery disease 2006, 5/2013    s/p pci to rca (2006), LCx (5/2013)    DM (diabetes mellitus)     HLD (hyperlipidemia)     HTN (hypertension)     Kidney stone     EMELI (obstructive sleep apnea) 11/2/2015    Subclavian artery stenosis, left     Syncope and collapse 11/8/2023       Past Surgical History:   Procedure Laterality Date    BACK SURGERY      CHOLECYSTECTOMY  2011    CORONARY ANGIOPLASTY      CORONARY ARTERY BYPASS GRAFT  2000    5 bypass surgeries    SPINE SURGERY  1980    TONSILLECTOMY         Review of patient's allergies indicates:   Allergen Reactions    Sulfa (sulfonamide antibiotics) Photosensitivity    Zocor [simvastatin] Other (See Comments)     lathargic       No current facility-administered medications on file prior to encounter.     Current Outpatient Medications on File Prior to Encounter   Medication Sig    aspirin (ECOTRIN) 81 MG EC tablet Take 81 mg by mouth once daily.    clopidogreL (PLAVIX) 75 mg tablet Take 1 tablet (75 mg total) by mouth every evening.    DULoxetine (CYMBALTA) 30 MG capsule Take 30 mg by mouth once  "daily.    hydroCHLOROthiazide (HYDRODIURIL) 25 MG tablet Take 25 mg by mouth 2 (two) times daily.    insulin glargine,hum.rec.anlog (LANTUS SOLOSTAR U-100 INSULIN SUBQ) Inject 30 Units into the skin once daily.    metoprolol succinate (TOPROL-XL) 25 MG 24 hr tablet Take 1 tablet (25 mg total) by mouth once daily.    pregabalin (LYRICA) 300 MG Cap 2 (two) times a day.    rosuvastatin (CRESTOR) 40 MG Tab Take 40 mg by mouth once daily.    tirzepatide (MOUNJARO) 7.5 mg/0.5 mL PnIj Inject 7.5 mg into the skin every 7 days.    azelastine (ASTELIN) 137 mcg (0.1 %) nasal spray Two sprays each nostril twice daily, sniff full until absorbed, then follow with fluticasone nasal spray    BD ULTRA-FINE JERMAINE PEN NEEDLES 32 gauge x 5/32" Ndle     COZAAR 50 mg tablet Take 50 mg by mouth once daily.    CYANOCOBALAMIN, VITAMIN B-12, (VITAMIN B-12 ORAL) Take by mouth.    EASY TOUCH 31 X 5/16 " Ndle     famotidine (PEPCID) 20 MG tablet Take 20 mg by mouth 2 (two) times daily.    nitroGLYCERIN (NITROSTAT) 0.4 MG SL tablet Place 1 tab under the tongue for chest pain every 5 minutes x 3; if chest pain not relieved after 3 doses go to the ED    oxycodone-acetaminophen (PERCOCET)  mg per tablet Take 1 tablet by mouth every 6 (six) hours as needed for Pain.    SURE COMFORT INSULIN SYRINGE 1/2 mL 31 x 5/16" Syrg     [DISCONTINUED] metFORMIN (GLUCOPHAGE-XR) 750 MG ER 24hr tablet Take 750 mg by mouth 2 (two) times daily.     Family History       Problem Relation (Age of Onset)    Heart attack Mother, Father    Heart disease Mother, Father, Paternal Uncle    Hypertension Brother          Tobacco Use    Smoking status: Never    Smokeless tobacco: Never   Substance and Sexual Activity    Alcohol use: No     Alcohol/week: 0.0 standard drinks of alcohol    Drug use: No    Sexual activity: Yes     Partners: Female     Review of Systems   Cardiovascular:  Negative for chest pain, claudication, dyspnea on exertion, irregular heartbeat, leg " swelling, near-syncope, orthopnea, palpitations, paroxysmal nocturnal dyspnea and syncope.   Respiratory:  Negative for cough, shortness of breath, snoring and sputum production.    Gastrointestinal:  Negative for abdominal pain, dysphagia, heartburn, nausea and vomiting.   Neurological:  Positive for dizziness and weakness. Negative for headaches and loss of balance.     Objective:     Vital Signs (Most Recent):  Temp: 97.7 °F (36.5 °C) (07/07/25 1157)  Pulse: 82 (07/07/25 1800)  Resp: 14 (07/07/25 1348)  BP: (!) 113/59 (07/07/25 1700)  SpO2: (!) 91 % (07/07/25 1800) Vital Signs (24h Range):  Temp:  [97.7 °F (36.5 °C)] 97.7 °F (36.5 °C)  Pulse:  [] 82  Resp:  [14-17] 14  SpO2:  [91 %-95 %] 91 %  BP: ()/(55-71) 113/59     Weight: 94.8 kg (209 lb)  Body mass index is 28.35 kg/m².    SpO2: (!) 91 %         Intake/Output Summary (Last 24 hours) at 7/7/2025 1900  Last data filed at 7/7/2025 1654  Gross per 24 hour   Intake 1000 ml   Output --   Net 1000 ml       Lines/Drains/Airways       Peripheral Intravenous Line  Duration             Peripheral IV Single Lumen 07/07/25 1234 20 G Anterior;Right Forearm <1 day                     Physical Exam  HENT:      Head: Normocephalic and atraumatic.      Mouth/Throat:      Mouth: Mucous membranes are moist.   Eyes:      Extraocular Movements: Extraocular movements intact.      Pupils: Pupils are equal, round, and reactive to light.   Cardiovascular:      Rate and Rhythm: Normal rate and regular rhythm.      Pulses: Normal pulses.      Heart sounds: Normal heart sounds.   Pulmonary:      Effort: Pulmonary effort is normal.      Breath sounds: Normal breath sounds.   Abdominal:      General: Bowel sounds are normal.      Palpations: Abdomen is soft.   Musculoskeletal:      Left lower leg: No edema.   Skin:     General: Skin is warm.   Neurological:      General: No focal deficit present.      Mental Status: He is alert.   Psychiatric:         Mood and Affect: Mood  normal.         Behavior: Behavior normal.          Current Medications:   [START ON 7/8/2025] aspirin  81 mg Oral Daily    [START ON 7/8/2025] clopidogreL  75 mg Oral QHS    [START ON 7/8/2025] famotidine  20 mg Oral Q48H    pregabalin  300 mg Oral BID      0.9% NaCl   Intravenous Continuous 75 mL/hr at 07/07/25 1703 New Bag at 07/07/25 1703       Current Facility-Administered Medications:     acetaminophen, 650 mg, Oral, Q4H PRN    albuterol-ipratropium, 3 mL, Nebulization, Q4H PRN    melatonin, 6 mg, Oral, Nightly PRN    naloxone, 0.02 mg, Intravenous, PRN    ondansetron, 4 mg, Intravenous, Q8H PRN    senna-docusate, 1 tablet, Oral, BID PRN    simethicone, 1 tablet, Oral, QID PRN    sodium chloride 0.9%, 10 mL, Intravenous, Q8H PRN    Laboratory (all labs reviewed):  CBC:  Recent Labs   Lab 11/08/23 0138 07/07/25  1233   WBC 7.23 12.32   Hemoglobin 12.8 L  --    HGB  --  17.7   Hematocrit 41.8  --    HCT  --  52.2   Platelet Count  --  158   Platelets 159  --        CHEMISTRIES:  Recent Labs   Lab 11/07/23  2223 11/08/23 0138 11/08/23  0138 11/08/23  1702 10/22/24  1955 01/29/25  2032 01/31/25  0856 02/24/25  1126 04/09/25  1133 05/21/25  1222 07/07/25  1233   Glucose  --  129 H  --  247 H  --   --   --   --  243 H  --  232 H   Sodium  --  140   < > 140   < > 136 137 132 L 137 139 135 L   Potassium  --  4.5   < > 4.0   < > 4.1 3.9 5.2 3.6  --  2.8 L   BUN  --  29 H   < > 27 H   < > 65.1 H 40.7 H 41.2 H  --  27.1 H 56 H   Blood Urea Nitrogen  --   --   --   --   --   --   --   --  45 H  --   --    Creatinine  --  2.5 H   < > 2.1 H   < > 2.43 H 1.58 H 1.83 H 2.02 H 1.86 H 3.0 H   eGFR  --  27 A   < > 33 A   < > 27 L 46 L 38 L 34 L 38 L 21 L   Calcium  --  8.6 L   < > 8.9   < > 11.1 H 10.1 9.3 9.9 9.0 11.0 H   Magnesium   --   --   --   --   --   --   --   --   --   --  2.3   Magnesium 2.2 2.1  --   --   --   --   --   --   --   --   --     < > = values in this interval not displayed.       CARDIAC  BIOMARKERS:  Recent Labs   Lab 07/07/25  1233    H   Troponin-I 0.035 H       COAGS:  Recent Labs   Lab 09/14/22  1119 11/07/23  2223 11/15/24  1048 02/24/25  1126   INR 1.1 1.7 H 1.1 1.1       LIPIDS/LFTS:  Recent Labs   Lab 11/08/23  0138 10/22/24  1955 01/29/25  2032 02/24/25  1126 05/21/25  1222 07/07/25  1233   Cholesterol  --   --   --   --  106  --    Triglycerides  --   --   --   --  142  --    HDL  --   --   --   --  50  --    LDL Calculated  --   --   --   --  28  --    Non-HDL Cholesterol  --   --   --   --  56  --    AST 20 12 27 20  --  35   ALT 22 16 38 35 18 45 H       BNP:  Recent Labs   Lab 07/07/25  1233   BNP 27       TSH:        Free T4:        Diagnostic Results:  ECG (personally reviewed and interpreted tracing(s)):  7 July 2025  Sinus tachycardia, right axis deviation with poor R-wave progression    Chest X-Ray (personally reviewed and interpreted image(s)):  No acute cardiopulmonary process    Echo: 11/2023    Left Ventricle: Normal wall motion. There is normal systolic function with a visually estimated ejection fraction of 65 - 70%. Grade I diastolic dysfunction.    Right Ventricle: Normal right ventricular cavity size. Systolic function is normal.    Left Atrium: Left atrium is moderately dilated.    Right Atrium: Right atrium is mildly dilated.    Tricuspid Valve: There is mild regurgitation.    Pulmonary Artery: The estimated pulmonary artery systolic pressure is 14 mmHg.    IVC/SVC: Normal venous pressure at 3 mmHg.    Bubble study positive for right-to-left shunt    Stress Test:  Stress test in January, 2025  Myocardial perfusion imaging is mildly abnormal.    There is a small area of infarction in the Anterolateral region.    Mildly reduced left ventricular systolic function.    Abnormal stress ECG.    Ischemia- Negative with baseine abnormalities   Arrythmia- Negative     Cath:  Cardiac catheterization in April, 2018  Left main coronary artery with mild luminal  irregularity  Proximal LAD had 80% stenosis followed by chronic total occlusion in midportion  First diagonal artery had 75% stenosis  2nd diagonal artery had chronic total occlusion  Proximal left circumflex artery had 60% stenosis  OM1 and OM2 arteries had minimal luminal irregularities  OM 4had 60% stenosis  RCA had mild luminal irregularities    Weems to LAD was patent and known occlusions of SVG to D1, SVG to OM, SVG to RCA    Subsequent PCIs to RCA in 2006, left circumflex artery in 2013 and OM 4 in 2017  Assessment and Plan:     Atherosclerosis of autologous vein coronary artery bypass graft with stable angina pectoris  CAD status post CABG in 2005  Subsequent 3 PCIs afterwards    No chest pain or shortness of breath at presentation to the ED    Troponin leak most likely due to demand ischemia in setting of dehydration and acute on chronic kidney disease    Continue to trend troponin until peak is reached  Check echocardiogram      Orthostatic hypotension  Check orthostatic vital signs Q shift  Continue with IV fluid resuscitation    Dyslipidemia  Continue with high-intensity statin        VTE Risk Mitigation (From admission, onward)           Ordered     IP VTE HIGH RISK PATIENT  Once         07/07/25 1531     Place sequential compression device  Until discontinued         07/07/25 1531                    Fatou Anton MD  Cardiology   [facility] [place] - Emergency Dept

## 2025-07-08 NOTE — ASSESSMENT & PLAN NOTE
CAD status post CABG in 2005  Subsequent 3 PCIs afterwards    No chest pain or shortness of breath at presentation to the ED    Troponin leak most likely due to demand ischemia in setting of dehydration and acute on chronic kidney disease    Troponin now trending back to normal  Echocardiogram pending

## 2025-07-08 NOTE — PROGRESS NOTES
[facility] Emergency Dept  Cardiology  Progress Note    Patient Name: Sherif Knutson Jr.  MRN: [id number]  Admission Date: 7/7/2025  Hospital Length of Stay: 0 days  Code Status: Full Code   Attending Physician: August Vail DO   Primary Care Physician: Bi Hollins DO  Expected Discharge Date: 7/8/2025  Principal Problem:MIKE (acute kidney injury)    Subjective:     Hospital Course:   Patient was seen and examined this morning.  No more lightheadedness or dizziness  Creatinine is trending down towards baseline  Troponin has come to normal level        Review of Systems   Cardiovascular:  Negative for chest pain, claudication, dyspnea on exertion, irregular heartbeat, leg swelling, near-syncope, orthopnea, palpitations, paroxysmal nocturnal dyspnea and syncope.   Respiratory:  Negative for cough, shortness of breath, snoring and sputum production.    Gastrointestinal:  Negative for abdominal pain, dysphagia, heartburn, nausea and vomiting.   Neurological:  Negative for dizziness, headaches, loss of balance and weakness.     Objective:     Vital Signs (Most Recent):  Temp: 98.1 °F (36.7 °C) (07/08/25 0831)  Pulse: 87 (07/08/25 0800)  Resp: 18 (07/08/25 0045)  BP: (!) 111/59 (07/08/25 0800)  SpO2: (!) 94 % (07/08/25 0800) Vital Signs (24h Range):  Temp:  [97.4 °F (36.3 °C)-98.1 °F (36.7 °C)] 98.1 °F (36.7 °C)  Pulse:  [] 87  Resp:  [14-39] 18  SpO2:  [89 %-95 %] 94 %  BP: ()/(46-71) 111/59     Weight: 94.8 kg (209 lb)  Body mass index is 28.35 kg/m².     SpO2: (!) 94 %         Intake/Output Summary (Last 24 hours) at 7/8/2025 0840  Last data filed at 7/8/2025 0416  Gross per 24 hour   Intake 1240 ml   Output 1400 ml   Net -160 ml       Lines/Drains/Airways       Peripheral Intravenous Line  Duration             Peripheral IV Single Lumen 07/07/25 1234 20 G Anterior;Right Forearm <1 day                       Physical Exam  HENT:      Head: Normocephalic and atraumatic.      Mouth/Throat:      Mouth:  Mucous membranes are moist.   Eyes:      Extraocular Movements: Extraocular movements intact.      Pupils: Pupils are equal, round, and reactive to light.   Cardiovascular:      Rate and Rhythm: Normal rate and regular rhythm.      Pulses: Normal pulses.      Heart sounds: Normal heart sounds.   Pulmonary:      Effort: Pulmonary effort is normal.      Breath sounds: Normal breath sounds.   Abdominal:      General: Bowel sounds are normal.      Palpations: Abdomen is soft.   Musculoskeletal:      Left lower leg: No edema.   Skin:     General: Skin is warm.   Neurological:      General: No focal deficit present.      Mental Status: He is alert.   Psychiatric:         Mood and Affect: Mood normal.         Behavior: Behavior normal.          Current Medications:   aspirin  81 mg Oral Daily    clopidogreL  75 mg Oral QHS    famotidine  20 mg Oral Q48H    potassium chloride  40 mEq Oral Q2H    pregabalin  300 mg Oral BID      0.9% NaCl   Intravenous Continuous 75 mL/hr at 07/07/25 1703 New Bag at 07/07/25 1703       Current Facility-Administered Medications:     acetaminophen, 650 mg, Oral, Q4H PRN    albuterol-ipratropium, 3 mL, Nebulization, Q4H PRN    melatonin, 6 mg, Oral, Nightly PRN    naloxone, 0.02 mg, Intravenous, PRN    ondansetron, 4 mg, Intravenous, Q8H PRN    senna-docusate, 1 tablet, Oral, BID PRN    simethicone, 1 tablet, Oral, QID PRN    sodium chloride 0.9%, 10 mL, Intravenous, Q8H PRN    Laboratory (all labs reviewed):  CBC:  Recent Labs   Lab 11/08/23  0138 07/07/25  1233 07/08/25  0706   WBC 7.23 12.32 8.90   Hemoglobin 12.8 L  --   --    HGB  --  17.7 15.6   Hematocrit 41.8  --   --    HCT  --  52.2 48.3   Platelet Count  --  158 140 L   Platelets 159  --   --        CHEMISTRIES:  Recent Labs   Lab 11/07/23  2223 11/08/23  0138 11/08/23  0138 11/08/23  1702 10/22/24  1955 02/24/25  1126 04/09/25  1133 05/21/25  1222 07/07/25  1233 07/07/25  1835 07/08/25  0706   Glucose  --  129 H   < > 247 H  --   --   243 H  --  232 H 209 H 117 H   Sodium  --  140   < > 140   < > 132 L 137 139 135 L 139 141   Potassium  --  4.5   < > 4.0   < > 5.2 3.6  --  2.8 L 3.1 L 2.5 LL   BUN  --  29 H   < > 27 H   < > 41.2 H  --  27.1 H 56 H 53 H 45 H   Blood Urea Nitrogen  --   --   --   --   --   --  45 H  --   --   --   --    Creatinine  --  2.5 H   < > 2.1 H   < > 1.83 H 2.02 H 1.86 H 3.0 H 2.8 H 2.2 H   eGFR  --  27 A   < > 33 A   < > 38 L 34 L 38 L 21 L 23 L 31 L   Calcium  --  8.6 L   < > 8.9   < > 9.3 9.9 9.0 11.0 H 9.6 9.1   Magnesium   --   --   --   --   --   --   --   --  2.3 2.3 2.2   Magnesium 2.2 2.1  --   --   --   --   --   --   --   --   --     < > = values in this interval not displayed.       CARDIAC BIOMARKERS:  Recent Labs   Lab 07/07/25  1233 07/07/25  1835 07/08/25  0000 07/08/25  0458    H  --   --   --    Troponin-I 0.035 H 0.031 H 0.026 0.023       COAGS:  Recent Labs   Lab 09/14/22  1119 11/07/23  2223 11/15/24  1048 02/24/25  1126   INR 1.1 1.7 H 1.1 1.1       LIPIDS/LFTS:  Recent Labs   Lab 01/29/25  2032 02/24/25  1126 05/21/25  1222 07/07/25  1233 07/07/25  1835 07/08/25  0706   Cholesterol Total  --   --   --   --  85 L  --    Cholesterol  --   --  106  --   --   --    Triglycerides  --   --  142  --   --   --    Triglyceride  --   --   --   --  153 H  --    HDL  --   --  50  --   --   --    HDL Cholesterol  --   --   --   --  33 L  --    LDL Cholesterol  --   --   --   --  21.4 L  --    LDL Calculated  --   --  28  --   --   --    Non-HDL Cholesterol  --   --  56  --   --   --    Non HDL Cholesterol  --   --   --   --  52  --    AST 27 20  --  35 29 24   ALT 38 35 18 45 H 37 33       BNP:  Recent Labs   Lab 07/07/25  1233   BNP 27       TSH:        Free T4:          Assessment and Plan:       Atherosclerosis of autologous vein coronary artery bypass graft with stable angina pectoris  CAD status post CABG in 2005  Subsequent 3 PCIs afterwards    No chest pain or shortness of breath at presentation to  the ED    Troponin leak most likely due to demand ischemia in setting of dehydration and acute on chronic kidney disease    Troponin now trending back to normal  Echocardiogram pending        Orthostatic hypotension  Check orthostatic vital signs Q shift  Continue with IV fluid resuscitation    Dyslipidemia  Start high-intensity        VTE Risk Mitigation (From admission, onward)           Ordered     IP VTE HIGH RISK PATIENT  Once         07/07/25 1531     Place sequential compression device  Until discontinued         07/07/25 1531                  Cardiology will sign off unless there are surprises on echocardiogram.    Follow with Dr. Camarillo as scheduled    Fatou Anton MD  Cardiology  [facility] [place]

## 2025-07-08 NOTE — HPI
Sherif Knutson Jr. is a 74 y.o. male, with a PMHx of Syncope, CHF on Plavix and baby aspirin, CAD s/p CABG(2000), HTN on losartan, HLD, kidney stone, who presents to the ED with complaint of symptomatic hypotension that began 1 week ago. Patient reports feeling fatigue with associated lightheadedness. Reports he decided to check his pressure due to having past episodes with similar symptoms where he was dehydrated. Reports this AM his pressure was 114/70 while sitting and 75/51 while standing prompting ED visit. Patient denies any recent strenuous outdoor activities. Reports he remains well hydrated. Reports a recent change in medication where he began to take Mounjaro 2 months ago. Reports he noticed his weight changed where he loss 18lbs, but states he related it to the medications side effects. He reports compliance with his prescribed medications, but denies taking them prior to ED visit. Denies taking his losartan for the past 4 days due to hypotensive concerns. No other exacerbating or alleviating factors. Denies fever, chills, cough, nausea, emesis, diarrhea, or other associated symptoms.     Cardiology is consulted for troponin elevation.    Patient follows up with Dr. Camarillo.    Mr. Knutson is a 74-year-old male with past medical history of CAD status post CABG x5 in 2005, subsequent 3 PCIs, hypertension and hyperlipidemia who was admitted to the hospital for evaluation of extreme fatigability and lightheadedness.  Symptoms have been going on for last 1 week.  He admits of drinking adequate amount of water.  He checked his blood pressure earlier today which was very low so he came to the ER.  When he came to the ER his blood pressure was okay.  Orthostatic vital signs were positive.  Of note, he had creatinine of 3.0 with baseline creatinine around 1.5-2.0.  He has been initiated on IV fluids.  He had minimally elevated 1st set of troponin.  EKG showed sinus tachycardia without new ST-T wave change.  Most likely  troponin elevation is due to demand ischemia in setting of dehydration, acute on chronic kidney disease.  Check echocardiogram and continue to trend troponin.

## 2025-07-08 NOTE — ASSESSMENT & PLAN NOTE
CAD status post CABG in 2005  Subsequent 3 PCIs afterwards    No chest pain or shortness of breath at presentation to the ED    Troponin leak most likely due to demand ischemia in setting of dehydration and acute on chronic kidney disease    Continue to trend troponin until peak is reached  Check echocardiogram

## 2025-07-08 NOTE — SUBJECTIVE & OBJECTIVE
"Past Medical History:   Diagnosis Date    CHF (congestive heart failure) 8/29/2017    Coronary artery disease 2006, 5/2013    s/p pci to rca (2006), LCx (5/2013)    DM (diabetes mellitus)     HLD (hyperlipidemia)     HTN (hypertension)     Kidney stone     EMELI (obstructive sleep apnea) 11/2/2015    Subclavian artery stenosis, left     Syncope and collapse 11/8/2023       Past Surgical History:   Procedure Laterality Date    BACK SURGERY      CHOLECYSTECTOMY  2011    CORONARY ANGIOPLASTY      CORONARY ARTERY BYPASS GRAFT  2000    5 bypass surgeries    SPINE SURGERY  1980    TONSILLECTOMY         Review of patient's allergies indicates:   Allergen Reactions    Sulfa (sulfonamide antibiotics) Photosensitivity    Zocor [simvastatin] Other (See Comments)     lathargic       No current facility-administered medications on file prior to encounter.     Current Outpatient Medications on File Prior to Encounter   Medication Sig    aspirin (ECOTRIN) 81 MG EC tablet Take 81 mg by mouth once daily.    clopidogreL (PLAVIX) 75 mg tablet Take 1 tablet (75 mg total) by mouth every evening.    DULoxetine (CYMBALTA) 30 MG capsule Take 30 mg by mouth once daily.    hydroCHLOROthiazide (HYDRODIURIL) 25 MG tablet Take 25 mg by mouth 2 (two) times daily.    insulin glargine,hum.rec.anlog (LANTUS SOLOSTAR U-100 INSULIN SUBQ) Inject 30 Units into the skin once daily.    metoprolol succinate (TOPROL-XL) 25 MG 24 hr tablet Take 1 tablet (25 mg total) by mouth once daily.    pregabalin (LYRICA) 300 MG Cap 2 (two) times a day.    rosuvastatin (CRESTOR) 40 MG Tab Take 40 mg by mouth once daily.    tirzepatide (MOUNJARO) 7.5 mg/0.5 mL PnIj Inject 7.5 mg into the skin every 7 days.    azelastine (ASTELIN) 137 mcg (0.1 %) nasal spray Two sprays each nostril twice daily, sniff full until absorbed, then follow with fluticasone nasal spray    BD ULTRA-FINE JERMAINE PEN NEEDLES 32 gauge x 5/32" Ndle     COZAAR 50 mg tablet Take 50 mg by mouth once daily. " "   CYANOCOBALAMIN, VITAMIN B-12, (VITAMIN B-12 ORAL) Take by mouth.    EASY TOUCH 31 X 5/16 " Ndle     famotidine (PEPCID) 20 MG tablet Take 20 mg by mouth 2 (two) times daily.    nitroGLYCERIN (NITROSTAT) 0.4 MG SL tablet Place 1 tab under the tongue for chest pain every 5 minutes x 3; if chest pain not relieved after 3 doses go to the ED    oxycodone-acetaminophen (PERCOCET)  mg per tablet Take 1 tablet by mouth every 6 (six) hours as needed for Pain.    SURE COMFORT INSULIN SYRINGE 1/2 mL 31 x 5/16" Syrg      Family History       Problem Relation (Age of Onset)    Heart attack Mother, Father    Heart disease Mother, Father, Paternal Uncle    Hypertension Brother          Tobacco Use    Smoking status: Never    Smokeless tobacco: Never   Substance and Sexual Activity    Alcohol use: No     Alcohol/week: 0.0 standard drinks of alcohol    Drug use: No    Sexual activity: Yes     Partners: Female     Review of Systems   Constitutional:  Positive for activity change and fatigue. Negative for chills and fever.   Eyes:  Negative for photophobia.   Respiratory:  Negative for cough, chest tightness, shortness of breath and wheezing.    Cardiovascular:  Negative for chest pain, palpitations and leg swelling.   Gastrointestinal:  Negative for abdominal pain, diarrhea, nausea and vomiting.   Genitourinary:  Negative for dysuria, flank pain and hematuria.   Musculoskeletal:  Negative for back pain, myalgias and neck pain.   Skin:  Negative for rash and wound.   Neurological:  Positive for weakness and light-headedness. Negative for dizziness, syncope and headaches.   Psychiatric/Behavioral:  Negative for agitation.      Objective:     Vital Signs (Most Recent):  Temp: 97.4 °F (36.3 °C) (07/07/25 1923)  Pulse: 90 (07/08/25 0000)  Resp: (!) 39 (07/07/25 2152)  BP: (!) 99/50 (07/08/25 0000)  SpO2: (!) 90 % (07/08/25 0000) Vital Signs (24h Range):  Temp:  [97.4 °F (36.3 °C)-97.7 °F (36.5 °C)] 97.4 °F (36.3 °C)  Pulse:  " [] 90  Resp:  [14-39] 39  SpO2:  [89 %-95 %] 90 %  BP: ()/(46-71) 99/50     Weight: 94.8 kg (209 lb)  Body mass index is 28.35 kg/m².     Physical Exam  Constitutional:       General: He is not in acute distress.     Appearance: He is well-developed.   HENT:      Head: Normocephalic and atraumatic.   Eyes:      Conjunctiva/sclera: Conjunctivae normal.      Pupils: Pupils are equal, round, and reactive to light.   Neck:      Vascular: No JVD.   Cardiovascular:      Rate and Rhythm: Normal rate and regular rhythm.      Heart sounds: Normal heart sounds.   Pulmonary:      Effort: Pulmonary effort is normal. No respiratory distress.      Breath sounds: Normal breath sounds. No wheezing.   Abdominal:      General: Bowel sounds are normal. There is no distension.      Palpations: Abdomen is soft.      Tenderness: There is no abdominal tenderness. There is no guarding.   Musculoskeletal:         General: No tenderness. Normal range of motion.      Cervical back: Normal range of motion and neck supple.   Skin:     General: Skin is warm and dry.      Capillary Refill: Capillary refill takes less than 2 seconds.      Findings: No erythema.   Neurological:      Mental Status: He is alert and oriented to person, place, and time.      Comments: Lethargic    Psychiatric:         Behavior: Behavior normal.              CRANIAL NERVES     CN III, IV, VI   Pupils are equal, round, and reactive to light.       Significant Labs: All pertinent labs within the past 24 hours have been reviewed.  CBC:   Recent Labs   Lab 07/07/25  1233   WBC 12.32   HGB 17.7   HCT 52.2        CMP:   Recent Labs   Lab 07/07/25  1233 07/07/25  1835   * 139   K 2.8* 3.1*   CL 90* 93*   CO2 29 30*   * 209*   BUN 56* 53*   CREATININE 3.0* 2.8*   CALCIUM 11.0* 9.6   PROT 8.5* 7.2   ALBUMIN 4.2 3.5   BILITOT 1.6* 1.2*   ALKPHOS 111 93   AST 35 29   ALT 45* 37   ANIONGAP 16 16     Cardiac Markers:   Recent Labs   Lab 07/07/25  1233    BNP 27     Troponin:   Recent Labs   Lab 07/07/25  1233 07/07/25  1835   TROPONINI 0.035* 0.031*     Urine Studies:   Recent Labs   Lab 07/07/25  1429   COLORU Yellow   APPEARANCEUA Clear   PHUR 6.0   SPECGRAV 1.020   PROTEINUA 2+*   GLUCUA Trace*   BILIRUBINUA Negative   OCCULTUA 2+*   NITRITE Negative   UROBILINOGEN Negative   LEUKOCYTESUR Negative   RBCUA 0   WBCUA 1   BACTERIA Rare   HYALINECASTS 29*       Significant Imaging: I have reviewed all pertinent imaging results/findings within the past 24 hours.

## 2025-07-08 NOTE — PLAN OF CARE
Case Management Final Discharge Note    Discharge Disposition: home     New DME ordered / company name: none    Relevant SDOH / Transition of Care Barriers:  none    Person available to provide assistance at home when needed and their contact information: Sherif Knutson (Son)  [phone] (Mobile)     Scheduled followup appointment: PCP on 7/14/25 @10:00am    Referrals placed: none    Transportation: family    Patient and family educated on discharge services and updated on DC plan. Bedside RN notified, patient clear to discharge from Case Management Perspective.      07/08/25 1247   Final Note   Assessment Type Final Discharge Note   Anticipated Discharge Disposition Home   What phone number can be called within the next 1-3 days to see how you are doing after discharge? [phone]   Hospital Resources/Appts/Education Provided Appointments scheduled and added to AVS   Post-Acute Status   Discharge Delays None known at this time

## 2025-07-09 ENCOUNTER — DOCUMENTATION ONLY (OUTPATIENT)
Facility: CLINIC | Age: 75
End: 2025-07-09
Payer: MEDICARE

## 2025-07-09 VITALS
SYSTOLIC BLOOD PRESSURE: 130 MMHG | RESPIRATION RATE: 18 BRPM | OXYGEN SATURATION: 92 % | TEMPERATURE: 98 F | BODY MASS INDEX: 26.67 KG/M2 | WEIGHT: 196.88 LBS | HEIGHT: 72 IN | HEART RATE: 86 BPM | DIASTOLIC BLOOD PRESSURE: 79 MMHG

## 2025-07-09 LAB
ABSOLUTE EOSINOPHIL (OHS): 0.15 K/UL
ABSOLUTE MONOCYTE (OHS): 0.7 K/UL (ref 0.3–1)
ABSOLUTE NEUTROPHIL COUNT (OHS): 4.76 K/UL (ref 1.8–7.7)
ALBUMIN SERPL BCP-MCNC: 3.3 G/DL (ref 3.5–5.2)
ALP SERPL-CCNC: 108 UNIT/L (ref 40–150)
ALT SERPL W/O P-5'-P-CCNC: 29 UNIT/L (ref 10–44)
ANION GAP (OHS): 12 MMOL/L (ref 8–16)
AST SERPL-CCNC: 20 UNIT/L (ref 11–45)
BASOPHILS # BLD AUTO: 0.04 K/UL
BASOPHILS NFR BLD AUTO: 0.6 %
BILIRUB SERPL-MCNC: 0.8 MG/DL (ref 0.1–1)
BUN SERPL-MCNC: 32 MG/DL (ref 8–23)
CALCIUM SERPL-MCNC: 8.8 MG/DL (ref 8.7–10.5)
CHLORIDE SERPL-SCNC: 102 MMOL/L (ref 95–110)
CO2 SERPL-SCNC: 27 MMOL/L (ref 23–29)
CREAT SERPL-MCNC: 2 MG/DL (ref 0.5–1.4)
ERYTHROCYTE [DISTWIDTH] IN BLOOD BY AUTOMATED COUNT: 13.3 % (ref 11.5–14.5)
GFR SERPLBLD CREATININE-BSD FMLA CKD-EPI: 35 ML/MIN/1.73/M2
GLUCOSE SERPL-MCNC: 275 MG/DL (ref 70–110)
HCT VFR BLD AUTO: 46.4 % (ref 40–54)
HGB BLD-MCNC: 15.5 GM/DL (ref 14–18)
IMM GRANULOCYTES # BLD AUTO: 0.02 K/UL (ref 0–0.04)
IMM GRANULOCYTES NFR BLD AUTO: 0.3 % (ref 0–0.5)
LYMPHOCYTES # BLD AUTO: 1.37 K/UL (ref 1–4.8)
MAGNESIUM SERPL-MCNC: 2 MG/DL (ref 1.6–2.6)
MCH RBC QN AUTO: 29.5 PG (ref 27–31)
MCHC RBC AUTO-ENTMCNC: 33.4 G/DL (ref 32–36)
MCV RBC AUTO: 88 FL (ref 82–98)
NUCLEATED RBC (/100WBC) (OHS): 0 /100 WBC
OHS QRS DURATION: 130 MS
OHS QTC CALCULATION: 482 MS
PHOSPHATE SERPL-MCNC: 1.9 MG/DL (ref 2.7–4.5)
PLATELET # BLD AUTO: 122 K/UL (ref 150–450)
PMV BLD AUTO: 11 FL (ref 9.2–12.9)
POTASSIUM SERPL-SCNC: 3.5 MMOL/L (ref 3.5–5.1)
PROT SERPL-MCNC: 6.9 GM/DL (ref 6–8.4)
RBC # BLD AUTO: 5.25 M/UL (ref 4.6–6.2)
RELATIVE EOSINOPHIL (OHS): 2.1 %
RELATIVE LYMPHOCYTE (OHS): 19.5 % (ref 18–48)
RELATIVE MONOCYTE (OHS): 9.9 % (ref 4–15)
RELATIVE NEUTROPHIL (OHS): 67.6 % (ref 38–73)
SODIUM SERPL-SCNC: 141 MMOL/L (ref 136–145)
WBC # BLD AUTO: 7.04 K/UL (ref 3.9–12.7)

## 2025-07-09 PROCEDURE — 99900035 HC TECH TIME PER 15 MIN (STAT)

## 2025-07-09 PROCEDURE — 85025 COMPLETE CBC W/AUTO DIFF WBC: CPT | Performed by: NURSE PRACTITIONER

## 2025-07-09 PROCEDURE — 25000003 PHARM REV CODE 250: Performed by: NURSE PRACTITIONER

## 2025-07-09 PROCEDURE — 36415 COLL VENOUS BLD VENIPUNCTURE: CPT | Performed by: NURSE PRACTITIONER

## 2025-07-09 PROCEDURE — 80053 COMPREHEN METABOLIC PANEL: CPT | Performed by: NURSE PRACTITIONER

## 2025-07-09 PROCEDURE — 94761 N-INVAS EAR/PLS OXIMETRY MLT: CPT

## 2025-07-09 PROCEDURE — 84100 ASSAY OF PHOSPHORUS: CPT | Performed by: NURSE PRACTITIONER

## 2025-07-09 PROCEDURE — 83735 ASSAY OF MAGNESIUM: CPT | Performed by: NURSE PRACTITIONER

## 2025-07-09 RX ORDER — FAMOTIDINE 20 MG/1
20 TABLET, FILM COATED ORAL DAILY
Status: DISCONTINUED | OUTPATIENT
Start: 2025-07-09 | End: 2025-07-09 | Stop reason: HOSPADM

## 2025-07-09 RX ORDER — SODIUM,POTASSIUM PHOSPHATES 280-250MG
2 POWDER IN PACKET (EA) ORAL ONCE
Status: COMPLETED | OUTPATIENT
Start: 2025-07-09 | End: 2025-07-09

## 2025-07-09 RX ADMIN — SODIUM CHLORIDE: 9 INJECTION, SOLUTION INTRAVENOUS at 01:07

## 2025-07-09 RX ADMIN — ASPIRIN 81 MG: 81 TABLET ORAL at 09:07

## 2025-07-09 RX ADMIN — PREGABALIN 300 MG: 50 CAPSULE ORAL at 09:07

## 2025-07-09 RX ADMIN — Medication 2 PACKET: at 09:07

## 2025-07-09 NOTE — ASSESSMENT & PLAN NOTE
-likely 2/2 demand in setting of hypotension and MIKE  - patient denies chest pain on exam   - trend troponin  - check TTE  -consult cardiology 2/2 cardiac history - demand ischemia from hypotension and MIKE

## 2025-07-09 NOTE — PROGRESS NOTES
Pharmacist Renal Dose Adjustment Note    Sherif Knutson Jr. is a 74 y.o. male being treated with the medication famotidine    Patient Data:    Vital Signs (Most Recent):  Temp: 97.9 °F (36.6 °C) (07/09/25 0728)  Pulse: 91 (07/09/25 0823)  Resp: 18 (07/09/25 0728)  BP: 130/79 (07/09/25 0728)  SpO2: (!) 92 % (07/09/25 0823) Vital Signs (72h Range):  Temp:  [97.4 °F (36.3 °C)-98.7 °F (37.1 °C)]   Pulse:  []   Resp:  [14-39]   BP: ()/(46-80)   SpO2:  [89 %-97 %]      Recent Labs   Lab 07/08/25  1224 07/08/25  1945 07/09/25  0602   CREATININE 2.1* 2.5* 2.0*     Serum creatinine: 2 mg/dL (H) 07/09/25 0602  Estimated creatinine clearance: 36.1 mL/min (A)    Medication:famotidine 20mg po q48hr frequency will be changed to medication:famotidine 20mg po q24hr dose frequency    Pharmacist's Name: Renay Jacobson  Pharmacist's Extension: [phone]

## 2025-07-09 NOTE — NURSING
VIRTUAL NURSE: Pt arrived to unit. Permission received per patient to turn camera to view patient. VIP model explained; patient informed this VN will be working with bedside nurse and the rest of the care team. Plan of care reviewed with patient.  Educated patient on VTE, fall risk and fall risk precautions in place. Call light within reach, side rails up x2. Admission questions completed. Patient instucted to ask staff for assistance. Initiated plan of care.

## 2025-07-09 NOTE — PLAN OF CARE
Case Management Final Discharge Note    Discharge Disposition: Home    New DME ordered / company name: None    Relevant SDOH / Transition of Care Barriers:  None    Person available to provide assistance at home when needed and their contact information: Sherif Knutson (Son)  [phone]       Scheduled followup appointment: Follow up appointments with PCP and Endocrinology scheduled and added to patient AVS; in-basket message sent to [facility]iology [facility] to schedule hospital follow up     Referrals placed: Referral placed to Nephrology    Transportation: Private vehicle/family taking patient home        Patient and family educated on discharge services and updated on DC plan.  Patient to discharge home; he lives with his son who is able to assist him at home as needed.  Bedside RN notified, patient clear to discharge from Case Management Perspective.    07/09/25 0901   Final Note   Assessment Type Final Discharge Note   Anticipated Discharge Disposition Home   Hospital Resources/Appts/Education Provided Provided patient/caregiver with written discharge plan information;Appointments scheduled and added to AVS   Post-Acute Status   Post-Acute Authorization Other   Other Status No Post-Acute Service Needs   Discharge Delays None known at this time

## 2025-07-09 NOTE — PLAN OF CARE
Problem: Adult Inpatient Plan of Care  Goal: Plan of Care Review  Outcome: Adequate for Care Transition  Goal: Patient-Specific Goal (Individualized)  Outcome: Adequate for Care Transition  Goal: Absence of Hospital-Acquired Illness or Injury  Outcome: Adequate for Care Transition  Goal: Optimal Comfort and Wellbeing  Outcome: Adequate for Care Transition  Goal: Readiness for Transition of Care  Outcome: Adequate for Care Transition     Problem: Diabetes Comorbidity  Goal: Blood Glucose Level Within Targeted Range  Outcome: Adequate for Care Transition     Problem: Acute Kidney Injury/Impairment  Goal: Fluid and Electrolyte Balance  Outcome: Adequate for Care Transition  Goal: Improved Oral Intake  Outcome: Adequate for Care Transition  Goal: Effective Renal Function  Outcome: Adequate for Care Transition     Problem: Fall Injury Risk  Goal: Absence of Fall and Fall-Related Injury  Outcome: Adequate for Care Transition     Problem: Fatigue  Goal: Improved Activity Tolerance  Outcome: Adequate for Care Transition     Problem: Comorbidity Management  Goal: Maintenance of Heart Failure Symptom Control  Outcome: Adequate for Care Transition  Goal: Blood Pressure in Desired Range  Outcome: Adequate for Care Transition

## 2025-07-09 NOTE — PROGRESS NOTES
[facility] [place] [facility] Dept  [facility] Medicine  Progress Note    Patient Name: Sherif Knutson Jr.  MRN: [id number]  Patient Class: OP- Observation   Admission Date: 7/7/2025  Length of Stay: 0 days  Attending Physician: August Vail DO  Primary Care Provider: Bi Hollins DO        Subjective     Principal Problem:MIKE (acute kidney injury)        HPI:  Mr. Knutson is a 74-year-old male with past medical history of HTN, HLD, CAD s/p 5 vessel CABG, PCI x 3, systolic HF and DM who presented to [facility] ED 7/7/25 for evaluation of generalized weakness, fatigue and lightheadedness. Symptoms have been going on for 1 week. He admits of drinking adequate amount of water. He checked his blood pressure earlier today which was very low (SBP 70-80s) so he came to the ER. SBP  on ED arrival. Orthostatic vital signs were positive. Of note, he had creatinine of 3.0 with baseline creatinine around 1.5-2.0. He has been initiated on IV fluids. Initial troponin mildly elevated. EKG showed sinus tachycardia without new ST-T wave change. Patient denies chest pain, abdominal pain, SOB and LE edema on exam. Cardiology consulted. Patient placed in observation under hospital medicine.     Overview/Hospital Course:  Admission   Discharge cancel due to increase sCR . Continue IVF, replace K    Interval History: feeling much better today.     Review of Systems   Constitutional:  Positive for activity change and fatigue. Negative for chills and fever.   Eyes:  Negative for photophobia.   Respiratory:  Negative for cough, chest tightness, shortness of breath and wheezing.    Cardiovascular:  Negative for chest pain, palpitations and leg swelling.   Gastrointestinal:  Negative for abdominal pain, diarrhea, nausea and vomiting.   Genitourinary:  Negative for dysuria, flank pain and hematuria.   Musculoskeletal:  Negative for back pain, myalgias and neck pain.   Skin:  Negative for rash and wound.   Neurological:  Negative for dizziness,  syncope, weakness, light-headedness and headaches.   Psychiatric/Behavioral:  Negative for agitation.      Objective:     Vital Signs (Most Recent):  Temp: 98.7 °F (37.1 °C) (07/08/25 2142)  Pulse: 101 (07/08/25 2200)  Resp: 19 (07/08/25 2159)  BP: 131/66 (07/08/25 2200)  SpO2: 95 % (07/08/25 2200) Vital Signs (24h Range):  Temp:  [97.8 °F (36.6 °C)-98.7 °F (37.1 °C)] 98.7 °F (37.1 °C)  Pulse:  [] 101  Resp:  [18-19] 19  SpO2:  [89 %-97 %] 95 %  BP: ()/(46-79) 131/66     Weight: 94.8 kg (209 lb)  Body mass index is 28.35 kg/m².    Intake/Output Summary (Last 24 hours) at 7/8/2025 2207  Last data filed at 7/8/2025 1928  Gross per 24 hour   Intake 490.08 ml   Output 2150 ml   Net -1659.92 ml         Physical Exam  Constitutional:       General: He is not in acute distress.     Appearance: He is well-developed.   Eyes:      Conjunctiva/sclera: Conjunctivae normal.      Pupils: Pupils are equal, round, and reactive to light.   Neck:      Vascular: No JVD.   Cardiovascular:      Rate and Rhythm: Normal rate and regular rhythm.      Heart sounds: Normal heart sounds.   Pulmonary:      Effort: Pulmonary effort is normal. No respiratory distress.      Breath sounds: Normal breath sounds. No wheezing.   Abdominal:      General: Bowel sounds are normal. There is no distension.      Palpations: Abdomen is soft.      Tenderness: There is no abdominal tenderness. There is no guarding.   Musculoskeletal:         General: No tenderness. Normal range of motion.      Cervical back: Normal range of motion and neck supple.   Skin:     General: Skin is warm and dry.      Capillary Refill: Capillary refill takes less than 2 seconds.      Findings: No erythema.   Neurological:      Mental Status: He is alert and oriented to person, place, and time.      Comments: Lethargic    Psychiatric:         Behavior: Behavior normal.               Significant Labs: All pertinent labs within the past 24 hours have been  reviewed.    Significant Imaging: I have reviewed all pertinent imaging results/findings within the past 24 hours.      Assessment & Plan  MIKE (acute kidney injury)  MIKE is likely due to pre-renal azotemia due to dehydration. Baseline creatinine is 1.5-2.0. Most recent creatinine and eGFR are listed below.  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 Continue to monitor on telemetry.   Hypertension  -patient states he is no longer taking any of his home antihypertensives 2/2 low BP readings   - continue to hold   Elevated troponin  -likely 2/2 demand in setting of hypotension and MIKE  - patient denies chest pain on exam   - trend troponin  - check TTE  -consult cardiology 2/2 cardiac history - demand ischemia from hypotension and MIKE    VTE Risk Mitigation (From admission, onward)           Ordered     IP VTE HIGH RISK PATIENT  Once         07/07/25 1531     Place sequential compression device  Until discontinued         07/07/25 1531                    Discharge Planning   KAMILLA: 7/8/2025     Code Status: Full Code   Medical Readiness for Discharge Date: 7/8/2025  Discharge Plan A: Home   Discharge Delays: None known at this  time                    Philly Knox NP  Department of Hospital Medicine   [facility] [place] - Emergency Dept

## 2025-07-09 NOTE — ASSESSMENT & PLAN NOTE
MIKE is likely due to pre-renal azotemia due to dehydration. Baseline creatinine is 1.5-2.0. Most recent creatinine and eGFR are listed below.  Recent Labs     07/08/25  1224 07/08/25  1945 07/09/25  0602   CREATININE 2.1* 2.5* 2.0*   EGFRNORACEVR 33* 26* 35*      Plan  - MIKE is improving but back up  - Avoid nephrotoxins and renally dose meds for GFR listed above  - Monitor urine output, serial BMP, and adjust therapy as needed  - Continue IVF  - continue hold hctz

## 2025-07-09 NOTE — NURSING
Patient arrived to the unit via wheelchair accompanied by a transporter, on room air and no apparent distress noted. Oriented on his room and got situated on the bed. Put bed in lowest position, with HOB elevated. Side rails raised, instructed to call for assistance. Admit assessment initiated. Vital signs taken. Four eyes completed.     [facility] [place]  Nurses Note -- 4 Eyes      7/9/2025       Skin assessed on: Admit      [x] No Pressure Injuries Present    [x]Prevention Measures Documented    [] Yes LDA  for Pressure Injury Previously documented     [] Yes New Pressure Injury Discovered   [] LDA for New Pressure Injury Added      Attending RN:  Davidson Roldan RN     Second RN:  Luis Feliz RN

## 2025-07-09 NOTE — PLAN OF CARE
Inpatient Upgrade Note    Sherif Acjalil Jenkins has warranted treatment spanning two or more midnights of hospital level care for the management of MIKE. He continues to require IV fluids. His condition is also complicated by the following comorbidities: Coronary Artery Disease and Hypertension.

## 2025-07-09 NOTE — PLAN OF CARE
Problem: Adult Inpatient Plan of Care  Goal: Plan of Care Review  Outcome: Progressing     Problem: Acute Kidney Injury/Impairment  Goal: Fluid and Electrolyte Balance  Outcome: Progressing  Goal: Effective Renal Function  Outcome: Progressing     Problem: Fall Injury Risk  Goal: Absence of Fall and Fall-Related Injury  Outcome: Progressing     Problem: Fatigue  Goal: Improved Activity Tolerance  Outcome: Progressing     Problem: Comorbidity Management  Goal: Maintenance of Heart Failure Symptom Control  Outcome: Progressing

## 2025-07-09 NOTE — ASSESSMENT & PLAN NOTE
MIKE is likely due to pre-renal azotemia due to dehydration. Baseline creatinine is 1.5-2.0. Most recent creatinine and eGFR are listed below.  Recent Labs     07/08/25  0706 07/08/25  1224 07/08/25  1945   CREATININE 2.2* 2.1* 2.5*   EGFRNORACEVR 31* 33* 26*      Plan  - MIKE is improving but back up  - Avoid nephrotoxins and renally dose meds for GFR listed above  - Monitor urine output, serial BMP, and adjust therapy as needed  - Continue IVF  - continue hold hctz

## 2025-07-09 NOTE — DISCHARGE SUMMARY
[facility]  Discharge Summary      Patient Name: Sherif Knutson Jr.  MRN: [id number]  GREG: [id number]  Patient Class: IP- Inpatient  Admission Date: 7/7/2025  Hospital Length of Stay: 1 days  Discharge Date and Time: No discharge date for patient encounter.  Attending Physician: August Vail DO   Discharging Provider: Philly Green NP  Primary Care Provider: Bi Hollins DO    Primary Care Team: PHILLY GREEN    HPI:   Mr. Knutson is a 74-year-old male with past medical history of HTN, HLD, CAD s/p 5 vessel CABG, PCI x 3, systolic HF and DM who presented to [facility] ED 7/7/25 for evaluation of generalized weakness, fatigue and lightheadedness. Symptoms have been going on for 1 week. He admits of drinking adequate amount of water. He checked his blood pressure earlier today which was very low (SBP 70-80s) so he came to the ER. SBP  on ED arrival. Orthostatic vital signs were positive. Of note, he had creatinine of 3.0 with baseline creatinine around 1.5-2.0. He has been initiated on IV fluids. Initial troponin mildly elevated. EKG showed sinus tachycardia without new ST-T wave change. Patient denies chest pain, abdominal pain, SOB and LE edema on exam. Cardiology consulted. Patient placed in observation under hospital medicine.     * No surgery found *      Hospital Course:   Admission to the hospital for MIKE and hypokalemia felt due to volume depletion.  Renal function improved to baseline after IV fluid.  Hypokalemia resolved with replacement.  Phosphorus also low and replaced.  Patient tolerate diet no issues.  Continue to hold hydrochlorothiazide.  Also continue to hold mounjaro until seen by Endocrinology at [facility]. Refer to Nephrology for CKD. Follow up PCP and Endocrinology.       Goals of Care Treatment Preferences:  Code Status: Full Code         Consults:   Consults (From admission, onward)          Status Ordering Provider     Inpatient consult to Cardiology  Once         Provider:  Fatou Anton MD    Completed TIAN DUMONT            Assessment & Plan  MIKE (acute kidney injury)  MIKE is likely due to pre-renal azotemia due to dehydration. Baseline creatinine is 1.5-2.0. Most recent creatinine and eGFR are listed below.  Recent Labs     07/08/25  1224 07/08/25  1945 07/09/25  0602   CREATININE 2.1* 2.5* 2.0*   EGFRNORACEVR 33* 26* 35*      Plan  - MIKE is improving but back up  - Avoid nephrotoxins and renally dose meds for GFR listed above  - Monitor urine output, serial BMP, and adjust therapy as needed  - Continue IVF  - continue hold hctz    Orthostatic hypotension  -continue IVF     Dyslipidemia  -continue statin     Atherosclerosis of autologous vein coronary artery bypass graft with stable angina pectoris  See CAD  CHF (congestive heart failure)  - no evidence of decompensated HF on exam  - TTE pending     Coronary artery disease involving native coronary artery of native heart without angina pectoris  Patient with known CAD s/p stent placement and CABG, which is controlled Will continue ASA, Plavix, and Statin and monitor for S/Sx of angina/ACS. Continue to monitor on telemetry.   Hypertension  -patient states he is no longer taking any of his home antihypertensives 2/2 low BP readings   - continue to hold   Elevated troponin  -likely 2/2 demand in setting of hypotension and MIKE  - patient denies chest pain on exam   - trend troponin  - check TTE  -consult cardiology 2/2 cardiac history - demand ischemia from hypotension and MIKE    Final Active Diagnoses:    Diagnosis Date Noted POA    PRINCIPAL PROBLEM:  MIKE (acute kidney injury) [N17.9] 11/08/2023 Yes    Elevated troponin [R79.89] 07/08/2025 Yes    Hypertension [I10] 06/30/2020 Yes    Coronary artery disease involving native coronary artery of native heart without angina pectoris [I25.10] 05/06/2018 Yes    CHF (congestive heart failure) [I50.9] 08/29/2017 Yes    Orthostatic hypotension [I95.1] 07/14/2014  Yes    Atherosclerosis of autologous vein coronary artery bypass graft with stable angina pectoris [I25.718] 07/14/2014 Yes    Dyslipidemia [E78.5] 07/25/2013 Yes      Problems Resolved During this Admission:       Discharged Condition: stable    Disposition: Home or Self Care    Follow Up:   Follow-up Information       Bi Hollins DO. Go to.    Specialty: Family Medicine  Why: Hospital Followup on 7/14/25 @10:00am  Contact information:  [address] [facility] [address]  [phone]               Sandra Hanna APRN. Go on 7/31/2025.    Specialty: Family Medicine  Why: Appointment scheduled for 8:45am  Contact information:  [address] [place] [address]  [address]  [facility] [address]  [phone]                           Patient Instructions:      Ambulatory referral/consult to Nephrology   Standing Status: Future   Referral Priority: Routine Referral Type: Consultation   Referral Reason: Specialty Services Required   Requested Specialty: Nephrology   Number of Visits Requested: 1     Diet Cardiac     Diet diabetic     Notify your health care provider if you experience any of the following:  temperature >100.4     Notify your health care provider if you experience any of the following:  difficulty breathing or increased cough     Notify your health care provider if you experience any of the following:  increased confusion or weakness     Notify your health care provider if you experience any of the following:  temperature >100.4     Notify your health care provider if you experience any of the following:  difficulty breathing or increased cough     Notify your health care provider if you experience any of the following:  increased confusion or weakness     Activity as tolerated     Activity as tolerated       Significant Diagnostic Studies: N/A    Pending Diagnostic Studies:       None           Medications:  Reconciled Home Medications:      Medication List        CHANGE how you take these  "medications      rosuvastatin 40 MG Tab  Commonly known as: CRESTOR  Take 40 mg by mouth once daily.  What changed: Another medication with the same name was removed. Continue taking this medication, and follow the directions you see here.            CONTINUE taking these medications      aspirin 81 MG EC tablet  Commonly known as: ECOTRIN  Take 81 mg by mouth once daily.     azelastine 137 mcg (0.1 %) nasal spray  Commonly known as: ASTELIN  Two sprays each nostril twice daily, sniff full until absorbed, then follow with fluticasone nasal spray     BD ULTRA-FINE JERMAINE PEN NEEDLE 32 gauge x 5/32" Ndle  Generic drug: pen needle, diabetic     clopidogreL 75 mg tablet  Commonly known as: PLAVIX  Take 1 tablet (75 mg total) by mouth every evening.     COZAAR 50 mg tablet  Generic drug: losartan  Take 50 mg by mouth once daily.     DULoxetine 30 MG capsule  Commonly known as: CYMBALTA  Take 30 mg by mouth once daily.     EASY TOUCH 31 gauge x 5/16" Ndle  Generic drug: pen needle, diabetic     famotidine 20 MG tablet  Commonly known as: PEPCID  Take 20 mg by mouth 2 (two) times daily.     LANTUS SOLOSTAR U-100 INSULIN SUBQ  Inject 30 Units into the skin once daily.     metoprolol succinate 25 MG 24 hr tablet  Commonly known as: TOPROL-XL  Take 1 tablet (25 mg total) by mouth once daily.     MOUNJARO 7.5 mg/0.5 mL Pnij  Generic drug: tirzepatide  Inject 7.5 mg into the skin every 7 days.     nitroGLYCERIN 0.4 MG SL tablet  Commonly known as: NITROSTAT  Place 1 tab under the tongue for chest pain every 5 minutes x 3; if chest pain not relieved after 3 doses go to the ED     oxyCODONE-acetaminophen  mg per tablet  Commonly known as: PERCOCET  Take 1 tablet by mouth every 6 (six) hours as needed for Pain.     pregabalin 300 MG Cap  Commonly known as: LYRICA  2 (two) times a day.     SURE COMFORT INSULIN SYRINGE 0.5 mL 31 gauge x 5/16" Syrg  Generic drug: insulin syringe-needle U-100     VITAMIN B-12 ORAL  Take by " mouth.            STOP taking these medications      hydroCHLOROthiazide 25 MG tablet  Commonly known as: HYDRODIURIL              Indwelling Lines/Drains at time of discharge:   Lines/Drains/Airways       None                       Time spent on the discharge of patient: 35 minutes         Philly Knox NP  Department of Hospital Medicine  [facility]

## 2025-07-09 NOTE — PROGRESS NOTES
Heart Failure [facility] (H[facility]) Team notified of pt referral via [facility] (automated inbasket notification) .    Patient screened today7/9/25 by provider and RN for enrollment to program.      Pt was deemed not a candidate for enrollment at this time related to patient current admission diagnosis/ problem will not benefit from the Heart Failure Transitional Care Program.    Pt will require additional follow up planning per primary team.     If pt status, diagnosis, or treatment plan changes , please place AMB referral to [facility] ([id number]) for HFTCC enrollment re-evalution.

## 2025-07-09 NOTE — PLAN OF CARE
Problem: Adult Inpatient Plan of Care  Goal: Plan of Care Review  Outcome: Met  Goal: Patient-Specific Goal (Individualized)  Outcome: Met  Goal: Absence of Hospital-Acquired Illness or Injury  Outcome: Met  Goal: Optimal Comfort and Wellbeing  Outcome: Met  Goal: Readiness for Transition of Care  Outcome: Met     Problem: Diabetes Comorbidity  Goal: Blood Glucose Level Within Targeted Range  Outcome: Met     Problem: Acute Kidney Injury/Impairment  Goal: Fluid and Electrolyte Balance  Outcome: Met     Problem: Acute Kidney Injury/Impairment  Goal: Fluid and Electrolyte Balance  Outcome: Met  Goal: Improved Oral Intake  Outcome: Met  Goal: Effective Renal Function  Outcome: Met     Problem: Fall Injury Risk  Goal: Absence of Fall and Fall-Related Injury  Outcome: Met     Problem: Fatigue  Goal: Improved Activity Tolerance  Outcome: Met     Problem: Comorbidity Management  Goal: Maintenance of Heart Failure Symptom Control  Outcome: Met  Goal: Blood Pressure in Desired Range  Outcome: Met

## 2025-07-09 NOTE — SUBJECTIVE & OBJECTIVE
Interval History: feeling much better today.     Review of Systems   Constitutional:  Positive for activity change and fatigue. Negative for chills and fever.   Eyes:  Negative for photophobia.   Respiratory:  Negative for cough, chest tightness, shortness of breath and wheezing.    Cardiovascular:  Negative for chest pain, palpitations and leg swelling.   Gastrointestinal:  Negative for abdominal pain, diarrhea, nausea and vomiting.   Genitourinary:  Negative for dysuria, flank pain and hematuria.   Musculoskeletal:  Negative for back pain, myalgias and neck pain.   Skin:  Negative for rash and wound.   Neurological:  Negative for dizziness, syncope, weakness, light-headedness and headaches.   Psychiatric/Behavioral:  Negative for agitation.      Objective:     Vital Signs (Most Recent):  Temp: 98.7 °F (37.1 °C) (07/08/25 2142)  Pulse: 101 (07/08/25 2200)  Resp: 19 (07/08/25 2159)  BP: 131/66 (07/08/25 2200)  SpO2: 95 % (07/08/25 2200) Vital Signs (24h Range):  Temp:  [97.8 °F (36.6 °C)-98.7 °F (37.1 °C)] 98.7 °F (37.1 °C)  Pulse:  [] 101  Resp:  [18-19] 19  SpO2:  [89 %-97 %] 95 %  BP: ()/(46-79) 131/66     Weight: 94.8 kg (209 lb)  Body mass index is 28.35 kg/m².    Intake/Output Summary (Last 24 hours) at 7/8/2025 2207  Last data filed at 7/8/2025 1928  Gross per 24 hour   Intake 490.08 ml   Output 2150 ml   Net -1659.92 ml         Physical Exam  Constitutional:       General: He is not in acute distress.     Appearance: He is well-developed.   Eyes:      Conjunctiva/sclera: Conjunctivae normal.      Pupils: Pupils are equal, round, and reactive to light.   Neck:      Vascular: No JVD.   Cardiovascular:      Rate and Rhythm: Normal rate and regular rhythm.      Heart sounds: Normal heart sounds.   Pulmonary:      Effort: Pulmonary effort is normal. No respiratory distress.      Breath sounds: Normal breath sounds. No wheezing.   Abdominal:      General: Bowel sounds are normal. There is no  distension.      Palpations: Abdomen is soft.      Tenderness: There is no abdominal tenderness. There is no guarding.   Musculoskeletal:         General: No tenderness. Normal range of motion.      Cervical back: Normal range of motion and neck supple.   Skin:     General: Skin is warm and dry.      Capillary Refill: Capillary refill takes less than 2 seconds.      Findings: No erythema.   Neurological:      Mental Status: He is alert and oriented to person, place, and time.      Comments: Lethargic    Psychiatric:         Behavior: Behavior normal.               Significant Labs: All pertinent labs within the past 24 hours have been reviewed.    Significant Imaging: I have reviewed all pertinent imaging results/findings within the past 24 hours.

## 2025-07-09 NOTE — NURSING
Report received from raj Smith RN. Patient resting quietly, no apparent distress noted at this time. Wheels locked in lowest position, call light within reach, Telemetry monitoring in place.    [facility]  Nurses Note -- 4 Eyes      7/9/2025       Skin assessed on: Q Shift      [x] No Pressure Injuries Present    [x]Prevention Measures Documented    [] Yes LDA  for Pressure Injury Previously documented     [] Yes New Pressure Injury Discovered   [] LDA for New Pressure Injury Added      Attending RN:  Carola Love LPN     Second RN:  MJ Smith

## 2025-07-09 NOTE — NURSING
Patient escorted by hospital to family vehicle for discharge home. Patient accompanied by son. No apparent distress noted.

## (undated) DEVICE — GLOVE BIOGEL ECLIPSE SZ 6.5

## (undated) DEVICE — GOWN SURGICAL X-LARGE

## (undated) DEVICE — GLOVE BIOGEL ECLIPSE SZ 7.5

## (undated) DEVICE — TRAY CYSTO BASIN

## (undated) DEVICE — FIBER LASER HOLMIUM 273 MICRON

## (undated) DEVICE — SOL IRR WATER STRL 3000 ML

## (undated) DEVICE — VALVE ENDOSCOPIC(SURESEAL II)

## (undated) DEVICE — GUIDEWIRE PTFE .038INX145CM

## (undated) DEVICE — PACK CYSTO

## (undated) DEVICE — SOL IRR NACL .9% 3000ML

## (undated) DEVICE — EXTRACTOR TIPLESS 2.4FRX1115CM

## (undated) DEVICE — SYR 10CC LUER LOCK